# Patient Record
Sex: MALE | Race: BLACK OR AFRICAN AMERICAN | NOT HISPANIC OR LATINO | Employment: OTHER | URBAN - METROPOLITAN AREA
[De-identification: names, ages, dates, MRNs, and addresses within clinical notes are randomized per-mention and may not be internally consistent; named-entity substitution may affect disease eponyms.]

---

## 2022-05-31 ENCOUNTER — LAB VISIT (OUTPATIENT)
Dept: LAB | Facility: HOSPITAL | Age: 57
End: 2022-05-31
Attending: STUDENT IN AN ORGANIZED HEALTH CARE EDUCATION/TRAINING PROGRAM
Payer: MEDICARE

## 2022-05-31 ENCOUNTER — OFFICE VISIT (OUTPATIENT)
Dept: FAMILY MEDICINE | Facility: CLINIC | Age: 57
End: 2022-05-31
Payer: MEDICARE

## 2022-05-31 VITALS
TEMPERATURE: 98 F | BODY MASS INDEX: 40 KG/M2 | SYSTOLIC BLOOD PRESSURE: 134 MMHG | DIASTOLIC BLOOD PRESSURE: 72 MMHG | HEIGHT: 67 IN | WEIGHT: 254.88 LBS | HEART RATE: 84 BPM | OXYGEN SATURATION: 98 %

## 2022-05-31 DIAGNOSIS — Z11.59 ENCOUNTER FOR HEPATITIS C SCREENING TEST FOR LOW RISK PATIENT: ICD-10-CM

## 2022-05-31 DIAGNOSIS — E66.01 CLASS 2 SEVERE OBESITY DUE TO EXCESS CALORIES WITH SERIOUS COMORBIDITY AND BODY MASS INDEX (BMI) OF 39.0 TO 39.9 IN ADULT: ICD-10-CM

## 2022-05-31 DIAGNOSIS — E78.2 HYPERLIPIDEMIA, MIXED: Primary | ICD-10-CM

## 2022-05-31 DIAGNOSIS — I10 ESSENTIAL HYPERTENSION: ICD-10-CM

## 2022-05-31 DIAGNOSIS — E78.2 HYPERLIPIDEMIA, MIXED: ICD-10-CM

## 2022-05-31 DIAGNOSIS — Z11.4 ENCOUNTER FOR SCREENING FOR HUMAN IMMUNODEFICIENCY VIRUS (HIV): ICD-10-CM

## 2022-05-31 LAB
CHOLEST SERPL-MCNC: 169 MG/DL (ref 120–199)
CHOLEST/HDLC SERPL: 3.8 {RATIO} (ref 2–5)
ESTIMATED AVG GLUCOSE: 126 MG/DL (ref 68–131)
HBA1C MFR BLD: 6 % (ref 4–5.6)
HDLC SERPL-MCNC: 44 MG/DL (ref 40–75)
HDLC SERPL: 26 % (ref 20–50)
LDLC SERPL CALC-MCNC: 96 MG/DL (ref 63–159)
NONHDLC SERPL-MCNC: 125 MG/DL
TRIGL SERPL-MCNC: 145 MG/DL (ref 30–150)

## 2022-05-31 PROCEDURE — 99203 OFFICE O/P NEW LOW 30 MIN: CPT | Mod: PBBFAC,PO | Performed by: STUDENT IN AN ORGANIZED HEALTH CARE EDUCATION/TRAINING PROGRAM

## 2022-05-31 PROCEDURE — 80061 LIPID PANEL: CPT | Performed by: STUDENT IN AN ORGANIZED HEALTH CARE EDUCATION/TRAINING PROGRAM

## 2022-05-31 PROCEDURE — 87389 HIV-1 AG W/HIV-1&-2 AB AG IA: CPT | Performed by: STUDENT IN AN ORGANIZED HEALTH CARE EDUCATION/TRAINING PROGRAM

## 2022-05-31 PROCEDURE — 36415 COLL VENOUS BLD VENIPUNCTURE: CPT | Mod: PO | Performed by: STUDENT IN AN ORGANIZED HEALTH CARE EDUCATION/TRAINING PROGRAM

## 2022-05-31 PROCEDURE — 99203 PR OFFICE/OUTPT VISIT, NEW, LEVL III, 30-44 MIN: ICD-10-PCS | Mod: S$PBB,,, | Performed by: STUDENT IN AN ORGANIZED HEALTH CARE EDUCATION/TRAINING PROGRAM

## 2022-05-31 PROCEDURE — 99999 PR PBB SHADOW E&M-NEW PATIENT-LVL III: ICD-10-PCS | Mod: PBBFAC,,, | Performed by: STUDENT IN AN ORGANIZED HEALTH CARE EDUCATION/TRAINING PROGRAM

## 2022-05-31 PROCEDURE — 86803 HEPATITIS C AB TEST: CPT | Performed by: STUDENT IN AN ORGANIZED HEALTH CARE EDUCATION/TRAINING PROGRAM

## 2022-05-31 PROCEDURE — 99999 PR PBB SHADOW E&M-NEW PATIENT-LVL III: CPT | Mod: PBBFAC,,, | Performed by: STUDENT IN AN ORGANIZED HEALTH CARE EDUCATION/TRAINING PROGRAM

## 2022-05-31 PROCEDURE — 83036 HEMOGLOBIN GLYCOSYLATED A1C: CPT | Performed by: STUDENT IN AN ORGANIZED HEALTH CARE EDUCATION/TRAINING PROGRAM

## 2022-05-31 PROCEDURE — 99203 OFFICE O/P NEW LOW 30 MIN: CPT | Mod: S$PBB,,, | Performed by: STUDENT IN AN ORGANIZED HEALTH CARE EDUCATION/TRAINING PROGRAM

## 2022-05-31 RX ORDER — HYDROCHLOROTHIAZIDE 25 MG/1
25 TABLET ORAL DAILY
COMMUNITY
Start: 2022-05-27 | End: 2022-05-31 | Stop reason: SDUPTHER

## 2022-05-31 RX ORDER — LOSARTAN POTASSIUM 50 MG/1
50 TABLET ORAL DAILY
Qty: 90 TABLET | Refills: 1 | Status: SHIPPED | OUTPATIENT
Start: 2022-05-31 | End: 2022-11-30 | Stop reason: SDUPTHER

## 2022-05-31 RX ORDER — OLANZAPINE 10 MG/1
10 TABLET ORAL
COMMUNITY
End: 2022-05-31

## 2022-05-31 RX ORDER — OLANZAPINE 5 MG/1
5 TABLET ORAL
COMMUNITY
End: 2022-05-31 | Stop reason: DRUGHIGH

## 2022-05-31 RX ORDER — OMEPRAZOLE 20 MG/1
20 CAPSULE, DELAYED RELEASE ORAL
COMMUNITY
End: 2023-06-15

## 2022-05-31 RX ORDER — LEUPROLIDE ACETATE 45 MG
KIT INTRAMUSCULAR
Status: ON HOLD | COMMUNITY
Start: 2022-05-04 | End: 2024-04-02 | Stop reason: HOSPADM

## 2022-05-31 RX ORDER — DOCUSATE CALCIUM 240 MG
240 CAPSULE ORAL
COMMUNITY
End: 2022-05-31

## 2022-05-31 RX ORDER — ROSUVASTATIN CALCIUM 10 MG/1
10 TABLET, COATED ORAL
Status: CANCELLED | OUTPATIENT
Start: 2022-05-31

## 2022-05-31 RX ORDER — ROSUVASTATIN CALCIUM 10 MG/1
10 TABLET, COATED ORAL
COMMUNITY
End: 2022-05-31 | Stop reason: SDUPTHER

## 2022-05-31 RX ORDER — ROSUVASTATIN CALCIUM 10 MG/1
10 TABLET, COATED ORAL NIGHTLY
Qty: 90 TABLET | Refills: 3 | Status: SHIPPED | OUTPATIENT
Start: 2022-05-31 | End: 2022-11-30 | Stop reason: SDUPTHER

## 2022-05-31 RX ORDER — OLANZAPINE 15 MG/1
15 TABLET ORAL NIGHTLY
COMMUNITY
Start: 2022-05-27 | End: 2023-06-15

## 2022-05-31 RX ORDER — ALBUTEROL SULFATE 90 UG/1
AEROSOL, METERED RESPIRATORY (INHALATION)
COMMUNITY
End: 2023-06-22 | Stop reason: SDUPTHER

## 2022-05-31 RX ORDER — LOSARTAN POTASSIUM 50 MG/1
50 TABLET ORAL DAILY
COMMUNITY
Start: 2022-05-27 | End: 2022-05-31 | Stop reason: SDUPTHER

## 2022-05-31 RX ORDER — HYDROCHLOROTHIAZIDE 25 MG/1
25 TABLET ORAL
COMMUNITY
End: 2022-05-31 | Stop reason: SDUPTHER

## 2022-05-31 RX ORDER — NAPROXEN 500 MG/1
500 TABLET ORAL
COMMUNITY
End: 2022-05-31

## 2022-05-31 RX ORDER — CETIRIZINE HYDROCHLORIDE 10 MG/1
10 TABLET ORAL
COMMUNITY
End: 2022-05-31

## 2022-05-31 RX ORDER — HYDROCHLOROTHIAZIDE 25 MG/1
25 TABLET ORAL DAILY
Qty: 90 TABLET | Refills: 1 | Status: SHIPPED | OUTPATIENT
Start: 2022-05-31 | End: 2022-11-30 | Stop reason: SDUPTHER

## 2022-05-31 RX ORDER — LOSARTAN POTASSIUM 50 MG/1
50 TABLET ORAL
COMMUNITY
End: 2022-05-31 | Stop reason: SDUPTHER

## 2022-05-31 RX ORDER — LOSARTAN POTASSIUM 50 MG/1
50 TABLET ORAL
Status: CANCELLED | OUTPATIENT
Start: 2022-05-31

## 2022-05-31 RX ORDER — BENAZEPRIL/HYDROCHLOROTHIAZIDE 20 MG-25MG
1 TABLET ORAL DAILY
Status: CANCELLED | OUTPATIENT
Start: 2022-05-31

## 2022-05-31 RX ORDER — BENAZEPRIL/HYDROCHLOROTHIAZIDE 20 MG-25MG
1 TABLET ORAL DAILY
COMMUNITY
End: 2022-05-31 | Stop reason: SDUPTHER

## 2022-05-31 NOTE — PROGRESS NOTES
06/04/2022    Randolph Blue  [id number]    CC: establish care    HPI    This patient is new to me and presents to establish care. Chronic medical conditions listed below.    Patient was recently incarcerated for 7 years, but has been out for the last 3 weeks. Medical conditions for prostate cancer now s/p radiation. Johan is on patient medication q 6 months, but states that he is not on that. Pt also with hypertension that has been well controlled with losartan 50 mg daily. Recently, started on olanzapine for a mood disorder which is managed by psych. He does not have any current concerns or complaints and just wants to establish care.      POSITIVE BOLDED  General: fever, chills, fatigue, weight loss  Eyes: vision changes, blurry vision, eye pain  ENT: hearing loss, ringing, dental problems, sinus congestion, sore throat  Resp: SOB, cough, wheeze  CV: chest pain, palpitations, LE swelling  GI: diarrhea/constipation, black/bloody stools, indigestion  : frequency, urgency, dysuria, polydipsia, hematuria  Skin: rashes, wounds, lacerations  Psych: SI, HI, depressed  Endocrine: heat intolerance, cold intolerance    Past Medical History:   Diagnosis Date    Arthritis     Bronchitis     Cervical arthritis     SILVIA (generalized anxiety disorder) 1/3/2014    GERD (gastroesophageal reflux disease)     Hyperlipidemia, mixed 4/3/2014       Past Surgical History:   Procedure Laterality Date    KNEE SURGERY      bilateral    MANDIBLE FRACTURE SURGERY      SHOULDER SURGERY      [place] [facility].        Family History   Problem Relation Age of Onset    Cancer Mother     Hypertension Mother     Heart disease Father     Hypertension Maternal Uncle     Hypertension Paternal Uncle     Hypertension Maternal Grandmother     Heart disease Paternal Grandmother     Hypertension Paternal Grandmother        Social History     Socioeconomic History    Marital status: Single   Tobacco Use    Smoking status: Never  Smoker    Smokeless tobacco: Never Used   Substance and Sexual Activity    Alcohol use: Yes     Alcohol/week: 3.3 - 4.2 standard drinks     Types: 4 - 5 Standard drinks or equivalent per week     Comment: No alcohol x 2 weeks    Drug use: No         Current Outpatient Medications:     albuterol (PROVENTIL/VENTOLIN HFA) 90 mcg/actuation inhaler, Inhale into the lungs., Disp: , Rfl:     hydroCHLOROthiazide (HYDRODIURIL) 25 MG tablet, Take 1 tablet (25 mg total) by mouth once daily., Disp: 90 tablet, Rfl: 1    leuprolide, 6 month, (ELIGARD) 45 mg injection, Inject 45 mg into the skin., Disp: , Rfl:     losartan (COZAAR) 50 MG tablet, Take 1 tablet (50 mg total) by mouth once daily., Disp: 90 tablet, Rfl: 1    LUPRON DEPOT, 6 MONTH, 45 mg SyKt injection, Inject into the muscle., Disp: , Rfl:     multivitamin capsule, Take 1 capsule by mouth once daily., Disp: , Rfl:     OLANZapine (ZYPREXA) 15 MG Tab, Take 15 mg by mouth nightly., Disp: , Rfl:     omeprazole (PRILOSEC) 20 MG capsule, Take 20 mg by mouth., Disp: , Rfl:     rosuvastatin (CRESTOR) 10 MG tablet, Take 1 tablet (10 mg total) by mouth every evening., Disp: 90 tablet, Rfl: 3        Vitals:    05/31/22 1527   BP: 134/72   Pulse: 84   Temp: 98.4 °F (36.9 °C)       PHYSICAL EXAM    GEN: NAD, AAox3, well nourished  HEENT: NCAT, EOMI, PEERL, no scleral injection, TM normal, moist mucous membranes, oropharynx clear, no erythema, no exudates  NECK: full rom, no cervical lymphadenopathy, no thyroidmegally  CV: RRR, no m/r/g, trace LE edema  LUNGS: CTAB, non-labored breathing, no wheezes, no crackles  ABD: soft, non-distended, no rebound/guarding, no organomegaly  EXT: n c/c/e, warm, 5/5 UE and LE strength  NEURO: CNII-XII intact no focal deficit  PSYCH: nl affect, no hallucinations, nl speech  Skin: inatct, no rashes/lesions/erythema    1. Hyperlipidemia, mixed  - rosuvastatin (CRESTOR) 10 MG tablet; Take 1 tablet (10 mg total) by mouth every evening.   Dispense: 90 tablet; Refill: 3  - Lipid Panel; Future    2. Essential hypertension  Well controlled  Continue current regimen  - rosuvastatin (CRESTOR) 10 MG tablet; Take 1 tablet (10 mg total) by mouth every evening.  Dispense: 90 tablet; Refill: 3  - losartan (COZAAR) 50 MG tablet; Take 1 tablet (50 mg total) by mouth once daily.  Dispense: 90 tablet; Refill: 1  - hydroCHLOROthiazide (HYDRODIURIL) 25 MG tablet; Take 1 tablet (25 mg total) by mouth once daily.  Dispense: 90 tablet; Refill: 1    3. Encounter for hepatitis C screening test for low risk patient  - Hepatitis C Antibody; Future    4. Encounter for screening for human immunodeficiency virus (HIV)  - HIV 1/2 Ag/Ab (4th Gen); Future    5. Class 2 severe obesity due to excess calories with serious comorbidity and body mass index (BMI) of 39.0 to 39.9 in adult  - Hemoglobin A1C; Future  Discussed changing lifestyle including getting at least 30 min. of moderate intensity exercise 3-4 week. Limit processed/packaged foods. Increasing water intake and having a normal sleep schedule of at least 7 hours per night.      RTC if worsening or no improvement in 1-2 weeks    Taina Mullins MD  Family Medicine

## 2022-06-01 ENCOUNTER — TELEPHONE (OUTPATIENT)
Dept: FAMILY MEDICINE | Facility: CLINIC | Age: 57
End: 2022-06-01
Payer: MEDICARE

## 2022-06-01 LAB
HCV AB SERPL QL IA: NEGATIVE
HIV 1+2 AB+HIV1 P24 AG SERPL QL IA: NEGATIVE

## 2022-06-01 NOTE — PROGRESS NOTES
Please let patient know that his diabetes screening shows that he has pre-diabetes which means he needs to change his diet and eat less starches including bread, rice and pasta. Also sugary drinks. We will recheck in 6 months

## 2022-06-01 NOTE — TELEPHONE ENCOUNTER
----- Message from Taina Mullins MD sent at 6/1/2022  7:15 AM CDT -----  Please let patient know that his diabetes screening shows that he has pre-diabetes which means he needs to change his diet and eat less starches including bread, rice and pasta. Also sugary drinks. We will recheck in 6 months

## 2022-06-08 DIAGNOSIS — I10 ESSENTIAL HYPERTENSION: ICD-10-CM

## 2022-11-30 ENCOUNTER — OFFICE VISIT (OUTPATIENT)
Dept: FAMILY MEDICINE | Facility: CLINIC | Age: 57
End: 2022-11-30
Payer: MEDICARE

## 2022-11-30 ENCOUNTER — LAB VISIT (OUTPATIENT)
Dept: LAB | Facility: HOSPITAL | Age: 57
End: 2022-11-30
Attending: STUDENT IN AN ORGANIZED HEALTH CARE EDUCATION/TRAINING PROGRAM
Payer: MEDICARE

## 2022-11-30 VITALS
OXYGEN SATURATION: 97 % | HEIGHT: 67 IN | HEART RATE: 77 BPM | WEIGHT: 255.75 LBS | TEMPERATURE: 98 F | BODY MASS INDEX: 40.14 KG/M2

## 2022-11-30 DIAGNOSIS — E66.01 CLASS 3 SEVERE OBESITY DUE TO EXCESS CALORIES WITH SERIOUS COMORBIDITY AND BODY MASS INDEX (BMI) OF 40.0 TO 44.9 IN ADULT: ICD-10-CM

## 2022-11-30 DIAGNOSIS — R73.03 PREDIABETES: Primary | ICD-10-CM

## 2022-11-30 DIAGNOSIS — E78.2 HYPERLIPIDEMIA, MIXED: ICD-10-CM

## 2022-11-30 DIAGNOSIS — R73.03 PREDIABETES: ICD-10-CM

## 2022-11-30 DIAGNOSIS — I10 ESSENTIAL HYPERTENSION: ICD-10-CM

## 2022-11-30 LAB
ALBUMIN SERPL BCP-MCNC: 3.7 G/DL (ref 3.5–5.2)
ALP SERPL-CCNC: 90 U/L (ref 55–135)
ALT SERPL W/O P-5'-P-CCNC: 17 U/L (ref 10–44)
ANION GAP SERPL CALC-SCNC: 10 MMOL/L (ref 8–16)
AST SERPL-CCNC: 19 U/L (ref 10–40)
BILIRUB SERPL-MCNC: 0.7 MG/DL (ref 0.1–1)
BUN SERPL-MCNC: 18 MG/DL (ref 6–20)
CALCIUM SERPL-MCNC: 10 MG/DL (ref 8.7–10.5)
CHLORIDE SERPL-SCNC: 101 MMOL/L (ref 95–110)
CO2 SERPL-SCNC: 29 MMOL/L (ref 23–29)
CREAT SERPL-MCNC: 0.9 MG/DL (ref 0.5–1.4)
EST. GFR  (NO RACE VARIABLE): >60 ML/MIN/1.73 M^2
ESTIMATED AVG GLUCOSE: 123 MG/DL (ref 68–131)
GLUCOSE SERPL-MCNC: 102 MG/DL (ref 70–110)
HBA1C MFR BLD: 5.9 % (ref 4–5.6)
POTASSIUM SERPL-SCNC: 3.8 MMOL/L (ref 3.5–5.1)
PROT SERPL-MCNC: 8.4 G/DL (ref 6–8.4)
SODIUM SERPL-SCNC: 140 MMOL/L (ref 136–145)

## 2022-11-30 PROCEDURE — 36415 COLL VENOUS BLD VENIPUNCTURE: CPT | Mod: PO | Performed by: STUDENT IN AN ORGANIZED HEALTH CARE EDUCATION/TRAINING PROGRAM

## 2022-11-30 PROCEDURE — 83036 HEMOGLOBIN GLYCOSYLATED A1C: CPT | Performed by: STUDENT IN AN ORGANIZED HEALTH CARE EDUCATION/TRAINING PROGRAM

## 2022-11-30 PROCEDURE — 99999 PR PBB SHADOW E&M-EST. PATIENT-LVL III: CPT | Mod: PBBFAC,,, | Performed by: STUDENT IN AN ORGANIZED HEALTH CARE EDUCATION/TRAINING PROGRAM

## 2022-11-30 PROCEDURE — 99214 PR OFFICE/OUTPT VISIT, EST, LEVL IV, 30-39 MIN: ICD-10-PCS | Mod: S$PBB,,, | Performed by: STUDENT IN AN ORGANIZED HEALTH CARE EDUCATION/TRAINING PROGRAM

## 2022-11-30 PROCEDURE — 80053 COMPREHEN METABOLIC PANEL: CPT | Performed by: STUDENT IN AN ORGANIZED HEALTH CARE EDUCATION/TRAINING PROGRAM

## 2022-11-30 PROCEDURE — 99213 OFFICE O/P EST LOW 20 MIN: CPT | Mod: PBBFAC,PO | Performed by: STUDENT IN AN ORGANIZED HEALTH CARE EDUCATION/TRAINING PROGRAM

## 2022-11-30 PROCEDURE — 99214 OFFICE O/P EST MOD 30 MIN: CPT | Mod: S$PBB,,, | Performed by: STUDENT IN AN ORGANIZED HEALTH CARE EDUCATION/TRAINING PROGRAM

## 2022-11-30 PROCEDURE — 99999 PR PBB SHADOW E&M-EST. PATIENT-LVL III: ICD-10-PCS | Mod: PBBFAC,,, | Performed by: STUDENT IN AN ORGANIZED HEALTH CARE EDUCATION/TRAINING PROGRAM

## 2022-11-30 RX ORDER — ROSUVASTATIN CALCIUM 10 MG/1
10 TABLET, COATED ORAL NIGHTLY
Qty: 90 TABLET | Refills: 3 | Status: SHIPPED | OUTPATIENT
Start: 2022-11-30 | End: 2023-12-04 | Stop reason: SDUPTHER

## 2022-11-30 RX ORDER — LOSARTAN POTASSIUM 50 MG/1
50 TABLET ORAL DAILY
Qty: 90 TABLET | Refills: 3 | Status: SHIPPED | OUTPATIENT
Start: 2022-11-30 | End: 2023-12-04 | Stop reason: SDUPTHER

## 2022-11-30 RX ORDER — FLUOXETINE HYDROCHLORIDE 40 MG/1
40 CAPSULE ORAL DAILY
COMMUNITY
Start: 2022-10-13 | End: 2023-12-04 | Stop reason: SDUPTHER

## 2022-11-30 RX ORDER — HYDROCHLOROTHIAZIDE 25 MG/1
25 TABLET ORAL DAILY
Qty: 90 TABLET | Refills: 3 | Status: SHIPPED | OUTPATIENT
Start: 2022-11-30 | End: 2023-12-04 | Stop reason: SDUPTHER

## 2022-11-30 RX ORDER — AMITRIPTYLINE HYDROCHLORIDE 100 MG/1
TABLET ORAL
COMMUNITY
Start: 2022-10-13 | End: 2022-11-30

## 2022-11-30 NOTE — PROGRESS NOTES
12/01/2022    Randolph Blue  [id number]    CC: 6 month follow up    HPI    Patient presents for routine follow up.    HTN  Monitors BP and remains normotensive  Taking meds w/o issue    Mood disorder: zyprexa  and fluoxetine   Managed with psychiatry; next visit in Feb 2023    Prediabetes  Last hgba1c 6.0  Does not monitor diet. Planned on getting snowball after visit today    Hx of prostate cancer  On lupron inj every 6 months. Having lots of hot flashes     Negative 10 point ROS outside of HPI    Social History     Socioeconomic History    Marital status: Single   Tobacco Use    Smoking status: Never    Smokeless tobacco: Never   Substance and Sexual Activity    Alcohol use: Not Currently     Alcohol/week: 3.3 - 4.2 standard drinks     Types: 4 - 5 Standard drinks or equivalent per week     Comment: No alcohol x 2 weeks    Drug use: No    Sexual activity: Not Currently           Current Outpatient Medications:     albuterol (PROVENTIL/VENTOLIN HFA) 90 mcg/actuation inhaler, Inhale into the lungs., Disp: , Rfl:     FLUoxetine 40 MG capsule, Take 40 mg by mouth once daily., Disp: , Rfl:     leuprolide acetate, 6 month, (ELIGARD) 45 mg injection, Inject 45 mg into the skin every 6 (six) months., Disp: , Rfl:     leuprolide, 6 month, (ELIGARD) 45 mg injection, Inject 45 mg into the skin., Disp: , Rfl:     LUPRON DEPOT, 6 MONTH, 45 mg SyKt injection, Inject into the muscle., Disp: , Rfl:     multivitamin capsule, Take 1 capsule by mouth once daily., Disp: , Rfl:     OLANZapine (ZYPREXA) 15 MG Tab, Take 15 mg by mouth nightly., Disp: , Rfl:     hydroCHLOROthiazide (HYDRODIURIL) 25 MG tablet, Take 1 tablet (25 mg total) by mouth once daily., Disp: 90 tablet, Rfl: 3    losartan (COZAAR) 50 MG tablet, Take 1 tablet (50 mg total) by mouth once daily., Disp: 90 tablet, Rfl: 3    omeprazole (PRILOSEC) 20 MG capsule, Take 20 mg by mouth., Disp: , Rfl:     rosuvastatin (CRESTOR) 10 MG tablet, Take 1 tablet (10 mg total) by mouth  every evening., Disp: 90 tablet, Rfl: 3      Physical Exam  Vitals:    11/30/22 1411   Pulse: 77   Temp: 97.5 °F (36.4 °C)       GEN: NAD, AAox3, well nourished  HEENT: NCAT, EOMI, PEERL, no scleral injection, TM normal, moist mucous membranes, oropharynx clear, no erythema, no exudates  NECK: full rom, no cervical lymphadenopathy, no thyroidmegally  CV: RRR, no m/r/g, trace LE edema  LUNGS: CTAB, non-labored breathing, no wheezes, no crackles  ABD: soft, non-distended, no rebound/guarding, no organomegaly  EXT: n c/c/e, warm, 5/5 UE and LE strength  NEURO: CNII-XII intact no focal deficit  PSYCH: nl affect, no hallucinations, nl speech  Skin: intact, no rashes/lesions/erythema    1. Essential hypertension: well controlled  REFILLED   - hydroCHLOROthiazide (HYDRODIURIL) 25 MG tablet; Take 1 tablet (25 mg total) by mouth once daily.  Dispense: 90 tablet; Refill: 3  - losartan (COZAAR) 50 MG tablet; Take 1 tablet (50 mg total) by mouth once daily.  Dispense: 90 tablet; Refill: 3  - rosuvastatin (CRESTOR) 10 MG tablet; Take 1 tablet (10 mg total) by mouth every evening.  Dispense: 90 tablet; Refill: 3    2. Hyperlipidemia, mixed  - rosuvastatin (CRESTOR) 10 MG tablet; Take 1 tablet (10 mg total) by mouth every evening.  Dispense: 90 tablet; Refill: 3    3. Prediabetes  - Hemoglobin A1C; Future  -discussed lifestyle modifications    4. Class 3 severe obesity due to excess calories with serious comorbidity and body mass index (BMI) of 40.0 to 44.9 in adult  Discussed changing lifestyle including getting at least 30 min. of moderate intensity exercise 3-4 week. Limit processed/packaged foods. Increasing water intake and having a normal sleep schedule of at least 7 hours per night.    RTC in 6 months for routine care    Taina Mullins MD  Family Medicine

## 2022-12-01 ENCOUNTER — TELEPHONE (OUTPATIENT)
Dept: FAMILY MEDICINE | Facility: CLINIC | Age: 57
End: 2022-12-01
Payer: MEDICARE

## 2022-12-01 NOTE — TELEPHONE ENCOUNTER
----- Message from Taina Mullins MD sent at 12/1/2022 12:33 PM CST -----  Please let pt know that his labs were ok. His blood sugar is slightly above average, so he still has prediabetes but it didn't get worse.

## 2022-12-01 NOTE — PROGRESS NOTES
Please let pt know that his labs were ok. His blood sugar is slightly above average, so he still has prediabetes but it didn't get worse.

## 2023-01-04 ENCOUNTER — HOSPITAL ENCOUNTER (EMERGENCY)
Facility: HOSPITAL | Age: 58
Discharge: HOME | End: 2023-01-04
Attending: EMERGENCY MEDICINE
Payer: MEDICARE

## 2023-01-04 VITALS
TEMPERATURE: 99 F | OXYGEN SATURATION: 99 % | DIASTOLIC BLOOD PRESSURE: 70 MMHG | RESPIRATION RATE: 18 BRPM | SYSTOLIC BLOOD PRESSURE: 116 MMHG | HEIGHT: 67 IN | WEIGHT: 255 LBS | HEART RATE: 80 BPM | BODY MASS INDEX: 40.02 KG/M2

## 2023-01-04 DIAGNOSIS — U07.1 COVID-19 VIRUS DETECTED: ICD-10-CM

## 2023-01-04 DIAGNOSIS — U07.1 COVID-19: Primary | ICD-10-CM

## 2023-01-04 LAB — SARS-COV-2 RDRP RESP QL NAA+PROBE: POSITIVE

## 2023-01-04 PROCEDURE — 99283 EMERGENCY DEPT VISIT LOW MDM: CPT | Mod: 25

## 2023-01-04 PROCEDURE — U0002 COVID-19 LAB TEST NON-CDC: HCPCS | Performed by: EMERGENCY MEDICINE

## 2023-01-04 PROCEDURE — 99284 PR EMERGENCY DEPT VISIT,LEVEL IV: ICD-10-PCS | Mod: CR,,, | Performed by: EMERGENCY MEDICINE

## 2023-01-04 PROCEDURE — 99284 EMERGENCY DEPT VISIT MOD MDM: CPT | Mod: CR,,, | Performed by: EMERGENCY MEDICINE

## 2023-01-04 NOTE — ED PROVIDER NOTES
Encounter Date: 1/4/2023    SCRIBE #1 NOTE: I, Zoila James, am scribing for, and in the presence of,  Celi Sandoval MD. I have scribed the entire note.     History     Chief Complaint   Patient presents with    Nasal Congestion     Body aches and headache x2 months, states needs a refill of his asthma inhaler, ran out a few months ago     Randolph Blue is a 57 y.o. male with a past medical history of GERD, HLD, and arthritis who presents with a complaint of nasal congestion onset over a month ago. The patient endorses chills, cough, rhinorrhea, headache, and body aches. This is the first time he was seen for his symptoms. He reports that his symptoms started after his prostate surgery.The patient took Dayquil with no relief. He denies any association with a sick contact. Patient denies fever and leg swelling.       The history is provided by the patient and medical records. No  was used.   Review of patient's allergies indicates:   Allergen Reactions    Percocet [oxycodone-acetaminophen]      Other reaction(s): Rash     Past Medical History:   Diagnosis Date    Arthritis     Bronchitis     Cervical arthritis     SILVIA (generalized anxiety disorder) 1/3/2014    GERD (gastroesophageal reflux disease)     Hyperlipidemia, mixed 4/3/2014     Past Surgical History:   Procedure Laterality Date    KNEE SURGERY      bilateral    MANDIBLE FRACTURE SURGERY      PROSTATE SURGERY      SHOULDER SURGERY      [place] [facility].      Family History   Problem Relation Age of Onset    Cancer Mother     Hypertension Mother     Heart disease Father     Hypertension Maternal Uncle     Hypertension Paternal Uncle     Hypertension Maternal Grandmother     Heart disease Paternal Grandmother     Hypertension Paternal Grandmother      Social History     Tobacco Use    Smoking status: Never    Smokeless tobacco: Never   Substance Use Topics    Alcohol use: Not Currently     Alcohol/week: 3.3 - 4.2 standard drinks      Types: 4 - 5 Standard drinks or equivalent per week     Comment: No alcohol x 2 weeks    Drug use: No     Review of Systems   Constitutional:  Positive for chills. Negative for fever.   HENT:  Positive for rhinorrhea. Negative for congestion and sore throat.    Respiratory:  Positive for cough. Negative for shortness of breath.    Cardiovascular:  Negative for chest pain, palpitations and leg swelling.   Gastrointestinal:  Negative for abdominal distention, abdominal pain, diarrhea, nausea and vomiting.   Genitourinary:  Negative for dysuria and urgency.   Musculoskeletal:  Positive for myalgias. Negative for arthralgias and back pain.   Neurological:  Positive for headaches. Negative for syncope, weakness and light-headedness.   Psychiatric/Behavioral:  Negative for confusion.      Physical Exam     Initial Vitals [01/04/23 1037]   BP Pulse Resp Temp SpO2   139/65 83 (!) 22 98.9 °F (37.2 °C) 96 %      MAP       --         Physical Exam    Nursing note and vitals reviewed.  Constitutional: He appears well-developed and well-nourished. No distress.   HENT:   Mouth/Throat: Oropharynx is clear and moist.   Rhinorrhea. Moist mucous membranes. No erythema or exudates.   Eyes: EOM are normal. Pupils are equal, round, and reactive to light. No scleral icterus.   Neck: Neck supple.   Normal range of motion.  Cardiovascular:  Normal rate and regular rhythm.           No murmur heard.  Pulmonary/Chest: Breath sounds normal. No respiratory distress. He has no wheezes. He has no rales.   Abdominal: Abdomen is soft. He exhibits no distension. There is no abdominal tenderness. There is no rebound and no guarding.   Musculoskeletal:         General: Normal range of motion.      Cervical back: Normal range of motion and neck supple.     Lymphadenopathy:     He has no cervical adenopathy.   Neurological: He is alert and oriented to person, place, and time.   Skin: No rash noted.   Psychiatric: He has a normal mood and affect.        ED Course   Procedures  Labs Reviewed   SARS-COV-2 RNA AMPLIFICATION, QUAL - Abnormal; Notable for the following components:       Result Value    SARS-CoV-2 RNA, Amplification, Qual Positive (*)     All other components within normal limits          Imaging Results              X-Ray Chest PA And Lateral (Final result)  Result time 01/04/23 14:30:59      Final result by Deondre aLntigua MD (01/04/23 14:30:59)                   Impression:      See above      Electronically signed by: Deondre Lantigua MD  Date:    01/04/2023  Time:    14:30               Narrative:    EXAMINATION:  XR CHEST PA AND LATERAL    CLINICAL HISTORY:  chest pain;    TECHNIQUE:  PA and lateral views of the chest were performed.    COMPARISON:  08/12/2015    FINDINGS:  Cardiac size is normal.  Lungs are clear.  No infiltrate or cardiac failure.                                       Medications - No data to display  Medical Decision Making:   History:   Old Medical Records: I decided to obtain old medical records.  Initial Assessment:   Urgent evaluation of 57-year-old male presenting today with nasal congestion, cough.  Vital signs stable patient is well-appearing, no acute respiratory distress.  Differential Diagnosis:   COVID, pneumonia, flu, viral syndrome, URI  Clinical Tests:   Lab Tests: Ordered and Reviewed  Radiological Study: Ordered and Reviewed  ED Management:  - rapid COVID test  - chest x-ray        Scribe Attestation:   Scribe #1: I performed the above scribed service and the documentation accurately describes the services I performed. I attest to the accuracy of the note.      ED Course as of 01/05/23 0845   Wed Jan 04, 2023   1247 SARS-CoV-2 RNA, Amplification, Qual(!): Positive [GM]   1437 Chest x-ray reviewed and interpreted by me with no acute process.  No evidence of pneumonia, pleural effusion noted.    Patient's vitals remained stable with no evidence of hypoxia.    Symptoms are likely from COVID disease, he is  out of the window for treatment with Paxlovid or other antiviral therapy.  His vital signs are stable, I do not think he requires admission at Recommend continue support with Tylenol/ibuprofen as needed for his symptoms.  Return to emergency department if symptoms get worse. [GM]      ED Course User Index  [GM] eCli Sandoval MD                 Clinical Impression:   Final diagnoses:  [U07.1] COVID-19 (Primary)        ED Disposition Condition    Discharge Stable          ED Prescriptions    None       Follow-up Information       Follow up With Specialties Details Why Contact Info    Taina Mullins MD Family Medicine Schedule an appointment as soon as possible for a visit   [address] [facility]  [place] [address]  [phone]               Celi Sandoval MD  01/05/23 9904

## 2023-01-04 NOTE — ED NOTES
Patient identifiers verified and correct for  Mr Camp  C/C:  Congestion SEE NN  APPEARANCE: awake and alert in NAD. PAIN  7/10  SKIN: warm, dry and intact. No breakdown or bruising.  MUSCULOSKELETAL: Patient moving all extremities spontaneously, no obvious swelling or deformities noted. Ambulates independently.  RESPIRATORY: Denies shortness of breath.Respirations unlabored. Positive cough  CARDIAC: Denies CP, 2+ distal pulses; no peripheral edema  ABDOMEN: S/ND/NT, Denies nausea  : voids spontaneously, denies difficulty  Neurologic: AAO x 4; follows commands equal strength in all extremities; denies numbness/tingling. Denies dizziness Denies new weknaess, back pain

## 2023-01-04 NOTE — DISCHARGE INSTRUCTIONS
Continue Tylenol/ibuprofen as needed for body aches.  Over-the-counter cough medication is sufficient.  You do not qualify for any of the antiviral medications because your out of the treatment window.

## 2023-05-30 ENCOUNTER — OFFICE VISIT (OUTPATIENT)
Dept: FAMILY MEDICINE | Facility: CLINIC | Age: 58
End: 2023-05-30
Payer: MEDICARE

## 2023-05-30 VITALS
TEMPERATURE: 98 F | BODY MASS INDEX: 41.09 KG/M2 | HEART RATE: 76 BPM | WEIGHT: 261.81 LBS | HEIGHT: 67 IN | DIASTOLIC BLOOD PRESSURE: 60 MMHG | OXYGEN SATURATION: 96 % | SYSTOLIC BLOOD PRESSURE: 110 MMHG

## 2023-05-30 DIAGNOSIS — I10 ESSENTIAL HYPERTENSION: ICD-10-CM

## 2023-05-30 DIAGNOSIS — J20.8 ACUTE BACTERIAL BRONCHITIS: ICD-10-CM

## 2023-05-30 DIAGNOSIS — Z00.00 ENCOUNTER FOR MEDICAL EXAMINATION TO ESTABLISH CARE: Primary | ICD-10-CM

## 2023-05-30 DIAGNOSIS — E66.01 CLASS 3 SEVERE OBESITY DUE TO EXCESS CALORIES WITH SERIOUS COMORBIDITY AND BODY MASS INDEX (BMI) OF 40.0 TO 44.9 IN ADULT: ICD-10-CM

## 2023-05-30 DIAGNOSIS — R73.03 PREDIABETES: ICD-10-CM

## 2023-05-30 DIAGNOSIS — E78.2 HYPERLIPIDEMIA, MIXED: ICD-10-CM

## 2023-05-30 DIAGNOSIS — B96.89 ACUTE BACTERIAL BRONCHITIS: ICD-10-CM

## 2023-05-30 PROBLEM — E66.813 CLASS 3 SEVERE OBESITY DUE TO EXCESS CALORIES WITH SERIOUS COMORBIDITY AND BODY MASS INDEX (BMI) OF 40.0 TO 44.9 IN ADULT: Status: ACTIVE | Noted: 2023-05-30

## 2023-05-30 PROCEDURE — 1159F MED LIST DOCD IN RCRD: CPT | Mod: CPTII,S$GLB,, | Performed by: FAMILY MEDICINE

## 2023-05-30 PROCEDURE — 99999 PR PBB SHADOW E&M-EST. PATIENT-LVL IV: ICD-10-PCS | Mod: PBBFAC,,, | Performed by: FAMILY MEDICINE

## 2023-05-30 PROCEDURE — 99214 OFFICE O/P EST MOD 30 MIN: CPT | Mod: S$GLB,,, | Performed by: FAMILY MEDICINE

## 2023-05-30 PROCEDURE — 1160F PR REVIEW ALL MEDS BY PRESCRIBER/CLIN PHARMACIST DOCUMENTED: ICD-10-PCS | Mod: CPTII,S$GLB,, | Performed by: FAMILY MEDICINE

## 2023-05-30 PROCEDURE — 1160F RVW MEDS BY RX/DR IN RCRD: CPT | Mod: CPTII,S$GLB,, | Performed by: FAMILY MEDICINE

## 2023-05-30 PROCEDURE — 3008F PR BODY MASS INDEX (BMI) DOCUMENTED: ICD-10-PCS | Mod: CPTII,S$GLB,, | Performed by: FAMILY MEDICINE

## 2023-05-30 PROCEDURE — 4010F ACE/ARB THERAPY RXD/TAKEN: CPT | Mod: CPTII,S$GLB,, | Performed by: FAMILY MEDICINE

## 2023-05-30 PROCEDURE — 3008F BODY MASS INDEX DOCD: CPT | Mod: CPTII,S$GLB,, | Performed by: FAMILY MEDICINE

## 2023-05-30 PROCEDURE — 3074F SYST BP LT 130 MM HG: CPT | Mod: CPTII,S$GLB,, | Performed by: FAMILY MEDICINE

## 2023-05-30 PROCEDURE — 1159F PR MEDICATION LIST DOCUMENTED IN MEDICAL RECORD: ICD-10-PCS | Mod: CPTII,S$GLB,, | Performed by: FAMILY MEDICINE

## 2023-05-30 PROCEDURE — 4010F PR ACE/ARB THEARPY RXD/TAKEN: ICD-10-PCS | Mod: CPTII,S$GLB,, | Performed by: FAMILY MEDICINE

## 2023-05-30 PROCEDURE — 99214 PR OFFICE/OUTPT VISIT, EST, LEVL IV, 30-39 MIN: ICD-10-PCS | Mod: S$GLB,,, | Performed by: FAMILY MEDICINE

## 2023-05-30 PROCEDURE — 3074F PR MOST RECENT SYSTOLIC BLOOD PRESSURE < 130 MM HG: ICD-10-PCS | Mod: CPTII,S$GLB,, | Performed by: FAMILY MEDICINE

## 2023-05-30 PROCEDURE — 3078F PR MOST RECENT DIASTOLIC BLOOD PRESSURE < 80 MM HG: ICD-10-PCS | Mod: CPTII,S$GLB,, | Performed by: FAMILY MEDICINE

## 2023-05-30 PROCEDURE — 99999 PR PBB SHADOW E&M-EST. PATIENT-LVL IV: CPT | Mod: PBBFAC,,, | Performed by: FAMILY MEDICINE

## 2023-05-30 PROCEDURE — 3078F DIAST BP <80 MM HG: CPT | Mod: CPTII,S$GLB,, | Performed by: FAMILY MEDICINE

## 2023-05-30 RX ORDER — SILDENAFIL 100 MG/1
100 TABLET, FILM COATED ORAL
COMMUNITY
Start: 2023-05-08 | End: 2023-06-15

## 2023-05-30 RX ORDER — AMITRIPTYLINE HYDROCHLORIDE 100 MG/1
TABLET ORAL
COMMUNITY
Start: 2022-10-13 | End: 2023-12-04 | Stop reason: SDUPTHER

## 2023-05-30 RX ORDER — BENZONATATE 200 MG/1
200 CAPSULE ORAL 3 TIMES DAILY PRN
Qty: 30 CAPSULE | Refills: 1 | OUTPATIENT
Start: 2023-05-30 | End: 2023-06-15

## 2023-05-30 RX ORDER — LATANOPROST 50 UG/ML
SOLUTION/ DROPS OPHTHALMIC
COMMUNITY
Start: 2023-05-17

## 2023-05-30 RX ORDER — AMOXICILLIN AND CLAVULANATE POTASSIUM 875; 125 MG/1; MG/1
1 TABLET, FILM COATED ORAL EVERY 12 HOURS
Qty: 14 TABLET | Refills: 0 | Status: SHIPPED | OUTPATIENT
Start: 2023-05-30 | End: 2023-06-06

## 2023-05-30 RX ORDER — CHLORHEXIDINE GLUCONATE ORAL RINSE 1.2 MG/ML
SOLUTION DENTAL
COMMUNITY
Start: 2023-05-01

## 2023-05-30 RX ORDER — HYDROCODONE BITARTRATE AND ACETAMINOPHEN 7.5; 325 MG/1; MG/1
TABLET ORAL
COMMUNITY
Start: 2023-05-01 | End: 2023-06-15

## 2023-05-30 RX ORDER — METHOCARBAMOL 500 MG/1
TABLET, FILM COATED ORAL
COMMUNITY
Start: 2023-05-01 | End: 2023-06-15

## 2023-05-30 RX ORDER — PROMETHAZINE HYDROCHLORIDE AND DEXTROMETHORPHAN HYDROBROMIDE 6.25; 15 MG/5ML; MG/5ML
SYRUP ORAL
Qty: 240 ML | Refills: 0 | OUTPATIENT
Start: 2023-05-30 | End: 2023-06-15

## 2023-05-30 RX ORDER — MEDICAL SUPPLY, MISCELLANEOUS
MISCELLANEOUS MISCELLANEOUS
Status: ON HOLD | COMMUNITY
Start: 2023-01-09 | End: 2024-04-02 | Stop reason: HOSPADM

## 2023-05-30 RX ORDER — AMOXICILLIN 500 MG/1
CAPSULE ORAL
COMMUNITY
Start: 2023-05-01 | End: 2023-05-30

## 2023-05-30 RX ORDER — MELOXICAM 15 MG/1
TABLET ORAL
COMMUNITY
Start: 2023-05-09 | End: 2023-06-15

## 2023-05-30 NOTE — PROGRESS NOTES
Assessment & Plan:    Encounter for medical examination to establish care    Acute bacterial bronchitis  -     amoxicillin-clavulanate 875-125mg (AUGMENTIN) 875-125 mg per tablet; Take 1 tablet by mouth every 12 (twelve) hours. for 7 days  Dispense: 14 tablet; Refill: 0  -     promethazine-dextromethorphan (PROMETHAZINE-DM) 6.25-15 mg/5 mL Syrp; Take 10-15ml by mouth every 8 hours as needed for coughing  Dispense: 240 mL; Refill: 0  -     benzonatate (TESSALON) 200 MG capsule; Take 1 capsule (200 mg total) by mouth 3 (three) times daily as needed for Cough.  Dispense: 30 capsule; Refill: 1    Start antibiotic. Antitussives prn.    Class 3 severe obesity due to excess calories with serious comorbidity and body mass index (BMI) of 40.0 to 44.9 in adult  -     Hemoglobin A1C; Future; Expected date: 05/30/2023  -     Lipid Panel; Future; Expected date: 05/30/2023    Essential hypertension  -     CBC Auto Differential; Future; Expected date: 05/30/2023  -     Comprehensive Metabolic Panel; Future; Expected date: 05/30/2023  -     Hemoglobin A1C; Future; Expected date: 05/30/2023  -     Lipid Panel; Future; Expected date: 05/30/2023    Controlled. Continue current therapy.     Hyperlipidemia, mixed  -     Lipid Panel; Future; Expected date: 05/30/2023    Prediabetes  -     Hemoglobin A1C; Future; Expected date: 05/30/2023    Due for repeat labs.       Declines shingles vaccine.     Follow-up: Follow up in about 6 months (around 11/30/2023).  ______________________________________________________________________    Chief Complaint  Chief Complaint   Patient presents with    Establish Care       HPI  Randolph Blue is a 57 y.o. male with medical diagnoses as listed in the medical history and problem list that presents to the office to establish care. He c/o cough and rhinorrhea that began 2 months ago, despite taking Mucinex and Nyquil. He has COVID in January but he recovered from this before the new symptoms began.      Health Maintenance         Date Due Completion Date    COVID-19 Vaccine (1) Never done ---    Shingles Vaccine (1 of 2) Never done ---    TETANUS VACCINE 06/01/2032 (Originally 6/23/1983) ---    Influenza Vaccine (Season Ended) 09/01/2023 ---    Hemoglobin A1c (Prediabetes) 11/30/2023 11/30/2022    Colorectal Cancer Screening 04/10/2025 4/10/2015    Lipid Panel 05/31/2027 5/31/2022              PAST MEDICAL HISTORY:  Past Medical History:   Diagnosis Date    Arthritis     Bronchitis     Cervical arthritis     SILVIA (generalized anxiety disorder) 01/03/2014    GERD (gastroesophageal reflux disease)     Glaucoma     Hyperlipidemia, mixed 04/03/2014       PAST SURGICAL HISTORY:  Past Surgical History:   Procedure Laterality Date    KNEE SURGERY      bilateral    MANDIBLE FRACTURE SURGERY      PROSTATE SURGERY      SHOULDER SURGERY      [place] at [facility].        SOCIAL HISTORY:  Social History     Socioeconomic History    Marital status: Single   Tobacco Use    Smoking status: Never    Smokeless tobacco: Never   Substance and Sexual Activity    Alcohol use: Not Currently     Alcohol/week: 3.3 - 4.2 standard drinks     Types: 4 - 5 Standard drinks or equivalent per week     Comment: No alcohol x 2 weeks    Drug use: No    Sexual activity: Not Currently       FAMILY HISTORY:  Family History   Problem Relation Age of Onset    Cancer Mother     Hypertension Mother     Heart disease Father     Hypertension Maternal Uncle     Hypertension Paternal Uncle     Hypertension Maternal Grandmother     Heart disease Paternal Grandmother     Hypertension Paternal Grandmother        ALLERGIES AND MEDICATIONS: updated and reviewed.  Review of patient's allergies indicates:   Allergen Reactions    Percocet [oxycodone-acetaminophen]      Other reaction(s): Rash     Current Outpatient Medications   Medication Sig Dispense Refill    amitriptyline (ELAVIL) 100 MG tablet Take by mouth.      FLUoxetine 40 MG capsule Take 40 mg by  mouth once daily.      hydroCHLOROthiazide (HYDRODIURIL) 25 MG tablet Take 1 tablet (25 mg total) by mouth once daily. 90 tablet 3    losartan (COZAAR) 50 MG tablet Take 1 tablet (50 mg total) by mouth once daily. 90 tablet 3    mv-min/folic/K1/lycopen/lutein (CENTRUM MEN 50 PLUS MINIS ORAL) Take by mouth.      rosuvastatin (CRESTOR) 10 MG tablet Take 1 tablet (10 mg total) by mouth every evening. 90 tablet 3    albuterol (PROVENTIL/VENTOLIN HFA) 90 mcg/actuation inhaler Inhale into the lungs.      amoxicillin-clavulanate 875-125mg (AUGMENTIN) 875-125 mg per tablet Take 1 tablet by mouth every 12 (twelve) hours. for 7 days 14 tablet 0    benzonatate (TESSALON) 200 MG capsule Take 1 capsule (200 mg total) by mouth 3 (three) times daily as needed for Cough. 30 capsule 1    chlorhexidine (PERIDEX) 0.12 % solution       HYDROcodone-acetaminophen (NORCO) 7.5-325 mg per tablet       latanoprost 0.005 % ophthalmic solution       leuprolide acetate, 6 month, (ELIGARD) 45 mg injection Inject 45 mg into the skin every 6 (six) months.      leuprolide, 6 month, (ELIGARD) 45 mg injection Inject 45 mg into the skin.      LUPRON DEPOT, 6 MONTH, 45 mg SyKt injection Inject into the muscle.      meloxicam (MOBIC) 15 MG tablet Take by mouth.      methocarbamoL (ROBAXIN) 500 MG Tab       miscellaneous medical supply (C-TUB) Misc vacuum erection device      multivitamin capsule Take 1 capsule by mouth once daily.      OLANZapine (ZYPREXA) 15 MG Tab Take 15 mg by mouth nightly.      omeprazole (PRILOSEC) 20 MG capsule Take 20 mg by mouth.      promethazine-dextromethorphan (PROMETHAZINE-DM) 6.25-15 mg/5 mL Syrp Take 10-15ml by mouth every 8 hours as needed for coughing 240 mL 0    sildenafiL (VIAGRA) 100 MG tablet Take 100 mg by mouth.      vacuum erection device system ([facility]) Kit Place the tube over the entire penis. Use the pump to pull the air out of the tube as directed (Patient not taking: Reported on 5/30/2023) 1  "kit 1     No current facility-administered medications for this visit.         ROS  Review of Systems   Constitutional:  Negative for activity change, appetite change, chills and fever.   HENT:  Positive for rhinorrhea. Negative for congestion, ear pain, postnasal drip, sinus pressure, sinus pain, sneezing, sore throat and voice change.    Eyes:  Negative for discharge, redness and itching.   Respiratory:  Positive for cough. Negative for shortness of breath and wheezing.    Musculoskeletal:  Negative for myalgias.   Skin:  Negative for color change and rash.   Neurological:  Negative for dizziness and headaches.   Hematological:  Negative for adenopathy.         Physical Exam  Vitals:    05/30/23 1417   BP: 110/60   BP Location: Right arm   Patient Position: Sitting   BP Method: Large (Manual)   Pulse: 76   Temp: 97.9 °F (36.6 °C)   TempSrc: Oral   SpO2: 96%   Weight: 118.8 kg (261 lb 12.7 oz)   Height: 5' 7" (1.702 m)    Body mass index is 41 kg/m².  Weight: 118.8 kg (261 lb 12.7 oz)   Height: 5' 7" (170.2 cm)   Physical Exam  Constitutional:       General: He is not in acute distress.     Appearance: He is obese.   HENT:      Head: Normocephalic and atraumatic.   Neck:      Thyroid: No thyromegaly.   Cardiovascular:      Rate and Rhythm: Normal rate and regular rhythm.      Pulses: Normal pulses.      Heart sounds: Normal heart sounds.   Pulmonary:      Effort: Pulmonary effort is normal. No respiratory distress.      Breath sounds: Normal breath sounds.   Musculoskeletal:      Cervical back: Neck supple.   Lymphadenopathy:      Cervical: No cervical adenopathy.   Skin:     General: Skin is warm and dry.      Findings: No rash.   Neurological:      General: No focal deficit present.      Mental Status: He is alert and oriented to person, place, and time.   Psychiatric:         Mood and Affect: Mood normal.         Behavior: Behavior normal.         Thought Content: Thought content normal.           "

## 2023-05-31 ENCOUNTER — LAB VISIT (OUTPATIENT)
Dept: LAB | Facility: HOSPITAL | Age: 58
End: 2023-05-31
Attending: FAMILY MEDICINE
Payer: MEDICARE

## 2023-05-31 DIAGNOSIS — E78.2 HYPERLIPIDEMIA, MIXED: ICD-10-CM

## 2023-05-31 DIAGNOSIS — E66.01 CLASS 3 SEVERE OBESITY DUE TO EXCESS CALORIES WITH SERIOUS COMORBIDITY AND BODY MASS INDEX (BMI) OF 40.0 TO 44.9 IN ADULT: ICD-10-CM

## 2023-05-31 DIAGNOSIS — R73.03 PREDIABETES: ICD-10-CM

## 2023-05-31 DIAGNOSIS — I10 ESSENTIAL HYPERTENSION: ICD-10-CM

## 2023-05-31 LAB
ALBUMIN SERPL BCP-MCNC: 3.5 G/DL (ref 3.5–5.2)
ALP SERPL-CCNC: 97 U/L (ref 55–135)
ALT SERPL W/O P-5'-P-CCNC: 17 U/L (ref 10–44)
ANION GAP SERPL CALC-SCNC: 11 MMOL/L (ref 8–16)
AST SERPL-CCNC: 19 U/L (ref 10–40)
BASOPHILS # BLD AUTO: 0.03 K/UL (ref 0–0.2)
BASOPHILS NFR BLD: 0.6 % (ref 0–1.9)
BILIRUB SERPL-MCNC: 0.7 MG/DL (ref 0.1–1)
BUN SERPL-MCNC: 14 MG/DL (ref 6–20)
CALCIUM SERPL-MCNC: 9.8 MG/DL (ref 8.7–10.5)
CHLORIDE SERPL-SCNC: 101 MMOL/L (ref 95–110)
CHOLEST SERPL-MCNC: 156 MG/DL (ref 120–199)
CHOLEST/HDLC SERPL: 4.1 {RATIO} (ref 2–5)
CO2 SERPL-SCNC: 27 MMOL/L (ref 23–29)
CREAT SERPL-MCNC: 0.8 MG/DL (ref 0.5–1.4)
DIFFERENTIAL METHOD: ABNORMAL
EOSINOPHIL # BLD AUTO: 0.1 K/UL (ref 0–0.5)
EOSINOPHIL NFR BLD: 1.9 % (ref 0–8)
ERYTHROCYTE [DISTWIDTH] IN BLOOD BY AUTOMATED COUNT: 13.5 % (ref 11.5–14.5)
EST. GFR  (NO RACE VARIABLE): >60 ML/MIN/1.73 M^2
ESTIMATED AVG GLUCOSE: 120 MG/DL (ref 68–131)
GLUCOSE SERPL-MCNC: 102 MG/DL (ref 70–110)
HBA1C MFR BLD: 5.8 % (ref 4–5.6)
HCT VFR BLD AUTO: 40.6 % (ref 40–54)
HDLC SERPL-MCNC: 38 MG/DL (ref 40–75)
HDLC SERPL: 24.4 % (ref 20–50)
HGB BLD-MCNC: 13 G/DL (ref 14–18)
IMM GRANULOCYTES # BLD AUTO: 0.02 K/UL (ref 0–0.04)
IMM GRANULOCYTES NFR BLD AUTO: 0.4 % (ref 0–0.5)
LDLC SERPL CALC-MCNC: 90.2 MG/DL (ref 63–159)
LYMPHOCYTES # BLD AUTO: 1.1 K/UL (ref 1–4.8)
LYMPHOCYTES NFR BLD: 21.7 % (ref 18–48)
MCH RBC QN AUTO: 26.8 PG (ref 27–31)
MCHC RBC AUTO-ENTMCNC: 32 G/DL (ref 32–36)
MCV RBC AUTO: 84 FL (ref 82–98)
MONOCYTES # BLD AUTO: 0.5 K/UL (ref 0.3–1)
MONOCYTES NFR BLD: 9.6 % (ref 4–15)
NEUTROPHILS # BLD AUTO: 3.4 K/UL (ref 1.8–7.7)
NEUTROPHILS NFR BLD: 65.8 % (ref 38–73)
NONHDLC SERPL-MCNC: 118 MG/DL
NRBC BLD-RTO: 0 /100 WBC
PLATELET # BLD AUTO: 309 K/UL (ref 150–450)
PMV BLD AUTO: 10.9 FL (ref 9.2–12.9)
POTASSIUM SERPL-SCNC: 3.1 MMOL/L (ref 3.5–5.1)
PROT SERPL-MCNC: 7.9 G/DL (ref 6–8.4)
RBC # BLD AUTO: 4.85 M/UL (ref 4.6–6.2)
SODIUM SERPL-SCNC: 139 MMOL/L (ref 136–145)
TRIGL SERPL-MCNC: 139 MG/DL (ref 30–150)
WBC # BLD AUTO: 5.2 K/UL (ref 3.9–12.7)

## 2023-05-31 PROCEDURE — 85025 COMPLETE CBC W/AUTO DIFF WBC: CPT | Performed by: FAMILY MEDICINE

## 2023-05-31 PROCEDURE — 80053 COMPREHEN METABOLIC PANEL: CPT | Performed by: FAMILY MEDICINE

## 2023-05-31 PROCEDURE — 36415 COLL VENOUS BLD VENIPUNCTURE: CPT | Mod: PO | Performed by: FAMILY MEDICINE

## 2023-05-31 PROCEDURE — 80061 LIPID PANEL: CPT | Performed by: FAMILY MEDICINE

## 2023-05-31 PROCEDURE — 83036 HEMOGLOBIN GLYCOSYLATED A1C: CPT | Performed by: FAMILY MEDICINE

## 2023-06-01 ENCOUNTER — PATIENT MESSAGE (OUTPATIENT)
Dept: FAMILY MEDICINE | Facility: CLINIC | Age: 58
End: 2023-06-01
Payer: MEDICARE

## 2023-06-01 ENCOUNTER — TELEPHONE (OUTPATIENT)
Dept: FAMILY MEDICINE | Facility: CLINIC | Age: 58
End: 2023-06-01
Payer: MEDICARE

## 2023-06-01 DIAGNOSIS — D64.9 ANEMIA, UNSPECIFIED TYPE: ICD-10-CM

## 2023-06-01 DIAGNOSIS — T50.2X5A DIURETIC-INDUCED HYPOKALEMIA: Primary | ICD-10-CM

## 2023-06-01 DIAGNOSIS — E87.6 DIURETIC-INDUCED HYPOKALEMIA: Primary | ICD-10-CM

## 2023-06-01 DIAGNOSIS — D53.9 ANEMIA, DEFICIENCY: ICD-10-CM

## 2023-06-01 RX ORDER — POTASSIUM CHLORIDE 20 MEQ/1
20 TABLET, EXTENDED RELEASE ORAL DAILY
Qty: 30 TABLET | Refills: 11 | OUTPATIENT
Start: 2023-06-01 | End: 2023-06-15

## 2023-06-01 NOTE — TELEPHONE ENCOUNTER
----- Message from Keo Haji sent at 6/1/2023  3:31 PM CDT -----  Regarding: Randolph  Type: Patient Callback     Who called: Randolph     What is the request in detail: Patient wants to get the results of his labs     Can the clinic reply by [facility]? Yes     Would the patient rather a call back or a response via [facility]? Callback     Best call back number: [phone]      Additional Information:

## 2023-06-02 NOTE — TELEPHONE ENCOUNTER
Attempted to return call to pt, no answer, left VM instructing pt to return call to clinic or check his MyChart messages as a message was sent to him from PCP.

## 2023-06-15 ENCOUNTER — HOSPITAL ENCOUNTER (EMERGENCY)
Facility: HOSPITAL | Age: 58
Discharge: HOME | End: 2023-06-15
Attending: EMERGENCY MEDICINE
Payer: MEDICARE

## 2023-06-15 VITALS
OXYGEN SATURATION: 98 % | RESPIRATION RATE: 20 BRPM | DIASTOLIC BLOOD PRESSURE: 75 MMHG | HEIGHT: 67 IN | HEART RATE: 81 BPM | SYSTOLIC BLOOD PRESSURE: 128 MMHG | BODY MASS INDEX: 40.02 KG/M2 | TEMPERATURE: 99 F | WEIGHT: 255 LBS

## 2023-06-15 DIAGNOSIS — R07.9 CHEST PAIN: ICD-10-CM

## 2023-06-15 DIAGNOSIS — R07.89 CHEST WALL PAIN: Primary | ICD-10-CM

## 2023-06-15 LAB
ALBUMIN SERPL-MCNC: 3.2 G/DL (ref 3.3–5.5)
ALP SERPL-CCNC: 96 U/L (ref 42–141)
BILIRUB SERPL-MCNC: 1 MG/DL (ref 0.2–1.6)
BILIRUBIN, POC UA: NEGATIVE
BLOOD, POC UA: NEGATIVE
BUN SERPL-MCNC: 11 MG/DL (ref 7–22)
CALCIUM SERPL-MCNC: 10.1 MG/DL (ref 8–10.3)
CHLORIDE SERPL-SCNC: 99 MMOL/L (ref 98–108)
CLARITY, POC UA: CLEAR
COLOR, POC UA: YELLOW
CREAT SERPL-MCNC: 0.8 MG/DL (ref 0.6–1.2)
GLUCOSE SERPL-MCNC: 104 MG/DL (ref 73–118)
GLUCOSE, POC UA: NEGATIVE
KETONES, POC UA: NEGATIVE
LEUKOCYTE EST, POC UA: NEGATIVE
NITRITE, POC UA: NEGATIVE
PH UR STRIP: 6 [PH]
POC ALT (SGPT): 21 U/L (ref 10–47)
POC AST (SGOT): 28 U/L (ref 11–38)
POC B-TYPE NATRIURETIC PEPTIDE: <5 PG/ML (ref 0–100)
POC CARDIAC TROPONIN I: 0 NG/ML (ref 0–0.08)
POC D-DI: <100 NG/ML (ref 0–450)
POC PTINR: 1.5 (ref 0.9–1.2)
POC PTWBT: 17.4 SEC (ref 9.7–14.3)
POC TCO2: 27 MMOL/L (ref 18–33)
POTASSIUM BLD-SCNC: 3.7 MMOL/L (ref 3.6–5.1)
PROTEIN, POC UA: NEGATIVE
PROTEIN, POC: 7.7 G/DL (ref 6.4–8.1)
SAMPLE: ABNORMAL
SAMPLE: NORMAL
SODIUM BLD-SCNC: 138 MMOL/L (ref 128–145)
SPECIFIC GRAVITY, POC UA: 1.02
UROBILINOGEN, POC UA: 0.2 E.U./DL

## 2023-06-15 PROCEDURE — 85379 FIBRIN DEGRADATION QUANT: CPT | Mod: ER

## 2023-06-15 PROCEDURE — 81003 URINALYSIS AUTO W/O SCOPE: CPT | Mod: ER

## 2023-06-15 PROCEDURE — 83880 ASSAY OF NATRIURETIC PEPTIDE: CPT | Mod: ER

## 2023-06-15 PROCEDURE — 85025 COMPLETE CBC W/AUTO DIFF WBC: CPT | Mod: ER

## 2023-06-15 PROCEDURE — 99284 EMERGENCY DEPT VISIT MOD MDM: CPT | Mod: 25,ER

## 2023-06-15 PROCEDURE — 80053 COMPREHEN METABOLIC PANEL: CPT | Mod: ER

## 2023-06-15 PROCEDURE — 84484 ASSAY OF TROPONIN QUANT: CPT | Mod: ER

## 2023-06-15 PROCEDURE — 85610 PROTHROMBIN TIME: CPT | Mod: ER

## 2023-06-15 RX ORDER — DEXTROMETHORPHAN HYDROBROMIDE, GUAIFENESIN 5; 100 MG/5ML; MG/5ML
650 LIQUID ORAL EVERY 8 HOURS
Qty: 20 TABLET | Refills: 0 | Status: SHIPPED | OUTPATIENT
Start: 2023-06-15 | End: 2023-06-22

## 2023-06-15 RX ORDER — IBUPROFEN 600 MG/1
600 TABLET ORAL EVERY 6 HOURS PRN
Qty: 20 TABLET | Refills: 0 | Status: SHIPPED | OUTPATIENT
Start: 2023-06-15 | End: 2023-06-20

## 2023-06-15 RX ORDER — METHOCARBAMOL 500 MG/1
1000 TABLET, FILM COATED ORAL 3 TIMES DAILY
Qty: 30 TABLET | Refills: 0 | Status: SHIPPED | OUTPATIENT
Start: 2023-06-15 | End: 2023-06-20

## 2023-06-15 NOTE — ED PROVIDER NOTES
Encounter Date: 6/15/2023       History     Chief Complaint   Patient presents with    Chest Pain     Pt states chest right side chest pain x2 days non radiating denies N&V denies injury      57 y.o. male with a PMH of GERD, Bronchitis, Hyperlipidemia who presents to the Emergency Department with complaints of right sided CP that started 3 days ago.  He denies any associated symptoms.  He denies rash, fever, SOB, numbness, weakness, tingling, abdominal pain, back pain, dysuria, hematuria, nausea, vomiting, diarrhea, or any other complaints.   He rates the pain as 8/10 and has taken 162 mg ASA PTA with no relief.  He states he has had this pain in the past and it was a muscle.  No Alleviating/aggravating factors.                The history is provided by the patient.   Review of patient's allergies indicates:   Allergen Reactions    Percocet [oxycodone-acetaminophen]      Other reaction(s): Rash     Past Medical History:   Diagnosis Date    Arthritis     Bronchitis     Cervical arthritis     SILVIA (generalized anxiety disorder) 01/03/2014    GERD (gastroesophageal reflux disease)     Glaucoma     Hyperlipidemia, mixed 04/03/2014     Past Surgical History:   Procedure Laterality Date    KNEE SURGERY      bilateral    MANDIBLE FRACTURE SURGERY      PROSTATE SURGERY      SHOULDER SURGERY      [place] at [facility].      Family History   Problem Relation Age of Onset    Cancer Mother     Hypertension Mother     Heart disease Father     Hypertension Maternal Uncle     Hypertension Paternal Uncle     Hypertension Maternal Grandmother     Heart disease Paternal Grandmother     Hypertension Paternal Grandmother      Social History     Tobacco Use    Smoking status: Never    Smokeless tobacco: Never   Substance Use Topics    Alcohol use: Not Currently     Alcohol/week: 3.3 - 4.2 standard drinks     Types: 4 - 5 Standard drinks or equivalent per week     Comment: No alcohol x 2 weeks    Drug use: No     Review of Systems    Constitutional:  Negative for chills and fever.   HENT:  Negative for congestion, ear pain, rhinorrhea and sore throat.    Eyes:  Negative for pain, discharge and redness.   Respiratory:  Negative for cough and shortness of breath.    Cardiovascular:  Positive for chest pain.   Gastrointestinal:  Negative for abdominal pain, diarrhea, nausea and vomiting.   Genitourinary:  Negative for dysuria.   Musculoskeletal:  Negative for back pain, neck pain and neck stiffness.   Skin:  Negative for rash.   Neurological:  Negative for dizziness, weakness, light-headedness, numbness and headaches.   Psychiatric/Behavioral:  Negative for confusion.      Physical Exam     Initial Vitals [06/15/23 0952]   BP Pulse Resp Temp SpO2   128/75 81 20 98.1 °F (36.7 °C) 98 %      MAP       --         Physical Exam    Nursing note and vitals reviewed.  Constitutional: Vital signs are normal. He appears well-developed. He is cooperative.  Non-toxic appearance. He does not appear ill.   HENT:   Head: Normocephalic and atraumatic.   Right Ear: External ear normal.   Left Ear: External ear normal.   Eyes: Conjunctivae are normal.   Neck:   Normal range of motion.  Cardiovascular:  Normal rate and regular rhythm.           Pulmonary/Chest: Effort normal and breath sounds normal.   Abdominal: Abdomen is soft. There is no abdominal tenderness.   Musculoskeletal:      Cervical back: Normal range of motion.     Neurological: He is alert and oriented to person, place, and time. GCS eye subscore is 4. GCS verbal subscore is 5. GCS motor subscore is 6.   Skin: Skin is warm, dry and intact. No rash noted.   Psychiatric: He has a normal mood and affect. His speech is normal and behavior is normal. Thought content normal.       ED Course   Procedures  Labs Reviewed   ISTAT PROCEDURE - Abnormal; Notable for the following components:       Result Value    POC PTWBT 17.4 (*)     POC PTINR 1.5 (*)     All other components within normal limits   TROPONIN  ISTAT   POCT CBC   POCT URINALYSIS W/O SCOPE   POCT URINALYSIS W/O SCOPE   POCT CMP   POCT PROTIME-INR   POCT TROPONIN   POCT B-TYPE NATRIURETIC PEPTIDE (BNP)   POCT D DIMER   POCT CMP   POCT D DIMER   POCT B-TYPE NATRIURETIC PEPTIDE (BNP)           EKG Readings: (Independently Interpreted)   Initial Reading: No STEMI. Previous EKG: Compared with most recent EKG Previous EKG Date: 8/12/2015. Rhythm: Normal Sinus Rhythm. Heart Rate: 77. Axis: Normal. Clinical Impression: Normal Sinus Rhythm   QTc 436, normal EKG     Imaging Results              X-Ray Chest PA And Lateral (Final result)  Result time 06/15/23 10:34:46      Final result by Zaire Cisneros III, MD (06/15/23 10:34:46)                   Impression:      No acute process seen.      Electronically signed by: Zaire Cisneros MD  Date:    06/15/2023  Time:    10:34               Narrative:    EXAMINATION:  XR CHEST PA AND LATERAL    CLINICAL HISTORY:  Chest Pain;    FINDINGS:  Heart size is normal.  Lungs are clear and the bones are noncontributory.                                       Medications - No data to display     Additional MDM:   PERC Rule:   Age is greater than or equal to 50 = 1.0  Heart Rate is greater than or equal to 100 = 0.0  SaO2 on room air < 95% = 0.0  Unilateral leg swelling = 0.0  Hemoptysis = 0.0  Recent surgery or trauma = 0.0  Prior PE or DVT =  0.0  Hormone use = 0.00  PERC Score = 1  Heart Score:    History:          Slightly suspicious.  ECG:             Normal  Age:               45-65 years  Risk factors: 1-2 risk factors  Troponin:       Less than or equal to normal limit  Final Score: 2    APC / Resident Notes:   This is an evaluation of a 57 y.o. male that presents to the Emergency Department for right-sided CP. Physical Exam shows a non-toxic, afebrile, and well appearing male. Breath sounds clear and equal bilaterally, no increased work of breathing or respiratory distress. Heart sounds regular rate and rhythm. No murmurs.  Abdomen soft and non tender. No palpable masses or bruits. Equal upper and lower extremity pulses, no ripping chest pain to the back. No dysphagia or vomiting and CXR negative for mediastinal air.  No evidence of hypoxia.     Vital Signs: 128/75, 98.1, 81,20, 98%   If available, previous records reviewed.   I ordered labs and personally reviewed them.  Labs significant for BNP < 5.0, D-Dimer <100, UA negative, INR 1.5, Troponin 0.00, BUN 11, Creatinine 0.8, WBC 5.6, H&H 12&37, plt 284  I ordered X-rays and reviewed the radiologist interpretation.  Xray significant for No acute process seen    EKG interpreted Dr. Marvin, with No STEMI; Heart Score 2    My overall impression is right-sided CP.  I considered but doubt pulmonary embolus, acute myocardial infarction, NSTEMI, Boerhaeve syndrome, cardiac tamponade, thoracic artery dissection, acute coronary syndrome, pneumothorax, pneumonia, CHF, cardiac arrhythmia, or any other emergent cardiac, esophageal, pulmonary or aortic pathology.    The patient will be discharged home with Robaxin. Additional home care recommendations include Tylenol/Ibuprofen, Hydration. The diagnosis, treatment plan, instructions for follow-up, strict return precautions, and reevaluation with his Cardiology-referral placed as well as ED return precautions have been discussed with the patient and he has verbalized an understanding of the information.  All questions or concerns from the patient have been addressed.         ED Course as of 06/15/23 1148   Thu Valente 15, 2023   1057 EKG independently interpreted by Elin Marvin DO reads: No STEMI. Rate of 77. Normal Sinus Rhythm. Normal Axis. Normal EKG. QTc normal at 436. When compared to prior EKG dated 8/12/15 rate decreased by 35 bpm.   External documents reviewed for previous EKG comparison. [KATIE]      ED Course User Index  [KTAIE] Amanda Garg                 Clinical Impression:   Final diagnoses:  [R07.9] Chest pain  [R07.89] Chest wall pain -  right-sided (Primary)        ED Disposition Condition    Discharge Stable          ED Prescriptions       Medication Sig Dispense Start Date End Date Auth. Provider    acetaminophen (TYLENOL) 650 MG TbSR Take 1 tablet (650 mg total) by mouth every 8 (eight) hours. for 7 days 20 tablet 6/15/2023 6/22/2023 GARRETT Anderson    ibuprofen (ADVIL,MOTRIN) 600 MG tablet Take 1 tablet (600 mg total) by mouth every 6 (six) hours as needed for Pain. 20 tablet 6/15/2023 6/20/2023 GARRETT Anderson    methocarbamoL (ROBAXIN) 500 MG Tab Take 2 tablets (1,000 mg total) by mouth 3 (three) times daily. for 5 days 30 tablet 6/15/2023 6/20/2023 GARRETT Anderson          Follow-up Information       Follow up With Specialties Details Why Contact Info Additional Information    [facility] Schedule an appointment as soon as possible for a visit in 2 days  [address] [facility] [address]  [facility] [address] [phone] Please park in garage or Medical Office Bldg. surface lot and use Medical [facility] Bldg elevator. Check in at [facility] [address].    [facility] ED Emergency Medicine Go to  If symptoms worsen [address] [facility] [address] [phone]              GARRETT Anderson  06/15/23 4762

## 2023-06-15 NOTE — DISCHARGE INSTRUCTIONS
§ Please return to the Emergency Department for any new or worsening symptoms including: fever, chest pain, shortness of breath, loss of consciousness, dizziness, weakness, or any other concerns.     § Schedule an appointment for follow up with your Primary Care Doctor and Cardiology as soon as possible for a recheck of your symptoms. If you do not have one, contact the one listed on your discharge paperwork or call the [facility] Appointment Desk at [phone] to schedule an appointment.     § If you require follow up care from a specialist and are unable to schedule an appointment with them directly, please contact your Primary Care Provider on the next business day to set up a referral.      § Please take all medication as prescribed.

## 2023-06-15 NOTE — Clinical Note
"Randolph Blue (Landis) was seen and treated in our emergency department on 6/15/2023.  He may return to work on 06/19/2023.       If you have any questions or concerns, please don't hesitate to call.      GARRETT Anderson"

## 2023-06-16 ENCOUNTER — OFFICE VISIT (OUTPATIENT)
Dept: CARDIOLOGY | Facility: CLINIC | Age: 58
End: 2023-06-16
Payer: MEDICARE

## 2023-06-16 VITALS
DIASTOLIC BLOOD PRESSURE: 72 MMHG | RESPIRATION RATE: 15 BRPM | OXYGEN SATURATION: 82 % | HEIGHT: 67 IN | SYSTOLIC BLOOD PRESSURE: 118 MMHG | WEIGHT: 256.81 LBS | BODY MASS INDEX: 40.31 KG/M2 | HEART RATE: 93 BPM

## 2023-06-16 DIAGNOSIS — E78.2 HYPERLIPIDEMIA, MIXED: ICD-10-CM

## 2023-06-16 DIAGNOSIS — I10 HYPERTENSION, UNSPECIFIED TYPE: Primary | ICD-10-CM

## 2023-06-16 DIAGNOSIS — E66.01 CLASS 3 SEVERE OBESITY DUE TO EXCESS CALORIES WITH SERIOUS COMORBIDITY AND BODY MASS INDEX (BMI) OF 40.0 TO 44.9 IN ADULT: ICD-10-CM

## 2023-06-16 DIAGNOSIS — R07.9 CHEST PAIN, UNSPECIFIED TYPE: ICD-10-CM

## 2023-06-16 DIAGNOSIS — F41.1 GAD (GENERALIZED ANXIETY DISORDER): ICD-10-CM

## 2023-06-16 PROCEDURE — 3008F BODY MASS INDEX DOCD: CPT | Mod: CPTII,S$GLB,, | Performed by: INTERNAL MEDICINE

## 2023-06-16 PROCEDURE — 99204 PR OFFICE/OUTPT VISIT, NEW, LEVL IV, 45-59 MIN: ICD-10-PCS | Mod: S$GLB,,, | Performed by: INTERNAL MEDICINE

## 2023-06-16 PROCEDURE — 3044F PR MOST RECENT HEMOGLOBIN A1C LEVEL <7.0%: ICD-10-PCS | Mod: CPTII,S$GLB,, | Performed by: INTERNAL MEDICINE

## 2023-06-16 PROCEDURE — 93000 ELECTROCARDIOGRAM COMPLETE: CPT | Mod: S$GLB,,, | Performed by: INTERNAL MEDICINE

## 2023-06-16 PROCEDURE — 3074F PR MOST RECENT SYSTOLIC BLOOD PRESSURE < 130 MM HG: ICD-10-PCS | Mod: CPTII,S$GLB,, | Performed by: INTERNAL MEDICINE

## 2023-06-16 PROCEDURE — 99999 PR PBB SHADOW E&M-EST. PATIENT-LVL V: ICD-10-PCS | Mod: PBBFAC,,, | Performed by: INTERNAL MEDICINE

## 2023-06-16 PROCEDURE — 99204 OFFICE O/P NEW MOD 45 MIN: CPT | Mod: S$GLB,,, | Performed by: INTERNAL MEDICINE

## 2023-06-16 PROCEDURE — 3044F HG A1C LEVEL LT 7.0%: CPT | Mod: CPTII,S$GLB,, | Performed by: INTERNAL MEDICINE

## 2023-06-16 PROCEDURE — 93000 EKG 12-LEAD: ICD-10-PCS | Mod: S$GLB,,, | Performed by: INTERNAL MEDICINE

## 2023-06-16 PROCEDURE — 3074F SYST BP LT 130 MM HG: CPT | Mod: CPTII,S$GLB,, | Performed by: INTERNAL MEDICINE

## 2023-06-16 PROCEDURE — 1160F RVW MEDS BY RX/DR IN RCRD: CPT | Mod: CPTII,S$GLB,, | Performed by: INTERNAL MEDICINE

## 2023-06-16 PROCEDURE — 4010F PR ACE/ARB THEARPY RXD/TAKEN: ICD-10-PCS | Mod: CPTII,S$GLB,, | Performed by: INTERNAL MEDICINE

## 2023-06-16 PROCEDURE — 3078F PR MOST RECENT DIASTOLIC BLOOD PRESSURE < 80 MM HG: ICD-10-PCS | Mod: CPTII,S$GLB,, | Performed by: INTERNAL MEDICINE

## 2023-06-16 PROCEDURE — 1159F PR MEDICATION LIST DOCUMENTED IN MEDICAL RECORD: ICD-10-PCS | Mod: CPTII,S$GLB,, | Performed by: INTERNAL MEDICINE

## 2023-06-16 PROCEDURE — 1160F PR REVIEW ALL MEDS BY PRESCRIBER/CLIN PHARMACIST DOCUMENTED: ICD-10-PCS | Mod: CPTII,S$GLB,, | Performed by: INTERNAL MEDICINE

## 2023-06-16 PROCEDURE — 1159F MED LIST DOCD IN RCRD: CPT | Mod: CPTII,S$GLB,, | Performed by: INTERNAL MEDICINE

## 2023-06-16 PROCEDURE — 3078F DIAST BP <80 MM HG: CPT | Mod: CPTII,S$GLB,, | Performed by: INTERNAL MEDICINE

## 2023-06-16 PROCEDURE — 99999 PR PBB SHADOW E&M-EST. PATIENT-LVL V: CPT | Mod: PBBFAC,,, | Performed by: INTERNAL MEDICINE

## 2023-06-16 PROCEDURE — 4010F ACE/ARB THERAPY RXD/TAKEN: CPT | Mod: CPTII,S$GLB,, | Performed by: INTERNAL MEDICINE

## 2023-06-16 PROCEDURE — 3008F PR BODY MASS INDEX (BMI) DOCUMENTED: ICD-10-PCS | Mod: CPTII,S$GLB,, | Performed by: INTERNAL MEDICINE

## 2023-06-16 NOTE — PROGRESS NOTES
CARDIOVASCULAR CONSULTATION    REASON FOR CONSULT:   Randolph Blue is a 57 y.o. male who presents for evaluation    HISTORY OF PRESENT ILLNESS:     Patient is a pleasant 57-year-old man.  Has had a few episodes of chest pains.  Went to ER yesterday.  Has been referred here for further evaluation.  Chest pain was right-sided.  No particular aggravating or relieving factors.  States that he is had similar chest pain in the past also.  Denies any orthopnea, PND, swelling of feet      PAST MEDICAL HISTORY:     Past Medical History:   Diagnosis Date    Arthritis     Bronchitis     Cervical arthritis     SILVIA (generalized anxiety disorder) 01/03/2014    GERD (gastroesophageal reflux disease)     Glaucoma     Hyperlipidemia, mixed 04/03/2014       PAST SURGICAL HISTORY:     Past Surgical History:   Procedure Laterality Date    KNEE SURGERY      bilateral    MANDIBLE FRACTURE SURGERY      PROSTATE SURGERY      SHOULDER SURGERY      [place] at [facility].        ALLERGIES AND MEDICATION:     Review of patient's allergies indicates:   Allergen Reactions    Percocet [oxycodone-acetaminophen]      Other reaction(s): Rash        Medication List            Accurate as of June 16, 2023 11:02 AM. If you have any questions, ask your nurse or doctor.                CONTINUE taking these medications      acetaminophen 650 MG Tbsr  Commonly known as: TYLENOL  Take 1 tablet (650 mg total) by mouth every 8 (eight) hours. for 7 days     albuterol 90 mcg/actuation inhaler  Commonly known as: PROVENTIL/VENTOLIN HFA     amitriptyline 100 MG tablet  Commonly known as: ELAVIL     C-TUB Misc  Generic drug: miscellaneous medical supply     CENTRUM MEN 50 PLUS MINIS ORAL     chlorhexidine 0.12 % solution  Commonly known as: PERIDEX     FLUoxetine 40 MG capsule     hydroCHLOROthiazide 25 MG tablet  Commonly known as: HYDRODIURIL  Take 1 tablet (25 mg total) by mouth once daily.     ibuprofen 600 MG tablet  Commonly known as:  ADVIL,MOTRIN  Take 1 tablet (600 mg total) by mouth every 6 (six) hours as needed for Pain.     latanoprost 0.005 % ophthalmic solution     * LUPRON DEPOT (6 MONTH) 45 mg Sykt injection  Generic drug: leuprolide acetate (6 month)     * leuprolide acetate (6 month) 45 mg injection  Commonly known as: ELIGARD     * leuprolide acetate (6 month) 45 mg injection  Commonly known as: ELIGARD     losartan 50 MG tablet  Commonly known as: COZAAR  Take 1 tablet (50 mg total) by mouth once daily.     methocarbamoL 500 MG Tab  Commonly known as: ROBAXIN  Take 2 tablets (1,000 mg total) by mouth 3 (three) times daily. for 5 days     rosuvastatin 10 MG tablet  Commonly known as: CRESTOR  Take 1 tablet (10 mg total) by mouth every evening.           * This list has 3 medication(s) that are the same as other medications prescribed for you. Read the directions carefully, and ask your doctor or other care provider to review them with you.                  SOCIAL HISTORY:     Social History     Socioeconomic History    Marital status: Single   Tobacco Use    Smoking status: Never    Smokeless tobacco: Never   Substance and Sexual Activity    Alcohol use: Not Currently     Alcohol/week: 3.3 - 4.2 standard drinks     Types: 4 - 5 Standard drinks or equivalent per week     Comment: No alcohol x 2 weeks    Drug use: No    Sexual activity: Not Currently       FAMILY HISTORY:     Family History   Problem Relation Age of Onset    Cancer Mother     Hypertension Mother     Heart disease Father     Hypertension Maternal Uncle     Hypertension Paternal Uncle     Hypertension Maternal Grandmother     Heart disease Paternal Grandmother     Hypertension Paternal Grandmother        REVIEW OF SYSTEMS:   Review of Systems   Constitutional: Negative.   HENT: Negative.     Eyes: Negative.    Cardiovascular:  Positive for chest pain.   Respiratory: Negative.     Endocrine: Negative.    Hematologic/Lymphatic: Negative.    Skin: Negative.   "  Musculoskeletal: Negative.    Gastrointestinal: Negative.    Genitourinary: Negative.    Neurological: Negative.    Psychiatric/Behavioral: Negative.     Allergic/Immunologic: Negative.      A 10 point review of systems was performed and all the pertinent positives have been mentioned. Rest of review of systems was negative.        PHYSICAL EXAM:     Vitals:    06/16/23 1040   BP: 118/72   Pulse: 93   Resp: 15    Body mass index is 40.23 kg/m².  Weight: 116.5 kg (256 lb 13.4 oz)   Height: 5' 7" (170.2 cm)     Physical Exam  Vitals reviewed.   Constitutional:       Appearance: He is well-developed.   HENT:      Head: Normocephalic.   Eyes:      Conjunctiva/sclera: Conjunctivae normal.      Pupils: Pupils are equal, round, and reactive to light.   Cardiovascular:      Rate and Rhythm: Normal rate and regular rhythm.      Heart sounds: Normal heart sounds.   Pulmonary:      Effort: Pulmonary effort is normal.      Breath sounds: Normal breath sounds.   Abdominal:      General: Bowel sounds are normal.      Palpations: Abdomen is soft.   Musculoskeletal:      Cervical back: Normal range of motion and neck supple.   Skin:     General: Skin is warm.   Neurological:      Mental Status: He is alert and oriented to person, place, and time.         DATA:     Laboratory:  CBC:  Recent Labs   Lab 05/31/23  0735   WBC 5.20   Hemoglobin 13.0 L   Hematocrit 40.6   Platelets 309       CHEMISTRIES:  Recent Labs   Lab 11/30/22  1613 05/31/23  0735   Glucose 102 102   Sodium 140 139   Potassium 3.8 3.1 L   BUN 18 14   Creatinine 0.9 0.8   Calcium 10.0 9.8       CARDIAC BIOMARKERS:        COAGS:  Recent Labs   Lab 06/15/23  1034   INR 1.5 H       LIPIDS/LFTS:  Recent Labs   Lab 05/31/22  1604 11/30/22  1613 05/31/23  0735   Cholesterol 169  --  156   Triglycerides 145  --  139   HDL 44  --  38 L   LDL Cholesterol 96.0  --  90.2   Non-HDL Cholesterol 125  --  118   AST  --  19 19   ALT  --  17 17       Hemoglobin A1C   Date Value Ref " Range Status   05/31/2023 5.8 (H) 4.0 - 5.6 % Final     Comment:     ADA Screening Guidelines:  5.7-6.4%  Consistent with prediabetes  >or=6.5%  Consistent with diabetes    High levels of fetal hemoglobin interfere with the HbA1C  assay. Heterozygous hemoglobin variants (HbS, HgC, etc)do  not significantly interfere with this assay.   However, presence of multiple variants may affect accuracy.     11/30/2022 5.9 (H) 4.0 - 5.6 % Final     Comment:     ADA Screening Guidelines:  5.7-6.4%  Consistent with prediabetes  >or=6.5%  Consistent with diabetes    High levels of fetal hemoglobin interfere with the HbA1C  assay. Heterozygous hemoglobin variants (HbS, HgC, etc)do  not significantly interfere with this assay.   However, presence of multiple variants may affect accuracy.     05/31/2022 6.0 (H) 4.0 - 5.6 % Final     Comment:     ADA Screening Guidelines:  5.7-6.4%  Consistent with prediabetes  >or=6.5%  Consistent with diabetes    High levels of fetal hemoglobin interfere with the HbA1C  assay. Heterozygous hemoglobin variants (HbS, HgC, etc)do  not significantly interfere with this assay.   However, presence of multiple variants may affect accuracy.         TSH        The 10-year ASCVD risk score (Ashvin DK, et al., 2019) is: 11.2%    Values used to calculate the score:      Age: 57 years      Sex: Male      Is Non- : Yes      Diabetic: No      Tobacco smoker: No      Systolic Blood Pressure: 118 mmHg      Is BP treated: Yes      HDL Cholesterol: 38 mg/dL      Total Cholesterol: 156 mg/dL       BNP    No results found for: BNP          ASSESSMENT AND PLAN     Patient Active Problem List   Diagnosis    BPH (benign prostatic hyperplasia)    Anxiety    SILVIA (generalized anxiety disorder)    Hyperlipidemia, mixed    Acute renal failure syndrome    Dehydration    Class 3 severe obesity due to excess calories with serious comorbidity and body mass index (BMI) of 40.0 to 44.9 in adult       Atypical  chest pain in a patient with multiple risk factors for coronary artery disease.  Further evaluation with the help of stress echocardiogram      Follow-up after testing          Thank you very much for involving me in the care of your patient.  Please do not hesitate to contact me if there are any questions.      Marilu Brady MD, FACC, [facility]  Interventional Cardiologist, [facility].           This note was dictated with the help of speech recognition software.  There might be un-intended errors and/or substitutions.

## 2023-06-22 ENCOUNTER — OFFICE VISIT (OUTPATIENT)
Dept: FAMILY MEDICINE | Facility: CLINIC | Age: 58
End: 2023-06-22
Payer: MEDICARE

## 2023-06-22 ENCOUNTER — LAB VISIT (OUTPATIENT)
Dept: LAB | Facility: HOSPITAL | Age: 58
End: 2023-06-22
Attending: FAMILY MEDICINE
Payer: MEDICARE

## 2023-06-22 VITALS
TEMPERATURE: 98 F | WEIGHT: 262.81 LBS | BODY MASS INDEX: 41.25 KG/M2 | OXYGEN SATURATION: 96 % | HEIGHT: 67 IN | DIASTOLIC BLOOD PRESSURE: 68 MMHG | SYSTOLIC BLOOD PRESSURE: 104 MMHG | HEART RATE: 94 BPM

## 2023-06-22 DIAGNOSIS — I10 ESSENTIAL HYPERTENSION: ICD-10-CM

## 2023-06-22 DIAGNOSIS — R07.89 ATYPICAL CHEST PAIN: ICD-10-CM

## 2023-06-22 DIAGNOSIS — D64.9 ANEMIA, UNSPECIFIED TYPE: ICD-10-CM

## 2023-06-22 DIAGNOSIS — R73.03 PREDIABETES: ICD-10-CM

## 2023-06-22 DIAGNOSIS — D53.9 ANEMIA, DEFICIENCY: ICD-10-CM

## 2023-06-22 DIAGNOSIS — J45.901 ASTHMA WITH ACUTE EXACERBATION, UNSPECIFIED ASTHMA SEVERITY, UNSPECIFIED WHETHER PERSISTENT: Primary | ICD-10-CM

## 2023-06-22 DIAGNOSIS — E78.2 HYPERLIPIDEMIA, MIXED: ICD-10-CM

## 2023-06-22 LAB
FERRITIN SERPL-MCNC: 372 NG/ML (ref 20–300)
FOLATE SERPL-MCNC: 14.4 NG/ML (ref 4–24)
HAPTOGLOB SERPL-MCNC: 339 MG/DL (ref 30–250)
IRON SERPL-MCNC: 52 UG/DL (ref 45–160)
RETICS/RBC NFR AUTO: 1.4 % (ref 0.4–2)
SATURATED IRON: 15 % (ref 20–50)
TOTAL IRON BINDING CAPACITY: 348 UG/DL (ref 250–450)
TRANSFERRIN SERPL-MCNC: 235 MG/DL (ref 200–375)
TSH SERPL DL<=0.005 MIU/L-ACNC: 2.58 UIU/ML (ref 0.4–4)
VIT B12 SERPL-MCNC: 539 PG/ML (ref 210–950)

## 2023-06-22 PROCEDURE — 99999 PR PBB SHADOW E&M-EST. PATIENT-LVL III: ICD-10-PCS | Mod: PBBFAC,,, | Performed by: FAMILY MEDICINE

## 2023-06-22 PROCEDURE — 36415 COLL VENOUS BLD VENIPUNCTURE: CPT | Mod: PO | Performed by: FAMILY MEDICINE

## 2023-06-22 PROCEDURE — 82607 VITAMIN B-12: CPT | Performed by: FAMILY MEDICINE

## 2023-06-22 PROCEDURE — 3008F BODY MASS INDEX DOCD: CPT | Mod: CPTII,S$GLB,, | Performed by: FAMILY MEDICINE

## 2023-06-22 PROCEDURE — 3074F SYST BP LT 130 MM HG: CPT | Mod: CPTII,S$GLB,, | Performed by: FAMILY MEDICINE

## 2023-06-22 PROCEDURE — 3078F DIAST BP <80 MM HG: CPT | Mod: CPTII,S$GLB,, | Performed by: FAMILY MEDICINE

## 2023-06-22 PROCEDURE — 3044F HG A1C LEVEL LT 7.0%: CPT | Mod: CPTII,S$GLB,, | Performed by: FAMILY MEDICINE

## 2023-06-22 PROCEDURE — 4010F PR ACE/ARB THEARPY RXD/TAKEN: ICD-10-PCS | Mod: CPTII,S$GLB,, | Performed by: FAMILY MEDICINE

## 2023-06-22 PROCEDURE — 85045 AUTOMATED RETICULOCYTE COUNT: CPT | Performed by: FAMILY MEDICINE

## 2023-06-22 PROCEDURE — 82746 ASSAY OF FOLIC ACID SERUM: CPT | Performed by: FAMILY MEDICINE

## 2023-06-22 PROCEDURE — 4010F ACE/ARB THERAPY RXD/TAKEN: CPT | Mod: CPTII,S$GLB,, | Performed by: FAMILY MEDICINE

## 2023-06-22 PROCEDURE — 3044F PR MOST RECENT HEMOGLOBIN A1C LEVEL <7.0%: ICD-10-PCS | Mod: CPTII,S$GLB,, | Performed by: FAMILY MEDICINE

## 2023-06-22 PROCEDURE — 3008F PR BODY MASS INDEX (BMI) DOCUMENTED: ICD-10-PCS | Mod: CPTII,S$GLB,, | Performed by: FAMILY MEDICINE

## 2023-06-22 PROCEDURE — 99999 PR PBB SHADOW E&M-EST. PATIENT-LVL III: CPT | Mod: PBBFAC,,, | Performed by: FAMILY MEDICINE

## 2023-06-22 PROCEDURE — 1159F PR MEDICATION LIST DOCUMENTED IN MEDICAL RECORD: ICD-10-PCS | Mod: CPTII,S$GLB,, | Performed by: FAMILY MEDICINE

## 2023-06-22 PROCEDURE — 84443 ASSAY THYROID STIM HORMONE: CPT | Performed by: FAMILY MEDICINE

## 2023-06-22 PROCEDURE — 1159F MED LIST DOCD IN RCRD: CPT | Mod: CPTII,S$GLB,, | Performed by: FAMILY MEDICINE

## 2023-06-22 PROCEDURE — 1160F RVW MEDS BY RX/DR IN RCRD: CPT | Mod: CPTII,S$GLB,, | Performed by: FAMILY MEDICINE

## 2023-06-22 PROCEDURE — 99214 OFFICE O/P EST MOD 30 MIN: CPT | Mod: S$GLB,,, | Performed by: FAMILY MEDICINE

## 2023-06-22 PROCEDURE — 3078F PR MOST RECENT DIASTOLIC BLOOD PRESSURE < 80 MM HG: ICD-10-PCS | Mod: CPTII,S$GLB,, | Performed by: FAMILY MEDICINE

## 2023-06-22 PROCEDURE — 1160F PR REVIEW ALL MEDS BY PRESCRIBER/CLIN PHARMACIST DOCUMENTED: ICD-10-PCS | Mod: CPTII,S$GLB,, | Performed by: FAMILY MEDICINE

## 2023-06-22 PROCEDURE — 99214 PR OFFICE/OUTPT VISIT, EST, LEVL IV, 30-39 MIN: ICD-10-PCS | Mod: S$GLB,,, | Performed by: FAMILY MEDICINE

## 2023-06-22 PROCEDURE — 82728 ASSAY OF FERRITIN: CPT | Performed by: FAMILY MEDICINE

## 2023-06-22 PROCEDURE — 84466 ASSAY OF TRANSFERRIN: CPT | Performed by: FAMILY MEDICINE

## 2023-06-22 PROCEDURE — 83010 ASSAY OF HAPTOGLOBIN QUANT: CPT | Performed by: FAMILY MEDICINE

## 2023-06-22 PROCEDURE — 3074F PR MOST RECENT SYSTOLIC BLOOD PRESSURE < 130 MM HG: ICD-10-PCS | Mod: CPTII,S$GLB,, | Performed by: FAMILY MEDICINE

## 2023-06-22 RX ORDER — PROMETHAZINE HYDROCHLORIDE AND DEXTROMETHORPHAN HYDROBROMIDE 6.25; 15 MG/5ML; MG/5ML
5 SYRUP ORAL
Qty: 180 ML | Refills: 0 | Status: SHIPPED | OUTPATIENT
Start: 2023-06-22 | End: 2023-06-29

## 2023-06-22 RX ORDER — PREDNISONE 20 MG/1
20 TABLET ORAL 2 TIMES DAILY
Qty: 10 TABLET | Refills: 0 | Status: SHIPPED | OUTPATIENT
Start: 2023-06-22 | End: 2023-06-27

## 2023-06-22 RX ORDER — AMOXICILLIN 875 MG/1
TABLET, FILM COATED ORAL
COMMUNITY
Start: 2023-02-03 | End: 2023-12-04

## 2023-06-22 RX ORDER — ALBUTEROL SULFATE 90 UG/1
1-2 AEROSOL, METERED RESPIRATORY (INHALATION) EVERY 4 HOURS PRN
Qty: 18 G | Refills: 11 | Status: SHIPPED | OUTPATIENT
Start: 2023-06-22

## 2023-06-22 RX ORDER — IBUPROFEN 800 MG/1
TABLET ORAL
COMMUNITY
Start: 2023-02-03 | End: 2023-09-25

## 2023-06-22 RX ORDER — MONTELUKAST SODIUM 10 MG/1
10 TABLET ORAL NIGHTLY
Qty: 30 TABLET | Refills: 11 | Status: ON HOLD | OUTPATIENT
Start: 2023-06-22 | End: 2024-04-02 | Stop reason: HOSPADM

## 2023-06-22 NOTE — PROGRESS NOTES
Assessment & Plan:    Asthma with acute exacerbation, unspecified asthma severity, unspecified whether persistent  -     promethazine-dextromethorphan (PROMETHAZINE-DM) 6.25-15 mg/5 mL Syrp; Take 5 mLs by mouth every 4 to 6 hours as needed (cough).  Dispense: 180 mL; Refill: 0  -     albuterol (PROVENTIL/VENTOLIN HFA) 90 mcg/actuation inhaler; Inhale 1-2 puffs into the lungs every 4 (four) hours as needed for Wheezing.  Dispense: 18 g; Refill: 11  -     predniSONE (DELTASONE) 20 MG tablet; Take 1 tablet (20 mg total) by mouth 2 (two) times daily. for 5 days  Dispense: 10 tablet; Refill: 0  -     montelukast (SINGULAIR) 10 mg tablet; Take 1 tablet (10 mg total) by mouth every evening.  Dispense: 30 tablet; Refill: 11    Treat with steroid.   Albuterol, cough syrup refilled.  Start Singulair qhs.   May need PFT if no improvement.     Atypical chest pain  Resolved. Patient has workup scheduled through Cardiology dept.    Essential hypertension  Controlled. Continue current therapy.     Prediabetes  Discussed labs in further detail. Advised to limit food/beverages with processed sugar.    Hyperlipidemia, mixed  Controlled overall. HDL is low but the rest of his panel is WNL. Continue statin.    Anemia, unspecified type  Follow up workup is in process.       Follow-up: No follow-ups on file.  ______________________________________________________________________    Chief Complaint  Chief Complaint   Patient presents with    Cough       HPI  Randolph Blue is a 57 y.o. male with medical diagnoses as listed in the medical history and problem list that presents to the office to follow up on a recent ER visit for right-sided CP on 6/15. Please see ER encounter for further details. His workup was unremarkable. He was discharged home with Tylenol, ibuprofen, and Robaxin and instructed to follow up with Cardiology. Patient saw the Cardiologist on 6/16 and an echo and stress were ordered.     Today, patient states he continues to  have a cough since his last visit, despite taking the antibiotic. Cough is worse at night. Endorses wheezing. Requesting refill of cough syrup and albuterol inhaler. Reports history of asthma diagnosed during childhood. Denies any further episodes of CP.     Health Maintenance         Date Due Completion Date    COVID-19 Vaccine (1) Never done ---    Shingles Vaccine (1 of 2) Never done ---    TETANUS VACCINE 06/01/2032 (Originally 6/23/1983) ---    Influenza Vaccine (Season Ended) 09/01/2023 ---    Hemoglobin A1c (Prediabetes) 05/31/2024 5/31/2023    Colorectal Cancer Screening 04/10/2025 4/10/2015    Lipid Panel 05/31/2028 5/31/2023              PAST MEDICAL HISTORY:  Past Medical History:   Diagnosis Date    Arthritis     Bronchitis     Cervical arthritis     SILVIA (generalized anxiety disorder) 01/03/2014    GERD (gastroesophageal reflux disease)     Glaucoma     Hyperlipidemia, mixed 04/03/2014       PAST SURGICAL HISTORY:  Past Surgical History:   Procedure Laterality Date    KNEE SURGERY      bilateral    MANDIBLE FRACTURE SURGERY      PROSTATE SURGERY      SHOULDER SURGERY      [place] at [facility].        SOCIAL HISTORY:  Social History     Socioeconomic History    Marital status: Single   Tobacco Use    Smoking status: Never    Smokeless tobacco: Never   Substance and Sexual Activity    Alcohol use: Not Currently     Alcohol/week: 3.3 - 4.2 standard drinks     Types: 4 - 5 Standard drinks or equivalent per week     Comment: No alcohol x 2 weeks    Drug use: No    Sexual activity: Not Currently       FAMILY HISTORY:  Family History   Problem Relation Age of Onset    Cancer Mother     Hypertension Mother     Heart disease Father     Hypertension Maternal Uncle     Hypertension Paternal Uncle     Hypertension Maternal Grandmother     Heart disease Paternal Grandmother     Hypertension Paternal Grandmother        ALLERGIES AND MEDICATIONS: updated and reviewed.  Review of patient's allergies indicates:    Allergen Reactions    Percocet [oxycodone-acetaminophen]      Other reaction(s): Rash     Current Outpatient Medications   Medication Sig Dispense Refill    acetaminophen (TYLENOL) 650 MG TbSR Take 1 tablet (650 mg total) by mouth every 8 (eight) hours. for 7 days 20 tablet 0    amitriptyline (ELAVIL) 100 MG tablet Take by mouth.      amoxicillin (AMOXIL) 875 MG tablet TAKE 1 TABLET BY MOUTH EVERY 12 HOURS UNTIL GONE      chlorhexidine (PERIDEX) 0.12 % solution       FLUoxetine 40 MG capsule Take 40 mg by mouth once daily.      hydroCHLOROthiazide (HYDRODIURIL) 25 MG tablet Take 1 tablet (25 mg total) by mouth once daily. 90 tablet 3    ibuprofen (ADVIL,MOTRIN) 800 MG tablet TAKE 1 TABLET BY MOUTH EVERY 6 HOURS AS NEEDED FOR PAIN FOR 3 DAYS AFTER SURGERY . DO NOT TAKE ON AN EMPTY STOMACH      latanoprost 0.005 % ophthalmic solution       leuprolide acetate, 6 month, (ELIGARD) 45 mg injection Inject 45 mg into the skin every 6 (six) months.      leuprolide, 6 month, (ELIGARD) 45 mg injection Inject 45 mg into the skin.      losartan (COZAAR) 50 MG tablet Take 1 tablet (50 mg total) by mouth once daily. 90 tablet 3    LUPRON DEPOT, 6 MONTH, 45 mg SyKt injection Inject into the muscle.      miscellaneous medical supply (C-TUB) Misc vacuum erection device      mv-min/folic/K1/lycopen/lutein (CENTRUM MEN 50 PLUS MINIS ORAL) Take by mouth.      rosuvastatin (CRESTOR) 10 MG tablet Take 1 tablet (10 mg total) by mouth every evening. 90 tablet 3    albuterol (PROVENTIL/VENTOLIN HFA) 90 mcg/actuation inhaler Inhale 1-2 puffs into the lungs every 4 (four) hours as needed for Wheezing. 18 g 11    montelukast (SINGULAIR) 10 mg tablet Take 1 tablet (10 mg total) by mouth every evening. 30 tablet 11    predniSONE (DELTASONE) 20 MG tablet Take 1 tablet (20 mg total) by mouth 2 (two) times daily. for 5 days 10 tablet 0    promethazine-dextromethorphan (PROMETHAZINE-DM) 6.25-15 mg/5 mL Syrp Take 5 mLs by mouth every 4 to 6 hours as  "needed (cough). 180 mL 0     No current facility-administered medications for this visit.         ROS  Review of Systems   Constitutional:  Negative for activity change, fever and unexpected weight change.   HENT:  Negative for congestion, postnasal drip, sinus pressure and sore throat.    Eyes:  Negative for photophobia and visual disturbance.   Respiratory:  Positive for cough and wheezing. Negative for chest tightness and shortness of breath.    Cardiovascular:  Negative for chest pain and leg swelling.   Gastrointestinal:  Negative for abdominal pain, constipation, diarrhea, nausea and vomiting.   Endocrine: Negative for polydipsia, polyphagia and polyuria.   Genitourinary:  Negative for dysuria and urgency.   Musculoskeletal:  Negative for arthralgias and gait problem.   Skin:  Negative for color change.   Neurological:  Negative for dizziness, weakness and headaches.   Psychiatric/Behavioral:  Negative for dysphoric mood and sleep disturbance. The patient is not nervous/anxious.          Physical Exam  Vitals:    06/22/23 1629   BP: 104/68   BP Location: Right arm   Patient Position: Sitting   BP Method: Large (Manual)   Pulse: 94   Temp: 97.7 °F (36.5 °C)   TempSrc: Oral   SpO2: 96%   Weight: 119.2 kg (262 lb 12.6 oz)   Height: 5' 7" (1.702 m)    Body mass index is 41.16 kg/m².  Weight: 119.2 kg (262 lb 12.6 oz)   Height: 5' 7" (170.2 cm)   Physical Exam  Constitutional:       General: He is not in acute distress.     Appearance: Normal appearance.   HENT:      Head: Normocephalic and atraumatic.   Cardiovascular:      Rate and Rhythm: Normal rate and regular rhythm.      Pulses: Normal pulses.      Heart sounds: Normal heart sounds.   Pulmonary:      Effort: Pulmonary effort is normal. No respiratory distress.      Breath sounds: Normal breath sounds.   Musculoskeletal:      Right lower leg: No edema.      Left lower leg: No edema.   Skin:     General: Skin is warm and dry.      Findings: No rash. "   Neurological:      General: No focal deficit present.      Mental Status: He is alert and oriented to person, place, and time. Mental status is at baseline.   Psychiatric:         Mood and Affect: Mood normal.         Behavior: Behavior normal.         Thought Content: Thought content normal.            no

## 2023-07-18 ENCOUNTER — HOSPITAL ENCOUNTER (OUTPATIENT)
Dept: CARDIOLOGY | Facility: HOSPITAL | Age: 58
Discharge: HOME | End: 2023-07-18
Attending: INTERNAL MEDICINE
Payer: MEDICARE

## 2023-07-18 DIAGNOSIS — R07.9 CHEST PAIN, UNSPECIFIED TYPE: ICD-10-CM

## 2023-07-18 DIAGNOSIS — I10 HYPERTENSION, UNSPECIFIED TYPE: ICD-10-CM

## 2023-07-18 LAB
ASCENDING AORTA: 2.81 CM
AV INDEX (PROSTH): 0.83
AV MEAN GRADIENT: 3 MMHG
AV PEAK GRADIENT: 4 MMHG
AV VALVE AREA: 3.71 CM2
AV VELOCITY RATIO: 0.91
CV ECHO LV RWT: 0.38 CM
CV STRESS BASE HR: 66 BPM
DIASTOLIC BLOOD PRESSURE: 62 MMHG
DOP CALC AO PEAK VEL: 0.98 M/S
DOP CALC AO VTI: 22.3 CM
DOP CALC LVOT AREA: 4.4 CM2
DOP CALC LVOT DIAMETER: 2.38 CM
DOP CALC LVOT PEAK VEL: 0.89 M/S
DOP CALC LVOT STROKE VOLUME: 82.71 CM3
DOP CALCLVOT PEAK VEL VTI: 18.6 CM
E WAVE DECELERATION TIME: 153.67 MSEC
E/A RATIO: 1.05
E/E' RATIO: 6.2 M/S
ECHO LV POSTERIOR WALL: 0.87 CM (ref 0.6–1.1)
EJECTION FRACTION: 55 %
FRACTIONAL SHORTENING: 34 % (ref 28–44)
INTERVENTRICULAR SEPTUM: 0.86 CM (ref 0.6–1.1)
IVC DIAMETER: 1.6 CM
IVRT: 91.34 MSEC
LA MAJOR: 4.84 CM
LA MINOR: 5.19 CM
LA WIDTH: 3.9 CM
LEFT ATRIUM SIZE: 3.49 CM
LEFT ATRIUM VOLUME: 57.95 CM3
LEFT INTERNAL DIMENSION IN SYSTOLE: 3.02 CM (ref 2.1–4)
LEFT VENTRICLE DIASTOLIC VOLUME: 97.05 ML
LEFT VENTRICLE SYSTOLIC VOLUME: 35.48 ML
LEFT VENTRICULAR INTERNAL DIMENSION IN DIASTOLE: 4.59 CM (ref 3.5–6)
LEFT VENTRICULAR MASS: 130.17 G
LV LATERAL E/E' RATIO: 4.43 M/S
LV SEPTAL E/E' RATIO: 10.33 M/S
LVOT MG: 1.85 MMHG
LVOT MV: 0.65 CM/S
MV PEAK A VEL: 0.59 M/S
MV PEAK E VEL: 0.62 M/S
MV STENOSIS PRESSURE HALF TIME: 44.57 MS
MV VALVE AREA P 1/2 METHOD: 4.94 CM2
OHS CV CPX 1 MINUTE RECOVERY HEART RATE: 116 BPM
OHS CV CPX 85 PERCENT MAX PREDICTED HEART RATE MALE: 136
OHS CV CPX ESTIMATED METS: 6
OHS CV CPX MAX PREDICTED HEART RATE: 160
OHS CV CPX PATIENT IS FEMALE: 0
OHS CV CPX PATIENT IS MALE: 1
OHS CV CPX PEAK DIASTOLIC BLOOD PRESSURE: 36 MMHG
OHS CV CPX PEAK HEAR RATE: 157 BPM
OHS CV CPX PEAK RATE PRESSURE PRODUCT: NORMAL
OHS CV CPX PEAK SYSTOLIC BLOOD PRESSURE: 181 MMHG
OHS CV CPX PERCENT MAX PREDICTED HEART RATE ACHIEVED: 98
OHS CV CPX RATE PRESSURE PRODUCT PRESENTING: 7194
PISA TR MAX VEL: 2.59 M/S
PULM VEIN S/D RATIO: 1.15
PV PEAK D VEL: 0.34 M/S
PV PEAK S VEL: 0.39 M/S
PV PEAK VELOCITY: 0.72 CM/S
RA MAJOR: 4.51 CM
RA PRESSURE: 8 MMHG
RIGHT VENTRICULAR END-DIASTOLIC DIMENSION: 3.56 CM
RV TISSUE DOPPLER FREE WALL SYSTOLIC VELOCITY 1 (APICAL 4 CHAMBER VIEW): 11.61 CM/S
SINUS: 3.3 CM
STJ: 2.81 CM
STRESS ANGINA INDEX: 0
STRESS DUKE TREADMILL SCORE: -1
STRESS ECHO POST EXERCISE DUR MIN: 4 MINUTES
STRESS ECHO POST EXERCISE DUR SEC: 11 SECONDS
STRESS ST DEPRESSION: 1 MM
SYSTOLIC BLOOD PRESSURE: 109 MMHG
TDI LATERAL: 0.14 M/S
TDI SEPTAL: 0.06 M/S
TDI: 0.1 M/S
TR MAX PG: 27 MMHG
TRICUSPID ANNULAR PLANE SYSTOLIC EXCURSION: 2.09 CM
TV PEAK GRADIENT: 0.84 MMHG
TV REST PULMONARY ARTERY PRESSURE: 35 MMHG

## 2023-07-18 PROCEDURE — 93325 DOPPLER ECHO COLOR FLOW MAPG: CPT | Mod: 26,,, | Performed by: INTERNAL MEDICINE

## 2023-07-18 PROCEDURE — 93351 STRESS ECHO (CUPID ONLY): ICD-10-PCS | Mod: 26,,, | Performed by: INTERNAL MEDICINE

## 2023-07-18 PROCEDURE — 93320 STRESS ECHO (CUPID ONLY): ICD-10-PCS | Mod: 26,,, | Performed by: INTERNAL MEDICINE

## 2023-07-18 PROCEDURE — 93351 STRESS TTE COMPLETE: CPT | Mod: 26,,, | Performed by: INTERNAL MEDICINE

## 2023-07-18 PROCEDURE — 93320 DOPPLER ECHO COMPLETE: CPT | Mod: 26,,, | Performed by: INTERNAL MEDICINE

## 2023-07-18 PROCEDURE — 93325 DOPPLER ECHO COLOR FLOW MAPG: CPT

## 2023-07-18 PROCEDURE — 93325 STRESS ECHO (CUPID ONLY): ICD-10-PCS | Mod: 26,,, | Performed by: INTERNAL MEDICINE

## 2023-07-25 ENCOUNTER — TELEPHONE (OUTPATIENT)
Dept: FAMILY MEDICINE | Facility: CLINIC | Age: 58
End: 2023-07-25
Payer: MEDICARE

## 2023-07-25 NOTE — TELEPHONE ENCOUNTER
----- Message from Alida Hilton sent at 7/24/2023  3:50 PM CDT -----  Type: Patient Call Back    Who called:pt     What is the request in detail:pt requesting to get medical clearance for a surgery. Call pt     Can the clinic reply by [facility]?    Would the patient rather a call back or a response via [facility]? call    Best call back number:[phone] (home)       Additional Information:

## 2023-08-12 ENCOUNTER — HOSPITAL ENCOUNTER (EMERGENCY)
Facility: HOSPITAL | Age: 58
Discharge: HOME | End: 2023-08-12
Attending: EMERGENCY MEDICINE
Payer: MEDICARE

## 2023-08-12 VITALS
TEMPERATURE: 98 F | HEIGHT: 67 IN | DIASTOLIC BLOOD PRESSURE: 78 MMHG | WEIGHT: 255 LBS | RESPIRATION RATE: 18 BRPM | BODY MASS INDEX: 40.02 KG/M2 | HEART RATE: 86 BPM | OXYGEN SATURATION: 94 % | SYSTOLIC BLOOD PRESSURE: 133 MMHG

## 2023-08-12 DIAGNOSIS — Z20.822 CLOSE EXPOSURE TO COVID-19 VIRUS: ICD-10-CM

## 2023-08-12 DIAGNOSIS — R09.89 CHEST CONGESTION: ICD-10-CM

## 2023-08-12 DIAGNOSIS — R05.1 ACUTE COUGH: Primary | ICD-10-CM

## 2023-08-12 LAB
CTP QC/QA: YES
INFLUENZA A ANTIGEN, POC: NEGATIVE
INFLUENZA B ANTIGEN, POC: NEGATIVE
SARS-COV-2 RDRP RESP QL NAA+PROBE: NEGATIVE

## 2023-08-12 PROCEDURE — 87635 SARS-COV-2 COVID-19 AMP PRB: CPT | Mod: ER | Performed by: EMERGENCY MEDICINE

## 2023-08-12 PROCEDURE — 87804 INFLUENZA ASSAY W/OPTIC: CPT | Mod: 59,ER

## 2023-08-12 PROCEDURE — 99282 EMERGENCY DEPT VISIT SF MDM: CPT | Mod: ER

## 2023-08-12 NOTE — DISCHARGE INSTRUCTIONS
You may rotate tylenol and motrin for fevers 100.4 or greater.  Please return to the Emergency Department for any new or worsening symptoms including: fever, chest pain, shortness of breath, loss of consciousness, dizziness, weakness, or any other concerns.     Please follow up with your Primary Care Provider within in the week. If you do not have one, you may contact the one listed on your discharge paperwork or you may also call the [facility] Appointment Desk at [phone] to schedule an appointment with one.     Please take all medication as prescribed.

## 2023-08-13 NOTE — ED PROVIDER NOTES
Encounter Date: 8/12/2023       History     Chief Complaint   Patient presents with    URI     C/o cough, congestion, headache, body aches x5 days. Reports being around sick people. Denies chest pain, SOB.     Patient is a 58-year-old male with a past medical history of GERD, hypertension, hyperlipidemia, arthritis, anxiety who presents to the ED for evaluation of headache, productive cough with white sputum, chest congestion x 2 days. Reports wife here being seen here for same symptoms. Reports other family members tested positive for COVID. Denies any fevers, rhinorrhea, sore throat. Denies any chest pain, shortness of breath, abdominal pain, n/v/d.     The history is provided by the patient.     Review of patient's allergies indicates:   Allergen Reactions    Percocet [oxycodone-acetaminophen]      Other reaction(s): Rash     Past Medical History:   Diagnosis Date    Arthritis     Bronchitis     Cervical arthritis     SILVIA (generalized anxiety disorder) 01/03/2014    GERD (gastroesophageal reflux disease)     Glaucoma     Hyperlipidemia, mixed 04/03/2014    Hypertension      Past Surgical History:   Procedure Laterality Date    KNEE SURGERY      bilateral    MANDIBLE FRACTURE SURGERY      PROSTATE SURGERY      SHOULDER SURGERY      [place] at [facility].      Family History   Problem Relation Age of Onset    Cancer Mother     Hypertension Mother     Heart disease Father     Hypertension Maternal Uncle     Hypertension Paternal Uncle     Hypertension Maternal Grandmother     Heart disease Paternal Grandmother     Hypertension Paternal Grandmother      Social History     Tobacco Use    Smoking status: Never    Smokeless tobacco: Never   Substance Use Topics    Alcohol use: Not Currently     Alcohol/week: 3.3 - 4.2 standard drinks of alcohol     Types: 4 - 5 Standard drinks or equivalent per week     Comment: No alcohol x 2 weeks    Drug use: No     Review of Systems   Constitutional:  Negative for fever.    HENT:  Positive for congestion. Negative for rhinorrhea and sore throat.    Respiratory:  Positive for cough. Negative for shortness of breath.    Cardiovascular:  Negative for chest pain.   Gastrointestinal:  Negative for abdominal pain, diarrhea, nausea and vomiting.   Neurological:  Positive for headaches.   All other systems reviewed and are negative.      Physical Exam     Initial Vitals [08/12/23 1730]   BP Pulse Resp Temp SpO2   133/78 86 18 98.4 °F (36.9 °C) (!) 94 %      MAP       --         Physical Exam    Nursing note and vitals reviewed.  Constitutional: He appears well-developed and well-nourished.   HENT:   Head: Normocephalic and atraumatic.   Right Ear: External ear normal.   Left Ear: External ear normal.   Cardiovascular:  Normal rate, regular rhythm, normal heart sounds and intact distal pulses.     Exam reveals no gallop and no friction rub.       No murmur heard.  Pulmonary/Chest: Breath sounds normal. No respiratory distress. He has no wheezes. He has no rhonchi. He has no rales.   Abdominal: Abdomen is soft. Bowel sounds are normal. He exhibits no distension. There is no abdominal tenderness. There is no rebound and no guarding.   Musculoskeletal:         General: Normal range of motion.     Neurological: He is alert and oriented to person, place, and time. GCS score is 15. GCS eye subscore is 4. GCS verbal subscore is 5. GCS motor subscore is 6.   Psychiatric: He has a normal mood and affect.         ED Course   Procedures  Labs Reviewed   SARS-COV-2 RDRP GENE    Narrative:     This test utilizes isothermal nucleic acid amplification technology to detect the SARS-CoV-2 RdRp nucleic acid segment. The analytical sensitivity (limit of detection) is 500 copies/swab.     A POSITIVE result is indicative of the presence of SARS-CoV-2 RNA; clinical correlation with patient history and other diagnostic information is necessary to determine patient infection status.    A NEGATIVE result means that  SARS-CoV-2 nucleic acids are not present above the limit of detection. A NEGATIVE result should be treated as presumptive. It does not rule out the possibility of COVID-19 and should not be the sole basis for treatment decisions. If COVID-19 is strongly suspected based on clinical and exposure history, re-testing using an alternate molecular assay should be considered.     This test is only for use under the Food and Drug Administration s Emergency Use Authorization (EUA).     Commercial kits are provided by [employer]. Performance characteristics of the EUA have been independently verified by [facility] Department of Pathology and Laboratory Medicine.   _________________________________________________________________   The authorized Fact Sheet for Healthcare Providers and the authorized Fact Sheet for Patients of the ID NOW COVID-19 are available on the FDA website:    https://www.fda.gov/media/792918/download      https://www.fda.gov/media/503895/download      POCT INFLUENZA A/B MOLECULAR   POCT RAPID INFLUENZA A/B          Imaging Results    None          Medications - No data to display  Medical Decision Making:   ED Management:  This is an evaluation of a 58-year-old male with a past medical history of GERD, hypertension, hyperlipidemia, arthritis, anxiety who presents to the ED for evaluation of headache, productive cough with white sputum, chest congestion x 2 days.  Physical exam unremarkable. Regular rate rhythm without murmurs rubs or gallops. Lungs are clear to auscultation bilaterally. Abdomen soft, nontender, nondistended, with normal bowel sounds. Differential diagnosis includes but is not limited to COVID, flu, pneumonia, other viral illness. Workup initiated with COVID, flu swabs. COVID and flu swabs negative. I am highly suspicious that the patient's COVID swab was not a good sample. Patient's wife here in ED with a positive COVID test, other family members with positive COVID  test. I am not concerned about pneumonia at this time given normal lung exam and the fact that patient has been afebrile, CXR not needed at this time. Instructed patient to abide by COVID isolation guidelines. Will treat symptoms. Offered to send in Tylenol, Motrin, cough syrup, but patient reports having medications at home. Discussed return precautions with patient. Instructed follow-up with primary care provider                          Clinical Impression:   Final diagnoses:  [R05.1] Acute cough (Primary)  [R09.89] Chest congestion  [Z20.822] Close exposure to COVID-19 virus        ED Disposition Condition    Discharge Stable          ED Prescriptions    None       Follow-up Information       Follow up With Specialties Details Why Contact Info    Cee Solo, DO Family Medicine Schedule an appointment as soon as possible for a visit in 1 week for follow up [address] [facility] [place] [address]  [phone]      [facility] ED Emergency Medicine Go to  As needed, If symptoms worsen, or new symptoms develop [address] [facility] [address] [phone]             Cory Manzo PA-C  08/12/23 2028       Cory Manzo PA-C  08/12/23 2029

## 2023-08-21 ENCOUNTER — OFFICE VISIT (OUTPATIENT)
Dept: FAMILY MEDICINE | Facility: CLINIC | Age: 58
End: 2023-08-21
Payer: MEDICARE

## 2023-08-21 DIAGNOSIS — R07.9 CHEST PAIN, UNSPECIFIED TYPE: ICD-10-CM

## 2023-08-21 DIAGNOSIS — R94.5 ABNORMAL RESULTS OF LIVER FUNCTION STUDIES: ICD-10-CM

## 2023-08-21 DIAGNOSIS — R79.1 ABNORMAL COAGULATION PROFILE: ICD-10-CM

## 2023-08-21 DIAGNOSIS — E78.2 HYPERLIPIDEMIA, MIXED: ICD-10-CM

## 2023-08-21 DIAGNOSIS — Z01.818 PREOPERATIVE EXAMINATION: Primary | ICD-10-CM

## 2023-08-21 DIAGNOSIS — F41.1 GAD (GENERALIZED ANXIETY DISORDER): ICD-10-CM

## 2023-08-21 DIAGNOSIS — N40.0 BENIGN PROSTATIC HYPERPLASIA, UNSPECIFIED WHETHER LOWER URINARY TRACT SYMPTOMS PRESENT: ICD-10-CM

## 2023-08-21 DIAGNOSIS — E66.01 CLASS 3 SEVERE OBESITY DUE TO EXCESS CALORIES WITH SERIOUS COMORBIDITY AND BODY MASS INDEX (BMI) OF 40.0 TO 44.9 IN ADULT: ICD-10-CM

## 2023-08-21 PROCEDURE — 99215 OFFICE O/P EST HI 40 MIN: CPT | Mod: S$GLB,,, | Performed by: FAMILY MEDICINE

## 2023-08-21 PROCEDURE — 1159F MED LIST DOCD IN RCRD: CPT | Mod: CPTII,S$GLB,, | Performed by: FAMILY MEDICINE

## 2023-08-21 PROCEDURE — 3075F SYST BP GE 130 - 139MM HG: CPT | Mod: CPTII,S$GLB,, | Performed by: FAMILY MEDICINE

## 2023-08-21 PROCEDURE — 3044F PR MOST RECENT HEMOGLOBIN A1C LEVEL <7.0%: ICD-10-PCS | Mod: CPTII,S$GLB,, | Performed by: FAMILY MEDICINE

## 2023-08-21 PROCEDURE — 3008F BODY MASS INDEX DOCD: CPT | Mod: CPTII,S$GLB,, | Performed by: FAMILY MEDICINE

## 2023-08-21 PROCEDURE — 3008F PR BODY MASS INDEX (BMI) DOCUMENTED: ICD-10-PCS | Mod: CPTII,S$GLB,, | Performed by: FAMILY MEDICINE

## 2023-08-21 PROCEDURE — 3078F PR MOST RECENT DIASTOLIC BLOOD PRESSURE < 80 MM HG: ICD-10-PCS | Mod: CPTII,S$GLB,, | Performed by: FAMILY MEDICINE

## 2023-08-21 PROCEDURE — 3044F HG A1C LEVEL LT 7.0%: CPT | Mod: CPTII,S$GLB,, | Performed by: FAMILY MEDICINE

## 2023-08-21 PROCEDURE — 4010F PR ACE/ARB THEARPY RXD/TAKEN: ICD-10-PCS | Mod: CPTII,S$GLB,, | Performed by: FAMILY MEDICINE

## 2023-08-21 PROCEDURE — 99215 PR OFFICE/OUTPT VISIT, EST, LEVL V, 40-54 MIN: ICD-10-PCS | Mod: S$GLB,,, | Performed by: FAMILY MEDICINE

## 2023-08-21 PROCEDURE — 99999 PR PBB SHADOW E&M-EST. PATIENT-LVL IV: CPT | Mod: PBBFAC,,, | Performed by: FAMILY MEDICINE

## 2023-08-21 PROCEDURE — 99999 PR PBB SHADOW E&M-EST. PATIENT-LVL IV: ICD-10-PCS | Mod: PBBFAC,,, | Performed by: FAMILY MEDICINE

## 2023-08-21 PROCEDURE — 3078F DIAST BP <80 MM HG: CPT | Mod: CPTII,S$GLB,, | Performed by: FAMILY MEDICINE

## 2023-08-21 PROCEDURE — 1160F RVW MEDS BY RX/DR IN RCRD: CPT | Mod: CPTII,S$GLB,, | Performed by: FAMILY MEDICINE

## 2023-08-21 PROCEDURE — 1159F PR MEDICATION LIST DOCUMENTED IN MEDICAL RECORD: ICD-10-PCS | Mod: CPTII,S$GLB,, | Performed by: FAMILY MEDICINE

## 2023-08-21 PROCEDURE — 3075F PR MOST RECENT SYSTOLIC BLOOD PRESS GE 130-139MM HG: ICD-10-PCS | Mod: CPTII,S$GLB,, | Performed by: FAMILY MEDICINE

## 2023-08-21 PROCEDURE — 1160F PR REVIEW ALL MEDS BY PRESCRIBER/CLIN PHARMACIST DOCUMENTED: ICD-10-PCS | Mod: CPTII,S$GLB,, | Performed by: FAMILY MEDICINE

## 2023-08-21 PROCEDURE — 4010F ACE/ARB THERAPY RXD/TAKEN: CPT | Mod: CPTII,S$GLB,, | Performed by: FAMILY MEDICINE

## 2023-08-21 RX ORDER — PROMETHAZINE HYDROCHLORIDE AND DEXTROMETHORPHAN HYDROBROMIDE 6.25; 15 MG/5ML; MG/5ML
SYRUP ORAL
COMMUNITY
Start: 2023-06-22 | End: 2023-12-04

## 2023-08-21 RX ORDER — PREDNISONE 20 MG/1
20 TABLET ORAL 2 TIMES DAILY
COMMUNITY
Start: 2023-06-22 | End: 2023-12-04 | Stop reason: ALTCHOICE

## 2023-08-21 RX ORDER — POTASSIUM CHLORIDE 20 MEQ/1
20 TABLET, EXTENDED RELEASE ORAL
COMMUNITY
Start: 2023-06-01 | End: 2023-09-20 | Stop reason: SDUPTHER

## 2023-09-05 ENCOUNTER — LAB VISIT (OUTPATIENT)
Dept: LAB | Facility: HOSPITAL | Age: 58
End: 2023-09-05
Attending: FAMILY MEDICINE
Payer: MEDICARE

## 2023-09-05 ENCOUNTER — OFFICE VISIT (OUTPATIENT)
Dept: CARDIOLOGY | Facility: CLINIC | Age: 58
End: 2023-09-05
Payer: MEDICARE

## 2023-09-05 VITALS
RESPIRATION RATE: 15 BRPM | HEART RATE: 74 BPM | SYSTOLIC BLOOD PRESSURE: 126 MMHG | WEIGHT: 265.44 LBS | BODY MASS INDEX: 41.66 KG/M2 | OXYGEN SATURATION: 95 % | DIASTOLIC BLOOD PRESSURE: 74 MMHG | HEIGHT: 67 IN

## 2023-09-05 DIAGNOSIS — F41.9 ANXIETY: ICD-10-CM

## 2023-09-05 DIAGNOSIS — E66.01 CLASS 3 SEVERE OBESITY DUE TO EXCESS CALORIES WITH SERIOUS COMORBIDITY AND BODY MASS INDEX (BMI) OF 40.0 TO 44.9 IN ADULT: ICD-10-CM

## 2023-09-05 DIAGNOSIS — F41.1 GAD (GENERALIZED ANXIETY DISORDER): ICD-10-CM

## 2023-09-05 DIAGNOSIS — Z01.818 PREOPERATIVE EXAMINATION: ICD-10-CM

## 2023-09-05 DIAGNOSIS — R79.1 ABNORMAL COAGULATION PROFILE: ICD-10-CM

## 2023-09-05 DIAGNOSIS — E86.0 DEHYDRATION: ICD-10-CM

## 2023-09-05 DIAGNOSIS — R94.5 ABNORMAL RESULTS OF LIVER FUNCTION STUDIES: ICD-10-CM

## 2023-09-05 DIAGNOSIS — E78.2 HYPERLIPIDEMIA, MIXED: ICD-10-CM

## 2023-09-05 DIAGNOSIS — N40.0 BENIGN PROSTATIC HYPERPLASIA, UNSPECIFIED WHETHER LOWER URINARY TRACT SYMPTOMS PRESENT: ICD-10-CM

## 2023-09-05 DIAGNOSIS — I10 HYPERTENSION, UNSPECIFIED TYPE: Primary | ICD-10-CM

## 2023-09-05 LAB
ALBUMIN SERPL BCP-MCNC: 3.4 G/DL (ref 3.5–5.2)
ALP SERPL-CCNC: 90 U/L (ref 55–135)
ALT SERPL W/O P-5'-P-CCNC: 16 U/L (ref 10–44)
ANION GAP SERPL CALC-SCNC: 8 MMOL/L (ref 8–16)
APTT PPP: 26.8 SEC (ref 21–32)
AST SERPL-CCNC: 18 U/L (ref 10–40)
BASOPHILS # BLD AUTO: 0.02 K/UL (ref 0–0.2)
BASOPHILS NFR BLD: 0.3 % (ref 0–1.9)
BILIRUB SERPL-MCNC: 0.8 MG/DL (ref 0.1–1)
BUN SERPL-MCNC: 15 MG/DL (ref 6–20)
CALCIUM SERPL-MCNC: 9.4 MG/DL (ref 8.7–10.5)
CHLORIDE SERPL-SCNC: 103 MMOL/L (ref 95–110)
CO2 SERPL-SCNC: 29 MMOL/L (ref 23–29)
CREAT SERPL-MCNC: 0.9 MG/DL (ref 0.5–1.4)
DIFFERENTIAL METHOD: ABNORMAL
EOSINOPHIL # BLD AUTO: 0.1 K/UL (ref 0–0.5)
EOSINOPHIL NFR BLD: 1.8 % (ref 0–8)
ERYTHROCYTE [DISTWIDTH] IN BLOOD BY AUTOMATED COUNT: 13.5 % (ref 11.5–14.5)
EST. GFR  (NO RACE VARIABLE): >60 ML/MIN/1.73 M^2
GLUCOSE SERPL-MCNC: 92 MG/DL (ref 70–110)
HCT VFR BLD AUTO: 37.8 % (ref 40–54)
HGB BLD-MCNC: 12.5 G/DL (ref 14–18)
IMM GRANULOCYTES # BLD AUTO: 0.02 K/UL (ref 0–0.04)
IMM GRANULOCYTES NFR BLD AUTO: 0.3 % (ref 0–0.5)
INR PPP: 1.1 (ref 0.8–1.2)
LYMPHOCYTES # BLD AUTO: 1.1 K/UL (ref 1–4.8)
LYMPHOCYTES NFR BLD: 16.7 % (ref 18–48)
MCH RBC QN AUTO: 27.1 PG (ref 27–31)
MCHC RBC AUTO-ENTMCNC: 33.1 G/DL (ref 32–36)
MCV RBC AUTO: 82 FL (ref 82–98)
MONOCYTES # BLD AUTO: 0.6 K/UL (ref 0.3–1)
MONOCYTES NFR BLD: 9.6 % (ref 4–15)
NEUTROPHILS # BLD AUTO: 4.7 K/UL (ref 1.8–7.7)
NEUTROPHILS NFR BLD: 71.3 % (ref 38–73)
NRBC BLD-RTO: 0 /100 WBC
PLATELET # BLD AUTO: 276 K/UL (ref 150–450)
PMV BLD AUTO: 10 FL (ref 9.2–12.9)
POTASSIUM SERPL-SCNC: 2.9 MMOL/L (ref 3.5–5.1)
PROT SERPL-MCNC: 7.5 G/DL (ref 6–8.4)
PROTHROMBIN TIME: 11.3 SEC (ref 9–12.5)
RBC # BLD AUTO: 4.62 M/UL (ref 4.6–6.2)
SODIUM SERPL-SCNC: 140 MMOL/L (ref 136–145)
WBC # BLD AUTO: 6.54 K/UL (ref 3.9–12.7)

## 2023-09-05 PROCEDURE — 99214 PR OFFICE/OUTPT VISIT, EST, LEVL IV, 30-39 MIN: ICD-10-PCS | Mod: S$GLB,,, | Performed by: INTERNAL MEDICINE

## 2023-09-05 PROCEDURE — 99999 PR PBB SHADOW E&M-EST. PATIENT-LVL V: CPT | Mod: PBBFAC,,, | Performed by: INTERNAL MEDICINE

## 2023-09-05 PROCEDURE — 1160F PR REVIEW ALL MEDS BY PRESCRIBER/CLIN PHARMACIST DOCUMENTED: ICD-10-PCS | Mod: CPTII,S$GLB,, | Performed by: INTERNAL MEDICINE

## 2023-09-05 PROCEDURE — 3074F SYST BP LT 130 MM HG: CPT | Mod: CPTII,S$GLB,, | Performed by: INTERNAL MEDICINE

## 2023-09-05 PROCEDURE — 3078F PR MOST RECENT DIASTOLIC BLOOD PRESSURE < 80 MM HG: ICD-10-PCS | Mod: CPTII,S$GLB,, | Performed by: INTERNAL MEDICINE

## 2023-09-05 PROCEDURE — 3008F BODY MASS INDEX DOCD: CPT | Mod: CPTII,S$GLB,, | Performed by: INTERNAL MEDICINE

## 2023-09-05 PROCEDURE — 99999 PR PBB SHADOW E&M-EST. PATIENT-LVL V: ICD-10-PCS | Mod: PBBFAC,,, | Performed by: INTERNAL MEDICINE

## 2023-09-05 PROCEDURE — 3074F PR MOST RECENT SYSTOLIC BLOOD PRESSURE < 130 MM HG: ICD-10-PCS | Mod: CPTII,S$GLB,, | Performed by: INTERNAL MEDICINE

## 2023-09-05 PROCEDURE — 4010F PR ACE/ARB THEARPY RXD/TAKEN: ICD-10-PCS | Mod: CPTII,S$GLB,, | Performed by: INTERNAL MEDICINE

## 2023-09-05 PROCEDURE — 99214 OFFICE O/P EST MOD 30 MIN: CPT | Mod: S$GLB,,, | Performed by: INTERNAL MEDICINE

## 2023-09-05 PROCEDURE — 4010F ACE/ARB THERAPY RXD/TAKEN: CPT | Mod: CPTII,S$GLB,, | Performed by: INTERNAL MEDICINE

## 2023-09-05 PROCEDURE — 3078F DIAST BP <80 MM HG: CPT | Mod: CPTII,S$GLB,, | Performed by: INTERNAL MEDICINE

## 2023-09-05 PROCEDURE — 85610 PROTHROMBIN TIME: CPT | Performed by: FAMILY MEDICINE

## 2023-09-05 PROCEDURE — 1159F MED LIST DOCD IN RCRD: CPT | Mod: CPTII,S$GLB,, | Performed by: INTERNAL MEDICINE

## 2023-09-05 PROCEDURE — 80053 COMPREHEN METABOLIC PANEL: CPT | Performed by: FAMILY MEDICINE

## 2023-09-05 PROCEDURE — 85025 COMPLETE CBC W/AUTO DIFF WBC: CPT | Performed by: FAMILY MEDICINE

## 2023-09-05 PROCEDURE — 3044F HG A1C LEVEL LT 7.0%: CPT | Mod: CPTII,S$GLB,, | Performed by: INTERNAL MEDICINE

## 2023-09-05 PROCEDURE — 3044F PR MOST RECENT HEMOGLOBIN A1C LEVEL <7.0%: ICD-10-PCS | Mod: CPTII,S$GLB,, | Performed by: INTERNAL MEDICINE

## 2023-09-05 PROCEDURE — 85730 THROMBOPLASTIN TIME PARTIAL: CPT | Performed by: FAMILY MEDICINE

## 2023-09-05 PROCEDURE — 1159F PR MEDICATION LIST DOCUMENTED IN MEDICAL RECORD: ICD-10-PCS | Mod: CPTII,S$GLB,, | Performed by: INTERNAL MEDICINE

## 2023-09-05 PROCEDURE — 93000 EKG 12-LEAD: ICD-10-PCS | Mod: S$GLB,,, | Performed by: INTERNAL MEDICINE

## 2023-09-05 PROCEDURE — 93000 ELECTROCARDIOGRAM COMPLETE: CPT | Mod: S$GLB,,, | Performed by: INTERNAL MEDICINE

## 2023-09-05 PROCEDURE — 3008F PR BODY MASS INDEX (BMI) DOCUMENTED: ICD-10-PCS | Mod: CPTII,S$GLB,, | Performed by: INTERNAL MEDICINE

## 2023-09-05 PROCEDURE — 36415 COLL VENOUS BLD VENIPUNCTURE: CPT | Performed by: FAMILY MEDICINE

## 2023-09-05 PROCEDURE — 1160F RVW MEDS BY RX/DR IN RCRD: CPT | Mod: CPTII,S$GLB,, | Performed by: INTERNAL MEDICINE

## 2023-09-05 NOTE — PROGRESS NOTES
CARDIOVASCULAR CONSULTATION    REASON FOR CONSULT:   Randolph Blue is a 58 y.o. male who presents for evaluation    HISTORY OF PRESENT ILLNESS:     Patient is a pleasant 57-year-old man.  Has had a few episodes of chest pains.  Went to ER yesterday.  Has been referred here for further evaluation.  Chest pain was right-sided.  No particular aggravating or relieving factors.  States that he is had similar chest pain in the past also.  Denies any orthopnea, PND, swelling of feet.    Notes from September 23:  Patient here for follow-up.  Denies any chest pains at rest on exertion, orthopnea, PND.  Stress echo was negative for ischemia in the echo portion but positive of ischemia in the EKG portion.  Patient is chest pain-free.  On exercise did not have any chest pains.  States that his shoulder pain is lifestyle limiting and he really needs to go for arthroscopic surgery at [facility] and cannot wait for that       PAST MEDICAL HISTORY:     Past Medical History:   Diagnosis Date    Arthritis     Bronchitis     Cervical arthritis     SILVIA (generalized anxiety disorder) 01/03/2014    GERD (gastroesophageal reflux disease)     Glaucoma     Hyperlipidemia, mixed 04/03/2014    Hypertension        PAST SURGICAL HISTORY:     Past Surgical History:   Procedure Laterality Date    KNEE SURGERY      bilateral    MANDIBLE FRACTURE SURGERY      PROSTATE SURGERY      SHOULDER SURGERY      [place] at [facility].        ALLERGIES AND MEDICATION:     Review of patient's allergies indicates:   Allergen Reactions    Percocet [oxycodone-acetaminophen]      Other reaction(s): Rash        Medication List            Accurate as of September 5, 2023  2:33 PM. If you have any questions, ask your nurse or doctor.                CONTINUE taking these medications      albuterol 90 mcg/actuation inhaler  Commonly known as: PROVENTIL/VENTOLIN HFA  Inhale 1-2 puffs into the lungs every 4 (four) hours as needed for Wheezing.     amitriptyline 100 MG  tablet  Commonly known as: ELAVIL     amoxicillin 875 MG tablet  Commonly known as: AMOXIL     C-TUB Misc  Generic drug: miscellaneous medical supply     CENTRUM MEN 50 PLUS MINIS ORAL     chlorhexidine 0.12 % solution  Commonly known as: PERIDEX     FLUoxetine 40 MG capsule     hydroCHLOROthiazide 25 MG tablet  Commonly known as: HYDRODIURIL  Take 1 tablet (25 mg total) by mouth once daily.     ibuprofen 800 MG tablet  Commonly known as: ADVIL,MOTRIN     latanoprost 0.005 % ophthalmic solution     * LUPRON DEPOT (6 MONTH) 45 mg Sykt injection  Generic drug: leuprolide acetate (6 month)     * leuprolide acetate (6 month) 45 mg injection  Commonly known as: ELIGARD     * leuprolide acetate (6 month) 45 mg injection  Commonly known as: ELIGARD     losartan 50 MG tablet  Commonly known as: COZAAR  Take 1 tablet (50 mg total) by mouth once daily.     montelukast 10 mg tablet  Commonly known as: SINGULAIR  Take 1 tablet (10 mg total) by mouth every evening.     potassium chloride SA 20 MEQ tablet  Commonly known as: K-DURKLOR-CON     predniSONE 20 MG tablet  Commonly known as: DELTASONE     promethazine-dextromethorphan 6.25-15 mg/5 mL Syrp  Commonly known as: PROMETHAZINE-DM     rosuvastatin 10 MG tablet  Commonly known as: CRESTOR  Take 1 tablet (10 mg total) by mouth every evening.           * This list has 3 medication(s) that are the same as other medications prescribed for you. Read the directions carefully, and ask your doctor or other care provider to review them with you.                  SOCIAL HISTORY:     Social History     Socioeconomic History    Marital status: Single   Tobacco Use    Smoking status: Never    Smokeless tobacco: Never   Substance and Sexual Activity    Alcohol use: Not Currently     Alcohol/week: 3.3 - 4.2 standard drinks of alcohol     Types: 4 - 5 Standard drinks or equivalent per week     Comment: No alcohol x 2 weeks    Drug use: No    Sexual activity: Not Currently       FAMILY  "HISTORY:     Family History   Problem Relation Age of Onset    Cancer Mother     Hypertension Mother     Heart disease Father     Hypertension Maternal Uncle     Hypertension Paternal Uncle     Hypertension Maternal Grandmother     Heart disease Paternal Grandmother     Hypertension Paternal Grandmother        REVIEW OF SYSTEMS:   Review of Systems   Constitutional: Negative.   HENT: Negative.     Eyes: Negative.    Respiratory: Negative.     Endocrine: Negative.    Hematologic/Lymphatic: Negative.    Skin: Negative.    Musculoskeletal: Negative.    Gastrointestinal: Negative.    Genitourinary: Negative.    Neurological: Negative.    Psychiatric/Behavioral: Negative.     Allergic/Immunologic: Negative.        A 10 point review of systems was performed and all the pertinent positives have been mentioned. Rest of review of systems was negative.        PHYSICAL EXAM:     Vitals:    09/05/23 1410   BP: 126/74   Pulse: 74   Resp: 15    Body mass index is 41.57 kg/m².  Weight: 120.4 kg (265 lb 6.9 oz)   Height: 5' 7" (170.2 cm)     Physical Exam  Vitals reviewed.   Constitutional:       Appearance: He is well-developed.   HENT:      Head: Normocephalic.   Eyes:      Conjunctiva/sclera: Conjunctivae normal.      Pupils: Pupils are equal, round, and reactive to light.   Cardiovascular:      Rate and Rhythm: Normal rate and regular rhythm.      Heart sounds: Normal heart sounds.   Pulmonary:      Effort: Pulmonary effort is normal.      Breath sounds: Normal breath sounds.   Abdominal:      General: Bowel sounds are normal.      Palpations: Abdomen is soft.   Musculoskeletal:      Cervical back: Normal range of motion and neck supple.   Skin:     General: Skin is warm.   Neurological:      Mental Status: He is alert and oriented to person, place, and time.           DATA:     Laboratory:  CBC:  Recent Labs   Lab 02/01/23  1635 05/31/23  0735 09/05/23  1345   WBC 7.9 5.20 6.54   Hemoglobin 12.9 L 13.0 L 12.5 L   Hematocrit " 38.8 L 40.6 37.8 L   Platelets  --  309 276         CHEMISTRIES:  Recent Labs   Lab 11/04/22  1305 11/30/22  1613 02/01/23  1635 05/03/23  1610 05/31/23  0735 09/05/23  1345   Glucose  --  102  --   --  102 92   Sodium 138 140 140 140 139 140   Potassium 3.7 3.8 3.8 3.7 3.1 L 2.9 L   BUN 22.0 18 19.0 18.0 14 15   Creatinine 0.98 0.9 0.96 0.98 0.8 0.9   eGFR  89 L  --  91 89 L  --   --    Calcium 9.6 10.0 9.9 10.2 9.8 9.4         CARDIAC BIOMARKERS:        COAGS:  Recent Labs   Lab 06/15/23  1034 09/05/23  1345   INR 1.5 H 1.1         LIPIDS/LFTS:  Recent Labs   Lab 05/31/22  1604 10/26/22  1501 05/03/23  1610 05/31/23  0735 09/05/23  1345   Cholesterol 169  --   --  156  --    Triglycerides 145  --   --  139  --    HDL 44  --   --  38 L  --    LDL Cholesterol 96.0  --   --  90.2  --    Non-HDL Cholesterol 125  --   --  118  --    AST  --    < > 18 19 18   ALT  --    < > 15 17 16    < > = values in this interval not displayed.         Hemoglobin A1C   Date Value Ref Range Status   05/31/2023 5.8 (H) 4.0 - 5.6 % Final     Comment:     ADA Screening Guidelines:  5.7-6.4%  Consistent with prediabetes  >or=6.5%  Consistent with diabetes    High levels of fetal hemoglobin interfere with the HbA1C  assay. Heterozygous hemoglobin variants (HbS, HgC, etc)do  not significantly interfere with this assay.   However, presence of multiple variants may affect accuracy.     11/30/2022 5.9 (H) 4.0 - 5.6 % Final     Comment:     ADA Screening Guidelines:  5.7-6.4%  Consistent with prediabetes  >or=6.5%  Consistent with diabetes    High levels of fetal hemoglobin interfere with the HbA1C  assay. Heterozygous hemoglobin variants (HbS, HgC, etc)do  not significantly interfere with this assay.   However, presence of multiple variants may affect accuracy.     05/31/2022 6.0 (H) 4.0 - 5.6 % Final     Comment:     ADA Screening Guidelines:  5.7-6.4%  Consistent with prediabetes  >or=6.5%  Consistent with diabetes    High  "levels of fetal hemoglobin interfere with the HbA1C  assay. Heterozygous hemoglobin variants (HbS, HgC, etc)do  not significantly interfere with this assay.   However, presence of multiple variants may affect accuracy.         TSH  Recent Labs   Lab 06/22/23  0740   TSH 2.576         The 10-year ASCVD risk score (Ashvin LOPEZ, et al., 2019) is: 13.1%    Values used to calculate the score:      Age: 58 years      Sex: Male      Is Non- : Yes      Diabetic: No      Tobacco smoker: No      Systolic Blood Pressure: 126 mmHg      Is BP treated: Yes      HDL Cholesterol: 38 mg/dL      Total Cholesterol: 156 mg/dL       BNP    No results found for: "BNP"          ASSESSMENT AND PLAN     Patient Active Problem List   Diagnosis    BPH (benign prostatic hyperplasia)    Anxiety    SILVIA (generalized anxiety disorder)    Hyperlipidemia, mixed    Acute renal failure syndrome    Dehydration    Class 3 severe obesity due to excess calories with serious comorbidity and body mass index (BMI) of 40.0 to 44.9 in adult     Further evaluation with the help of stress echocardiogram was done.  The EKG portion was positive for ischemia, however the stress echo portion was negative for ischemia.  Patient did not have any chest pains during exercise.  States that his arm pain is lifestyle limiting and his surgery scheduled for September.  Patient at moderate risk for coronary ischemia during atherosclerotic surgery of his shoulder    Follow-up after 4 m          Thank you very much for involving me in the care of your patient.  Please do not hesitate to contact me if there are any questions.      Marilu Brady MD, FACC, [facility]  Interventional Cardiologist, [facility].           This note was dictated with the help of speech recognition software.  There might be un-intended errors and/or substitutions.                "

## 2023-09-08 VITALS
HEIGHT: 67 IN | BODY MASS INDEX: 41.18 KG/M2 | TEMPERATURE: 98 F | DIASTOLIC BLOOD PRESSURE: 72 MMHG | OXYGEN SATURATION: 98 % | HEART RATE: 82 BPM | WEIGHT: 262.38 LBS | SYSTOLIC BLOOD PRESSURE: 132 MMHG

## 2023-09-08 NOTE — PROGRESS NOTES
"  Physical Exam  /72   Pulse 82   Temp 98.1 °F (36.7 °C)   Ht 5' 7" (1.702 m)   Wt 119 kg (262 lb 5.6 oz)   SpO2 98%   BMI 41.09 kg/m²      Office Visit    Patient Name: Randolph Blue    : 1965  MRN: [id number]      Assessment/Plan:  Randolph Blue is a 58 y.o. male who presents today for :    Preoperative examination  -     CBC Auto Differential; Future; Expected date: 2023  -     Comprehensive Metabolic Panel; Future; Expected date: 2023  -     APTT; Future; Expected date: 2023  -     PROTIME-INR; Future; Expected date: 2023  -     X-Ray Chest PA And Lateral; Future; Expected date: 2023  -     EKG 12-lead  Chest Pain  Class 3 severe obesity due to excess calories with serious comorbidity and body mass index (BMI) of 40.0 to 44.9 in adult  Hyperlipidemia, mixed  BPH  SILVIA (generalized anxiety disorder)    Patient is optimized for surgical procedure from primary care standpoint and is at moderate   cardiac risk. - Patient has functional capacity of >4 METs. Continue current medication regimen. Advised healthy diet/exercise/weight loss in anticipation of surgery.   He does have a follow up appointment with his Cardiologist in 2 weeks for cardiac clearance, will defer to cardiology for any additional ncardiac testing that may be necessary from their standpoint, pt advised to keep that appointment.            FAX [phone] dr. Deondre Acosta          F/u PRN        This note was created by combination of typed  and MModal dictation.  Transcription errors may be present.  If there are any questions, please contact me.        ----------------------------------------------------------------------------------------------------------------------      HPI:  Patient Care Team:  Cee Solo DO as PCP - General (Family Medicine)  Deondre Salazar MD as Consulting Physician (Orthopedic Surgery)    Randolph is a 58 y.o. male with      Patient Active Problem List "   Diagnosis    BPH (benign prostatic hyperplasia)    Anxiety    SILVIA (generalized anxiety disorder)    Hyperlipidemia, mixed    Acute renal failure syndrome    Dehydration    Class 3 severe obesity due to excess calories with serious comorbidity and body mass index (BMI) of 40.0 to 44.9 in adult     This patient is new to me      [place] presents today for:  Pre-op Exam    Procedure to be performed: R shoulder arthroscopy in about 4 weeks.     He has had prior shoulder surgery without any perioperative complications. Patient is not a smoker. He does endorse having some episodic right-sided chest pain in the past but denies any SOB/GOODMAN/palpitations/PND/feet swelling/syncope. He is able to climb a flight of stairs without CP/SOB. He had an abnormal EKG that was done two months ago with Cardiology. His recent Stress echo was negative for ischemia but was positive of ischemia in the EKG portion. His blood pressure is acceptable today. He has upcoming follow up appointment with cardiology for cardiac clearance. He is otherwise doing well overall and has no acute complaints today.        Additional ROS    No F/C/wt changes/fatigue  No dysphagia/sore throat/rhinorrhea  No SOB/palpitations/swelling  No cough/wheezing/SOB  No nausea/vomiting/abd pain  No muscle aches, +joint pain   No rashes  No MSK weakness/HA/tingling/numbness  No anxiety/depression  No polyuria/polydipsia/fatigue  No bleeding/bruising        Patient Active Problem List   Diagnosis    BPH (benign prostatic hyperplasia)    Anxiety    SILVIA (generalized anxiety disorder)    Hyperlipidemia, mixed    Acute renal failure syndrome    Dehydration    Class 3 severe obesity due to excess calories with serious comorbidity and body mass index (BMI) of 40.0 to 44.9 in adult       Current Medications  Medications reviewed and updated.       Current Outpatient Medications:     albuterol (PROVENTIL/VENTOLIN HFA) 90 mcg/actuation inhaler, Inhale 1-2 puffs into the lungs every  4 (four) hours as needed for Wheezing., Disp: 18 g, Rfl: 11    amitriptyline (ELAVIL) 100 MG tablet, Take by mouth., Disp: , Rfl:     amoxicillin (AMOXIL) 875 MG tablet, TAKE 1 TABLET BY MOUTH EVERY 12 HOURS UNTIL GONE, Disp: , Rfl:     chlorhexidine (PERIDEX) 0.12 % solution, , Disp: , Rfl:     FLUoxetine 40 MG capsule, Take 40 mg by mouth once daily., Disp: , Rfl:     hydroCHLOROthiazide (HYDRODIURIL) 25 MG tablet, Take 1 tablet (25 mg total) by mouth once daily., Disp: 90 tablet, Rfl: 3    ibuprofen (ADVIL,MOTRIN) 800 MG tablet, TAKE 1 TABLET BY MOUTH EVERY 6 HOURS AS NEEDED FOR PAIN FOR 3 DAYS AFTER SURGERY . DO NOT TAKE ON AN EMPTY STOMACH, Disp: , Rfl:     latanoprost 0.005 % ophthalmic solution, , Disp: , Rfl:     leuprolide acetate, 6 month, (ELIGARD) 45 mg injection, Inject 45 mg into the skin every 6 (six) months., Disp: , Rfl:     leuprolide, 6 month, (ELIGARD) 45 mg injection, Inject 45 mg into the skin., Disp: , Rfl:     losartan (COZAAR) 50 MG tablet, Take 1 tablet (50 mg total) by mouth once daily., Disp: 90 tablet, Rfl: 3    LUPRON DEPOT, 6 MONTH, 45 mg SyKt injection, Inject into the muscle., Disp: , Rfl:     miscellaneous medical supply (C-TUB) Misc, vacuum erection device, Disp: , Rfl:     montelukast (SINGULAIR) 10 mg tablet, Take 1 tablet (10 mg total) by mouth every evening., Disp: 30 tablet, Rfl: 11    mv-min/folic/K1/lycopen/lutein (CENTRUM MEN 50 PLUS MINIS ORAL), Take by mouth., Disp: , Rfl:     potassium chloride SA (K-DUR,KLOR-CON) 20 MEQ tablet, Take 20 mEq by mouth., Disp: , Rfl:     predniSONE (DELTASONE) 20 MG tablet, Take 20 mg by mouth 2 (two) times daily., Disp: , Rfl:     promethazine-dextromethorphan (PROMETHAZINE-DM) 6.25-15 mg/5 mL Syrp, TAKE 5 ML BY MOUTH EVERY 4 TO 6 HOURS AS NEEDED FOR COUGH, Disp: , Rfl:     rosuvastatin (CRESTOR) 10 MG tablet, Take 1 tablet (10 mg total) by mouth every evening., Disp: 90 tablet, Rfl: 3    Past Surgical History:   Procedure Laterality Date  "   KNEE SURGERY      bilateral    MANDIBLE FRACTURE SURGERY      PROSTATE SURGERY      SHOULDER SURGERY      [place] at [facility].        Family History   Problem Relation Age of Onset    Cancer Mother     Hypertension Mother     Heart disease Father     Hypertension Maternal Uncle     Hypertension Paternal Uncle     Hypertension Maternal Grandmother     Heart disease Paternal Grandmother     Hypertension Paternal Grandmother        Social History     Socioeconomic History    Marital status: Single   Tobacco Use    Smoking status: Never    Smokeless tobacco: Never   Substance and Sexual Activity    Alcohol use: Not Currently     Alcohol/week: 3.3 - 4.2 standard drinks of alcohol     Types: 4 - 5 Standard drinks or equivalent per week     Comment: No alcohol x 2 weeks    Drug use: No    Sexual activity: Not Currently           Allergies   Review of patient's allergies indicates:   Allergen Reactions    Percocet [oxycodone-acetaminophen]      Other reaction(s): Rash             Review of Systems  See HPI      [unfilled]  /72   Pulse 82   Temp 98.1 °F (36.7 °C)   Ht 5' 7" (1.702 m)   Wt 119 kg (262 lb 5.6 oz)   SpO2 98%   BMI 41.09 kg/m²     GEN: NAD, well developed, pleasant, well nourished  HEENT: NCAT, PERRLA, EOMI, sclera clear, anicteric, O/P clear, MMM with no lesions  NECK: normal, supple with midline trachea, no LAD, no thyromegaly  LUNGS: CTAB, no w/r/r, no increased work of breathing   HEART: RRR, normal S1 and S2, no m/r/g, no edema  ABD: s/nt/nd, NABS  SKIN: normal turgor, no rashes  PSYCH: AOx3, appropriate mood and affect  MSK: warm/well perfused, normal ROM in all extremities, no c/c/e.      Labs  Lab Results   Component Value Date    HGBA1C 5.8 (H) 05/31/2023     Lab Results   Component Value Date     09/05/2023    K 2.9 (L) 09/05/2023     09/05/2023    CO2 29 09/05/2023    BUN 15 09/05/2023    CREATININE 0.9 09/05/2023    CALCIUM 9.4 09/05/2023    ANIONGAP 8 09/05/2023 "    ESTGFRAFRICA 89 (L) 05/03/2023    EGFRNONAA >60.0 08/31/2015     Lab Results   Component Value Date    CHOL 156 05/31/2023    CHOL 169 05/31/2022    CHOL 171 08/13/2015     Lab Results   Component Value Date    HDL 38 (L) 05/31/2023    HDL 44 05/31/2022    HDL 49 08/13/2015     Lab Results   Component Value Date    LDLCALC 90.2 05/31/2023    LDLCALC 96.0 05/31/2022    LDLCALC 96.4 08/13/2015     Lab Results   Component Value Date    TRIG 139 05/31/2023    TRIG 145 05/31/2022    TRIG 128 08/13/2015     Lab Results   Component Value Date    CHOLHDL 24.4 05/31/2023    CHOLHDL 26.0 05/31/2022    CHOLHDL 28.7 08/13/2015     Last set of blood work has been reviewed as noted above.

## 2023-09-20 RX ORDER — POTASSIUM CHLORIDE 20 MEQ/1
20 TABLET, EXTENDED RELEASE ORAL DAILY
Qty: 90 TABLET | Refills: 3 | Status: ON HOLD | OUTPATIENT
Start: 2023-09-20 | End: 2024-04-02 | Stop reason: HOSPADM

## 2023-09-20 NOTE — TELEPHONE ENCOUNTER
No care due was identified.  [facility] Embedded Care Due Messages. Reference number: [id number].   9/20/2023 9:25:09 AM CDT

## 2023-09-20 NOTE — TELEPHONE ENCOUNTER
----- Message from Alida Hilton sent at 9/20/2023  8:55 AM CDT -----  Type: RX Refill Request    Who Called: pt     Have you contacted your pharmacy:    Refill or New Rx:potassium chloride SA (K-DUR,KLOR-CON) 20 MEQ tablet    RX Name and Strength:    How is the patient currently taking it? (ex. 1XDay):    Is this a 30 day or 90 day RX:    [facility] [address] [place], [place] [address] - [address] [place] [address]  [address] [place] [address]  Phone: [phone] Fax: [phone]        Local or Mail Order:    Ordering Provider:    Would the patient rather a call back or a response via My [facility]? call    Best Call Back Number:[phone] (home)       Additional Information:

## 2023-09-21 ENCOUNTER — OFFICE VISIT (OUTPATIENT)
Dept: FAMILY MEDICINE | Facility: CLINIC | Age: 58
End: 2023-09-21
Payer: MEDICARE

## 2023-09-21 ENCOUNTER — HOSPITAL ENCOUNTER (OUTPATIENT)
Dept: RADIOLOGY | Facility: HOSPITAL | Age: 58
Discharge: HOME | End: 2023-09-21
Attending: FAMILY MEDICINE
Payer: MEDICARE

## 2023-09-21 VITALS
OXYGEN SATURATION: 96 % | WEIGHT: 275 LBS | TEMPERATURE: 99 F | DIASTOLIC BLOOD PRESSURE: 80 MMHG | HEART RATE: 91 BPM | BODY MASS INDEX: 43.16 KG/M2 | SYSTOLIC BLOOD PRESSURE: 126 MMHG | HEIGHT: 67 IN

## 2023-09-21 DIAGNOSIS — Z98.890 S/P ROTATOR CUFF SURGERY: ICD-10-CM

## 2023-09-21 DIAGNOSIS — R05.9 COUGH, UNSPECIFIED TYPE: Primary | ICD-10-CM

## 2023-09-21 DIAGNOSIS — E66.01 CLASS 3 SEVERE OBESITY DUE TO EXCESS CALORIES WITH SERIOUS COMORBIDITY AND BODY MASS INDEX (BMI) OF 40.0 TO 44.9 IN ADULT: ICD-10-CM

## 2023-09-21 DIAGNOSIS — Z01.818 PREOPERATIVE EXAMINATION: ICD-10-CM

## 2023-09-21 PROCEDURE — 3008F BODY MASS INDEX DOCD: CPT | Mod: CPTII,S$GLB,,

## 2023-09-21 PROCEDURE — 1159F MED LIST DOCD IN RCRD: CPT | Mod: CPTII,S$GLB,,

## 2023-09-21 PROCEDURE — 3044F PR MOST RECENT HEMOGLOBIN A1C LEVEL <7.0%: ICD-10-PCS | Mod: CPTII,S$GLB,,

## 2023-09-21 PROCEDURE — 99999 PR PBB SHADOW E&M-EST. PATIENT-LVL IV: CPT | Mod: PBBFAC,,,

## 2023-09-21 PROCEDURE — 3008F PR BODY MASS INDEX (BMI) DOCUMENTED: ICD-10-PCS | Mod: CPTII,S$GLB,,

## 2023-09-21 PROCEDURE — 3079F PR MOST RECENT DIASTOLIC BLOOD PRESSURE 80-89 MM HG: ICD-10-PCS | Mod: CPTII,S$GLB,,

## 2023-09-21 PROCEDURE — 99213 OFFICE O/P EST LOW 20 MIN: CPT | Mod: S$GLB,,,

## 2023-09-21 PROCEDURE — 3079F DIAST BP 80-89 MM HG: CPT | Mod: CPTII,S$GLB,,

## 2023-09-21 PROCEDURE — 99999 PR PBB SHADOW E&M-EST. PATIENT-LVL IV: ICD-10-PCS | Mod: PBBFAC,,,

## 2023-09-21 PROCEDURE — 4010F ACE/ARB THERAPY RXD/TAKEN: CPT | Mod: CPTII,S$GLB,,

## 2023-09-21 PROCEDURE — 1159F PR MEDICATION LIST DOCUMENTED IN MEDICAL RECORD: ICD-10-PCS | Mod: CPTII,S$GLB,,

## 2023-09-21 PROCEDURE — 3074F PR MOST RECENT SYSTOLIC BLOOD PRESSURE < 130 MM HG: ICD-10-PCS | Mod: CPTII,S$GLB,,

## 2023-09-21 PROCEDURE — 3044F HG A1C LEVEL LT 7.0%: CPT | Mod: CPTII,S$GLB,,

## 2023-09-21 PROCEDURE — 4010F PR ACE/ARB THEARPY RXD/TAKEN: ICD-10-PCS | Mod: CPTII,S$GLB,,

## 2023-09-21 PROCEDURE — 3074F SYST BP LT 130 MM HG: CPT | Mod: CPTII,S$GLB,,

## 2023-09-21 PROCEDURE — 99213 PR OFFICE/OUTPT VISIT, EST, LEVL III, 20-29 MIN: ICD-10-PCS | Mod: S$GLB,,,

## 2023-09-21 RX ORDER — OXYCODONE HYDROCHLORIDE 5 MG/1
5 CAPSULE ORAL EVERY 4 HOURS PRN
COMMUNITY

## 2023-09-21 RX ORDER — DOXYCYCLINE 100 MG/1
100 CAPSULE ORAL 2 TIMES DAILY
COMMUNITY
End: 2023-12-04

## 2023-09-21 RX ORDER — MELOXICAM 15 MG/1
15 TABLET ORAL DAILY
Status: ON HOLD | COMMUNITY
End: 2024-04-02 | Stop reason: HOSPADM

## 2023-09-21 NOTE — PROGRESS NOTES
HPI     Chief Complaint:  Cough    Randolph Blue is a 58 y.o. male with multiple medical diagnoses as listed in the medical history and problem list that presents for cough.  Pt is not known to me with his last appointment 8/21/2023.      Cough  This is a chronic problem. The current episode started more than 1 month ago (started two months ago). The cough is Productive of brown sputum. Associated symptoms include wheezing. Pertinent negatives include no chills, fever or shortness of breath. He has tried ipratropium inhaler for the symptoms. His past medical history is significant for bronchitis.     Assessment & Plan     Problem List Items Addressed This Visit          Endocrine    Class 3 severe obesity due to excess calories with serious comorbidity and body mass index (BMI) of 40.0 to 44.9 in adult  Continue healthy diet and exercise for weight loss.     Other Visit Diagnoses       Cough, unspecified type    -  Primary  Coughing for the last 2 months. Was seen 6/22 and prescribed Albuterol, Promethazine DM and Prednisone for asthma exacerbation. Will schedule chest x-ray that was previously ordered but not completed. Currently on Doxycycline post operatively, continue Doxy. Continue using Albuterol inhaler.    S/P rotator cuff surgery      Had left rotator cuff surgery 2 days ago with Dr. Lamas.              --------------------------------------------      Health Maintenance:  Health Maintenance         Date Due Completion Date    COVID-19 Vaccine (1) Never done ---    Pneumococcal Vaccines (Age 0-64) (1 - PCV) Never done ---    Shingles Vaccine (1 of 2) Never done ---    Influenza Vaccine (1) Never done ---    TETANUS VACCINE 06/01/2032 (Originally 6/23/1981) ---    Hemoglobin A1c (Prediabetes) 05/31/2024 5/31/2023    Colorectal Cancer Screening 04/10/2025 4/10/2015    Lipid Panel 05/31/2028 5/31/2023            Health maintenance reviewed Defer flu shot until feeling better.    Follow Up:  No follow-ups  "on file.    Exam     Review of Systems:  (as noted above)  Review of Systems   Constitutional:  Negative for chills and fever.   Respiratory:  Positive for cough and wheezing. Negative for shortness of breath.        Physical Exam:   Physical Exam  Constitutional:       General: He is not in acute distress.     Appearance: He is not ill-appearing or diaphoretic.   HENT:      Head: Normocephalic and atraumatic.   Eyes:      General: No scleral icterus.  Cardiovascular:      Rate and Rhythm: Normal rate and regular rhythm.      Pulses: Normal pulses.      Heart sounds: No murmur heard.     No friction rub. No gallop.   Pulmonary:      Effort: No respiratory distress.      Breath sounds: Normal breath sounds.      Comments: Audible wheezing at rest  Chest:      Chest wall: No tenderness.   Musculoskeletal:      Right shoulder: Normal.      Left shoulder: Tenderness present. Decreased range of motion. Decreased strength.      Cervical back: No rigidity.      Comments: Post op left rotator cuff surgery   Neurological:      Mental Status: He is alert and oriented to person, place, and time.       Vitals:    09/21/23 0858   BP: 126/80   Pulse: 91   Temp: 98.6 °F (37 °C)   TempSrc: Oral   SpO2: 96%   Weight: 124.7 kg (275 lb 0.4 oz)   Height: 5' 7" (1.702 m)      Body mass index is 43.07 kg/m².        History     Past Medical History:  Past Medical History:   Diagnosis Date    Arthritis     Bronchitis     Cervical arthritis     SILVIA (generalized anxiety disorder) 01/03/2014    GERD (gastroesophageal reflux disease)     Glaucoma     Hyperlipidemia, mixed 04/03/2014    Hypertension        Past Surgical History:  Past Surgical History:   Procedure Laterality Date    KNEE SURGERY      bilateral    MANDIBLE FRACTURE SURGERY      PROSTATE SURGERY      SHOULDER SURGERY      [place] at [facility].        Social History:  Social History     Socioeconomic History    Marital status: Single   Tobacco Use    Smoking status: Never "    Smokeless tobacco: Never   Substance and Sexual Activity    Alcohol use: Not Currently     Alcohol/week: 3.3 - 4.2 standard drinks of alcohol     Types: 4 - 5 Standard drinks or equivalent per week     Comment: No alcohol x 2 weeks    Drug use: No    Sexual activity: Not Currently       Family History:  Family History   Problem Relation Age of Onset    Cancer Mother     Hypertension Mother     Heart disease Father     Hypertension Maternal Uncle     Hypertension Paternal Uncle     Hypertension Maternal Grandmother     Heart disease Paternal Grandmother     Hypertension Paternal Grandmother        Allergies and Medications: (updated and reviewed)  Review of patient's allergies indicates:   Allergen Reactions    Percocet [oxycodone-acetaminophen]      Other reaction(s): Rash     Current Outpatient Medications   Medication Sig Dispense Refill    albuterol (PROVENTIL/VENTOLIN HFA) 90 mcg/actuation inhaler Inhale 1-2 puffs into the lungs every 4 (four) hours as needed for Wheezing. 18 g 11    amitriptyline (ELAVIL) 100 MG tablet Take by mouth.      amoxicillin (AMOXIL) 875 MG tablet TAKE 1 TABLET BY MOUTH EVERY 12 HOURS UNTIL GONE      chlorhexidine (PERIDEX) 0.12 % solution       doxycycline (MONODOX) 100 MG capsule Take 100 mg by mouth 2 (two) times daily.      FLUoxetine 40 MG capsule Take 40 mg by mouth once daily.      hydroCHLOROthiazide (HYDRODIURIL) 25 MG tablet Take 1 tablet (25 mg total) by mouth once daily. 90 tablet 3    ibuprofen (ADVIL,MOTRIN) 800 MG tablet TAKE 1 TABLET BY MOUTH EVERY 6 HOURS AS NEEDED FOR PAIN FOR 3 DAYS AFTER SURGERY . DO NOT TAKE ON AN EMPTY STOMACH      latanoprost 0.005 % ophthalmic solution       leuprolide acetate, 6 month, (ELIGARD) 45 mg injection Inject 45 mg into the skin every 6 (six) months.      leuprolide, 6 month, (ELIGARD) 45 mg injection Inject 45 mg into the skin.      losartan (COZAAR) 50 MG tablet Take 1 tablet (50 mg total) by mouth once daily. 90 tablet 3     LUPRON DEPOT, 6 MONTH, 45 mg SyKt injection Inject into the muscle.      meloxicam (MOBIC) 15 MG tablet Take 15 mg by mouth once daily.      miscellaneous medical supply (C-TUB) Misc vacuum erection device      montelukast (SINGULAIR) 10 mg tablet Take 1 tablet (10 mg total) by mouth every evening. 30 tablet 11    mv-min/folic/K1/lycopen/lutein (CENTRUM MEN 50 PLUS MINIS ORAL) Take by mouth.      oxyCODONE (OXY-IR) 5 mg Cap Take 5 mg by mouth every 4 (four) hours as needed for Pain.      potassium chloride SA (K-DUR,KLOR-CON) 20 MEQ tablet Take 1 tablet (20 mEq total) by mouth once daily. 90 tablet 3    predniSONE (DELTASONE) 20 MG tablet Take 20 mg by mouth 2 (two) times daily.      promethazine-dextromethorphan (PROMETHAZINE-DM) 6.25-15 mg/5 mL Syrp TAKE 5 ML BY MOUTH EVERY 4 TO 6 HOURS AS NEEDED FOR COUGH      rosuvastatin (CRESTOR) 10 MG tablet Take 1 tablet (10 mg total) by mouth every evening. 90 tablet 3     No current facility-administered medications for this visit.       Patient Care Team:  Cee Solo DO as PCP - General (Family Medicine)  Deondre Salazar MD as Consulting Physician (Orthopedic Surgery)         - The patient is given an After Visit Summary that lists all medications with directions, allergies, education, orders placed during this encounter and follow-up instructions.      - I have reviewed the patient's medical information including past medical, family, and social history sections including the medications and allergies.      - We discussed the patient's current medications.     This note was created by combination of typed  and MModal dictation.  Transcription errors may be present.  If there are any questions, please contact me.       Francine Trevino NP

## 2023-09-25 ENCOUNTER — HOSPITAL ENCOUNTER (EMERGENCY)
Facility: HOSPITAL | Age: 58
Discharge: HOME | End: 2023-09-25
Attending: EMERGENCY MEDICINE
Payer: MEDICARE

## 2023-09-25 VITALS
HEART RATE: 85 BPM | WEIGHT: 260 LBS | TEMPERATURE: 98 F | RESPIRATION RATE: 18 BRPM | OXYGEN SATURATION: 95 % | SYSTOLIC BLOOD PRESSURE: 136 MMHG | HEIGHT: 67 IN | DIASTOLIC BLOOD PRESSURE: 70 MMHG | BODY MASS INDEX: 40.81 KG/M2

## 2023-09-25 DIAGNOSIS — H92.02 OTALGIA OF LEFT EAR: Primary | ICD-10-CM

## 2023-09-25 DIAGNOSIS — R09.81 NASAL CONGESTION: ICD-10-CM

## 2023-09-25 PROCEDURE — 99284 EMERGENCY DEPT VISIT MOD MDM: CPT | Mod: ER

## 2023-09-25 RX ORDER — ACETAMINOPHEN 500 MG
500 TABLET ORAL EVERY 4 HOURS PRN
Qty: 20 TABLET | Refills: 0 | Status: SHIPPED | OUTPATIENT
Start: 2023-09-25

## 2023-09-25 RX ORDER — FLUTICASONE PROPIONATE 50 MCG
1 SPRAY, SUSPENSION (ML) NASAL 2 TIMES DAILY PRN
Qty: 15 G | Refills: 0 | Status: SHIPPED | OUTPATIENT
Start: 2023-09-25

## 2023-09-25 RX ORDER — IBUPROFEN 600 MG/1
600 TABLET ORAL EVERY 6 HOURS PRN
Qty: 20 TABLET | Refills: 0 | Status: SHIPPED | OUTPATIENT
Start: 2023-09-25 | End: 2024-03-26

## 2023-09-25 NOTE — ED PROVIDER NOTES
Encounter Date: 9/25/2023    SCRIBE #1 NOTE: I, Yeni Juárez, am scribing for, and in the presence of,  Vania Mcnamara NP. I have scribed the following portions of the note - Other sections scribed: HPI, ROS.       History     Chief Complaint   Patient presents with    Otalgia     A 57 y/o male presents to the ER c/o L Otalgia and left sided HA since last night. Pt took Tylenol last night approx. 8 pm without relief.      Randolph Blue is a 58 y.o. male, with a PMHx of HLD, Glaucoma, who presents to the ED with L otalgia for since yesterday. Patient reports pre-existing congestion and rhinorrhea for approximately 2 months. Attempted Tx with Nyquil, cough syrup and Tylenol with no relief. Patient recently had rotator cuff surgery, and then developed bronchitis. Saw PCP 1 week ago. Currently taking doxycycline for his shoulder. No other exacerbating or alleviating factors. Denies fever, sore throat, dental pain or other associated symptoms.       The history is provided by the patient. No  was used.     Review of patient's allergies indicates:   Allergen Reactions    Percocet [oxycodone-acetaminophen]      Other reaction(s): Rash     Past Medical History:   Diagnosis Date    Arthritis     Bronchitis     Cervical arthritis     SILVIA (generalized anxiety disorder) 01/03/2014    GERD (gastroesophageal reflux disease)     Glaucoma     Hyperlipidemia, mixed 04/03/2014    Hypertension      Past Surgical History:   Procedure Laterality Date    KNEE SURGERY      bilateral    MANDIBLE FRACTURE SURGERY      PROSTATE SURGERY      SHOULDER SURGERY      [place] at [facility].      Family History   Problem Relation Age of Onset    Cancer Mother     Hypertension Mother     Heart disease Father     Hypertension Maternal Uncle     Hypertension Paternal Uncle     Hypertension Maternal Grandmother     Heart disease Paternal Grandmother     Hypertension Paternal Grandmother      Social History     Tobacco Use     Smoking status: Never    Smokeless tobacco: Never   Substance Use Topics    Alcohol use: Not Currently     Alcohol/week: 3.3 - 4.2 standard drinks of alcohol     Types: 4 - 5 Standard drinks or equivalent per week     Comment: No alcohol x 2 weeks    Drug use: No     Review of Systems   Constitutional:  Negative for chills, diaphoresis and fever.   HENT:  Positive for congestion (pre-existing), ear pain (L) and rhinorrhea (pre-existing). Negative for sore throat.         (-) dental pain   Eyes:  Negative for pain.   Respiratory:  Negative for cough and shortness of breath.    Cardiovascular:  Negative for chest pain.   Gastrointestinal:  Negative for abdominal pain, diarrhea, nausea and vomiting.   Genitourinary:  Negative for dysuria.   Musculoskeletal:  Negative for back pain.        (-) Arm or leg trouble.    Skin:  Negative for rash.   Neurological:  Negative for headaches.   Psychiatric/Behavioral:  Negative for confusion.        Physical Exam     Initial Vitals [09/25/23 0953]   BP Pulse Resp Temp SpO2   136/70 85 18 97.6 °F (36.4 °C) 95 %      MAP       --         Physical Exam    Vitals reviewed.  Constitutional: He appears well-developed and well-nourished. He is not diaphoretic.  Non-toxic appearance. He does not have a sickly appearance. He does not appear ill. No distress.   HENT:   Head: Normocephalic and atraumatic.   Right Ear: Tympanic membrane, external ear and ear canal normal. No tenderness. No mastoid tenderness.   Left Ear: Tympanic membrane, external ear and ear canal normal. No tenderness. No mastoid tenderness.   Nose: Mucosal edema and rhinorrhea present.   Mouth/Throat: Oropharynx is clear and moist. No oropharyngeal exudate.   Eyes: Conjunctivae and EOM are normal. Pupils are equal, round, and reactive to light.   Neck:   Normal range of motion.  Cardiovascular:  Normal rate, regular rhythm, normal heart sounds and intact distal pulses.           Pulmonary/Chest: No respiratory  distress.   Musculoskeletal:         General: Normal range of motion.      Cervical back: Normal range of motion.     Neurological: He is alert and oriented to person, place, and time. GCS eye subscore is 4. GCS verbal subscore is 5. GCS motor subscore is 6.   Skin: Skin is warm, dry and intact. No rash noted. No erythema.   Psychiatric: He has a normal mood and affect. Thought content normal.         ED Course   Procedures  Labs Reviewed - No data to display       Imaging Results    None          Medications - No data to display  Medical Decision Making  This is an evaluation of a 58 y.o. male that presents to the Emergency Department for Left ear pain. The patient is a non-toxic, afebrile, and well appearing male. On physical exam, left TM is within normal limits without erythema.  No perforation.  The canal is unremarkable.  There is no tragal or canal tenderness\pain and no mastoid tenderness or erythema.  There is rhinorrhea and mucosal edema.    Vital Signs Reassuring.   Patient declined concerns for COVID or the flu.    No evidence of infection today requiring antibiotic therapy. Given the above findings, I do not think the patient has OM, meningitis, OE, mastoiditis, perforated TM, foreign body, or systemic bacterial infection.    The diagnosis, treatment plan, instructions for follow-up and reevaluation with his PCP as well as ED return precautions have been discussed with the patient and he has verbalized an understanding of the information. All questions or concerns have been addressed.     Risk  OTC drugs.  Prescription drug management.                               Clinical Impression:   Final diagnoses:  [H92.02] Otalgia of left ear (Primary)  [R09.81] Nasal congestion        ED Disposition Condition    Discharge Stable          ED Prescriptions       Medication Sig Dispense Start Date End Date Auth. Provider    fluticasone propionate (FLONASE) 50 mcg/actuation nasal spray 1 spray (50 mcg total) by  Each Nostril route 2 (two) times daily as needed for Rhinitis or Allergies. 15 g 9/25/2023 -- Vania Mcnamara NP    acetaminophen (TYLENOL) 500 MG tablet Take 1 tablet (500 mg total) by mouth every 4 (four) hours as needed for Pain. 20 tablet 9/25/2023 -- Vania Mcnamara NP    ibuprofen (ADVIL,MOTRIN) 600 MG tablet Take 1 tablet (600 mg total) by mouth every 6 (six) hours as needed for Pain. 20 tablet 9/25/2023 -- Vania Mcnamara NP          Follow-up Information       Follow up With Specialties Details Why Contact Info    Cee Solo, DO Family Medicine Schedule an appointment as soon as possible for a visit in 2 days For follow-up [address] [facility] [place] [address]  [phone]      [place] [facility] ED Emergency Medicine Go to  If symptoms worsen [address] [facility] [address] [phone]             Vania Mcnamara NP  09/25/23 6460

## 2023-09-25 NOTE — DISCHARGE INSTRUCTIONS
Thank you for coming to our Emergency Department today. It is important to remember that some problems or medical conditions are difficult to diagnose and may not be found during your Emergency Department visit.     Be sure to follow up with your primary care doctor and review all labs/imaging/tests that were performed during your ER visit with them. Some labs/tests may be outside of the normal range and require non-emergent follow-up and further investigation to help diagnose/exclude/prevent complications or other potentially serious medical conditions that were not addressed during your ER visit.    If you do not have a primary care doctor, you may contact the one listed on your discharge paperwork or you may also call the [facility] Appointment Desk at [phone] to schedule an appointment and establish care with one. It is important to your health that you have a primary care doctor.    Please take all medications as directed. All medications may potentially have side-effects and it is impossible to predict which medications may give you side-effects or what side-effects (if any) they will give you.. If you feel that you are having a negative effect or side-effect of any medication you should immediately stop taking them and seek medical attention. If you feel that you are having a life-threatening reaction call 911.    Return to the ER with any questions/concerns, new/concerning symptoms, worsening or failure to improve.     Do not drive, swim, climb to height, take a bath, operate heavy machinery, drink alcohol or take potentially sedating medications, sign any legal documents or make any important decisions for 24 hours if you have received any pain medications, sedatives or mood altering drugs during your ER visit or within 24 hours of taking them if they have been prescribed to you.     You can find additional resources for Dentists, hearing aids, durable medical equipment, low cost pharmacies and  other resources at [email or web address].org    BELOW THIS LINE ONLY APPLIES IF YOU HAVE A COVID TEST PENDING OR IF YOU HAVE BEEN DIAGNOSED WITH COVID:  Please access [facility] to review the results of your test. Until the results of your COVID test return, you should isolate yourself so as not to potentially spread illness to others.   If your COVID test returns positive, you should isolate yourself so as not to spread illness to others. After five full days, if you are feeling better and you have not had fever for 24 hours, you can return to your typical daily activities, but you must wear a mask around others for an additional 5 days.   If your COVID test returns negative and you are either unvaccinated or more than six months out from your two-dose vaccine and are not yet boosted, you should still quarantine for 5 full days followed by strict mask use for an additional 5 full days.   If your COVID test returns negative and you have received your 2-dose initial vaccine as well as a booster, you should continue strict mask use for 10 full days after the exposure.  For all those exposed, best practice includes a test at day 5 after the exposure. This can be a home test or a test through one of the many testing centers throughout our community.   Masking is always advised to limit the spread of COVID. Cdc.gov is an excellent site to obtain the latest up to date recommendations regarding COVID and COVID testing.     CDC Testing and Quarantine Guidelines for patients with exposure to a known-positive COVID-19 person:  A close exposure is defined as anyone who has had an exposure (masked or unmasked) to a known COVID -19 positive person within 6 feet of someone for a cumulative total of 15 minutes or more over a 24-hour period.   Vaccinated and/or if you recently had a positive covid test within 90 days do NOT need to quarantine after contact with someone who had COVID-19 unless you develop symptoms.   Fully vaccinated  people who have not had a positive test within 90 days, should get tested 3-5 days after their exposure, even if they don't have symptoms and wear a mask indoors in public for 14 days following exposure or until their test result is negative.      Unvaccinated and/or NOT had a positive test within 90 days and meet close exposure  You are required by CDC guidelines to quarantine for at least 5 days from time of exposure followed by 5 days of strict masking. It is recommended, but not required to test after 5 days, unless you develop symptoms, in which case you should test at that time.  If you get tested after 5 days and your test is positive, your 5 day period of isolation starts the day of the positive test.    If your exposure does not meet the above definition, you can return to your normal daily activities to include social distancing, wearing a mask and frequent handwashing.      Here is a link to guidance from the CDC:  https://www.cdc.gov/media/releases/2021/s1227-isolation-quarantine-guidance.html      [place] Dept Of Health Testing Sites:  https://ldh.la.gov/page/3934      [facility] website with testing locations and guidance:  [email or web address]

## 2023-10-06 ENCOUNTER — OFFICE VISIT (OUTPATIENT)
Dept: FAMILY MEDICINE | Facility: CLINIC | Age: 58
End: 2023-10-06
Payer: MEDICARE

## 2023-10-06 VITALS
HEIGHT: 67 IN | DIASTOLIC BLOOD PRESSURE: 64 MMHG | SYSTOLIC BLOOD PRESSURE: 130 MMHG | BODY MASS INDEX: 41.51 KG/M2 | OXYGEN SATURATION: 95 % | HEART RATE: 84 BPM | TEMPERATURE: 99 F | WEIGHT: 264.44 LBS

## 2023-10-06 DIAGNOSIS — R42 DIZZINESS: ICD-10-CM

## 2023-10-06 DIAGNOSIS — J06.9 VIRAL URI: Primary | ICD-10-CM

## 2023-10-06 PROCEDURE — 3044F HG A1C LEVEL LT 7.0%: CPT | Mod: CPTII,S$GLB,, | Performed by: FAMILY MEDICINE

## 2023-10-06 PROCEDURE — 99999 PR PBB SHADOW E&M-EST. PATIENT-LVL IV: CPT | Mod: PBBFAC,,, | Performed by: FAMILY MEDICINE

## 2023-10-06 PROCEDURE — 4010F ACE/ARB THERAPY RXD/TAKEN: CPT | Mod: CPTII,S$GLB,, | Performed by: FAMILY MEDICINE

## 2023-10-06 PROCEDURE — 3008F BODY MASS INDEX DOCD: CPT | Mod: CPTII,S$GLB,, | Performed by: FAMILY MEDICINE

## 2023-10-06 PROCEDURE — 3075F PR MOST RECENT SYSTOLIC BLOOD PRESS GE 130-139MM HG: ICD-10-PCS | Mod: CPTII,S$GLB,, | Performed by: FAMILY MEDICINE

## 2023-10-06 PROCEDURE — 3075F SYST BP GE 130 - 139MM HG: CPT | Mod: CPTII,S$GLB,, | Performed by: FAMILY MEDICINE

## 2023-10-06 PROCEDURE — 3008F PR BODY MASS INDEX (BMI) DOCUMENTED: ICD-10-PCS | Mod: CPTII,S$GLB,, | Performed by: FAMILY MEDICINE

## 2023-10-06 PROCEDURE — 99214 PR OFFICE/OUTPT VISIT, EST, LEVL IV, 30-39 MIN: ICD-10-PCS | Mod: S$GLB,,, | Performed by: FAMILY MEDICINE

## 2023-10-06 PROCEDURE — 1159F MED LIST DOCD IN RCRD: CPT | Mod: CPTII,S$GLB,, | Performed by: FAMILY MEDICINE

## 2023-10-06 PROCEDURE — 3078F PR MOST RECENT DIASTOLIC BLOOD PRESSURE < 80 MM HG: ICD-10-PCS | Mod: CPTII,S$GLB,, | Performed by: FAMILY MEDICINE

## 2023-10-06 PROCEDURE — 1159F PR MEDICATION LIST DOCUMENTED IN MEDICAL RECORD: ICD-10-PCS | Mod: CPTII,S$GLB,, | Performed by: FAMILY MEDICINE

## 2023-10-06 PROCEDURE — 3044F PR MOST RECENT HEMOGLOBIN A1C LEVEL <7.0%: ICD-10-PCS | Mod: CPTII,S$GLB,, | Performed by: FAMILY MEDICINE

## 2023-10-06 PROCEDURE — 99999 PR PBB SHADOW E&M-EST. PATIENT-LVL IV: ICD-10-PCS | Mod: PBBFAC,,, | Performed by: FAMILY MEDICINE

## 2023-10-06 PROCEDURE — 3078F DIAST BP <80 MM HG: CPT | Mod: CPTII,S$GLB,, | Performed by: FAMILY MEDICINE

## 2023-10-06 PROCEDURE — 99214 OFFICE O/P EST MOD 30 MIN: CPT | Mod: S$GLB,,, | Performed by: FAMILY MEDICINE

## 2023-10-06 PROCEDURE — 4010F PR ACE/ARB THEARPY RXD/TAKEN: ICD-10-PCS | Mod: CPTII,S$GLB,, | Performed by: FAMILY MEDICINE

## 2023-10-06 RX ORDER — MECLIZINE HCL 12.5 MG 12.5 MG/1
12.5 TABLET ORAL 3 TIMES DAILY PRN
Qty: 30 TABLET | Refills: 0 | Status: ON HOLD | OUTPATIENT
Start: 2023-10-06 | End: 2024-04-02 | Stop reason: HOSPADM

## 2023-10-06 RX ORDER — LORATADINE 10 MG/1
10 TABLET ORAL DAILY PRN
Qty: 30 TABLET | Refills: 3 | Status: SHIPPED | OUTPATIENT
Start: 2023-10-06 | End: 2024-02-11

## 2023-10-06 NOTE — PROGRESS NOTES
[facility]  Progress Note    SUBJECTIVE:     Chief Complaint   Patient presents with    Otalgia     Hosp f//u    Dizziness       HPI   Randolph Blue  is a 58 y.o. male here for ER follow-up for L ear pain. Ear doing much better since. Having some occasional dizziness. No fevers, chills, cough, congestion, SOB.     Review of patient's allergies indicates:   Allergen Reactions    Percocet [oxycodone-acetaminophen]      Other reaction(s): Rash       Past Medical History:   Diagnosis Date    Arthritis     Bronchitis     Cervical arthritis     SILVIA (generalized anxiety disorder) 01/03/2014    GERD (gastroesophageal reflux disease)     Glaucoma     Hyperlipidemia, mixed 04/03/2014    Hypertension      Past Surgical History:   Procedure Laterality Date    KNEE SURGERY      bilateral    MANDIBLE FRACTURE SURGERY      PROSTATE SURGERY      SHOULDER SURGERY      [place] [facility].      Family History   Problem Relation Age of Onset    Cancer Mother     Hypertension Mother     Heart disease Father     Hypertension Maternal Uncle     Hypertension Paternal Uncle     Hypertension Maternal Grandmother     Heart disease Paternal Grandmother     Hypertension Paternal Grandmother      Social History     Tobacco Use    Smoking status: Never    Smokeless tobacco: Never   Substance Use Topics    Alcohol use: Not Currently     Alcohol/week: 3.3 - 4.2 standard drinks of alcohol     Types: 4 - 5 Standard drinks or equivalent per week     Comment: No alcohol x 2 weeks    Drug use: No        Review of Systems   Constitutional:  Negative for chills, fever and malaise/fatigue.   HENT:  Negative for congestion, hearing loss and sore throat.    Respiratory:  Positive for cough. Negative for shortness of breath and wheezing.    Cardiovascular:  Negative for chest pain.   Gastrointestinal:  Negative for nausea and vomiting.   Neurological:  Negative for weakness and headaches.   All other systems reviewed and are  negative.    OBJECTIVE:     Vitals:    10/06/23 1442   BP: 130/64   Pulse: 84   Temp: 98.5 °F (36.9 °C)     Body mass index is 41.42 kg/m².    Physical Exam  Constitutional:       General: He is not in acute distress.     Appearance: He is not diaphoretic.   HENT:      Head: Normocephalic and atraumatic.      Nose: Nose normal.   Eyes:      Conjunctiva/sclera: Conjunctivae normal.   Cardiovascular:      Rate and Rhythm: Normal rate and regular rhythm.      Heart sounds: Normal heart sounds. No murmur heard.     No friction rub. No gallop.   Pulmonary:      Effort: Pulmonary effort is normal. No respiratory distress.      Breath sounds: Normal breath sounds. No wheezing or rales.   Abdominal:      Palpations: Abdomen is soft.   Skin:     General: Skin is warm.   Neurological:      Mental Status: He is alert and oriented to person, place, and time.         Old records were reviewed. Labs and/or images were independently reviewed.    ASSESSMENT     1. Viral URI    2. Dizziness        PLAN:     Viral URI  -     loratadine (CLARITIN) 10 mg tablet; Take 1 tablet (10 mg total) by mouth daily as needed for Allergies (or runny nose).  Dispense: 30 tablet; Refill: 3  -     OK to take tylenol as needed PRN fever. Take mucinex and or claritin to help decrease congestion. Educated patient to drink plenty of fluids and to take vitamin C to help boost immune system. Instructed patient to call or RTC if symptoms persist or worsen.      Dizziness  -     meclizine (ANTIVERT) 12.5 mg tablet; Take 1 tablet (12.5 mg total) by mouth 3 (three) times daily as needed for Dizziness.  Dispense: 30 tablet; Refill: 0      RTC PRN  30 minutes of total time spent on the encounter, which includes face to face time and non-face to face time preparing to see the patient (eg, review of tests), Obtaining and/or reviewing separately obtained history, Documenting clinical information in the electronic or other health record, Independently interpreting  results (not separately reported), communicating results to the patient/family/caregiver, and/or Care coordination (not separately reported).     Kd Severino MD  10/06/2023 2:51 PM

## 2023-12-04 ENCOUNTER — OFFICE VISIT (OUTPATIENT)
Dept: FAMILY MEDICINE | Facility: CLINIC | Age: 58
End: 2023-12-04
Payer: MEDICARE

## 2023-12-04 VITALS
TEMPERATURE: 98 F | HEIGHT: 67 IN | WEIGHT: 265.44 LBS | DIASTOLIC BLOOD PRESSURE: 66 MMHG | BODY MASS INDEX: 41.66 KG/M2 | OXYGEN SATURATION: 94 % | SYSTOLIC BLOOD PRESSURE: 110 MMHG | HEART RATE: 90 BPM

## 2023-12-04 DIAGNOSIS — Z23 NEED FOR INFLUENZA VACCINATION: ICD-10-CM

## 2023-12-04 DIAGNOSIS — F51.04 PSYCHOPHYSIOLOGICAL INSOMNIA: ICD-10-CM

## 2023-12-04 DIAGNOSIS — E78.2 HYPERLIPIDEMIA, MIXED: ICD-10-CM

## 2023-12-04 DIAGNOSIS — R73.03 PRE-DIABETES: ICD-10-CM

## 2023-12-04 DIAGNOSIS — F41.1 GAD (GENERALIZED ANXIETY DISORDER): ICD-10-CM

## 2023-12-04 DIAGNOSIS — B96.89 ACUTE BACTERIAL BRONCHITIS: Primary | ICD-10-CM

## 2023-12-04 DIAGNOSIS — J20.8 ACUTE BACTERIAL BRONCHITIS: Primary | ICD-10-CM

## 2023-12-04 DIAGNOSIS — J34.89 NASAL SORE: ICD-10-CM

## 2023-12-04 DIAGNOSIS — J45.901 ASTHMA WITH ACUTE EXACERBATION, UNSPECIFIED ASTHMA SEVERITY, UNSPECIFIED WHETHER PERSISTENT: ICD-10-CM

## 2023-12-04 DIAGNOSIS — I10 ESSENTIAL HYPERTENSION: ICD-10-CM

## 2023-12-04 PROCEDURE — 3078F DIAST BP <80 MM HG: CPT | Mod: CPTII,S$GLB,, | Performed by: FAMILY MEDICINE

## 2023-12-04 PROCEDURE — 3074F PR MOST RECENT SYSTOLIC BLOOD PRESSURE < 130 MM HG: ICD-10-PCS | Mod: CPTII,S$GLB,, | Performed by: FAMILY MEDICINE

## 2023-12-04 PROCEDURE — 99999 PR PBB SHADOW E&M-EST. PATIENT-LVL V: ICD-10-PCS | Mod: PBBFAC,,, | Performed by: FAMILY MEDICINE

## 2023-12-04 PROCEDURE — 3008F BODY MASS INDEX DOCD: CPT | Mod: CPTII,S$GLB,, | Performed by: FAMILY MEDICINE

## 2023-12-04 PROCEDURE — 90686 FLU VACCINE (QUAD) GREATER THAN OR EQUAL TO 3YO PRESERVATIVE FREE IM: ICD-10-PCS | Mod: S$GLB,,, | Performed by: FAMILY MEDICINE

## 2023-12-04 PROCEDURE — 1159F PR MEDICATION LIST DOCUMENTED IN MEDICAL RECORD: ICD-10-PCS | Mod: CPTII,S$GLB,, | Performed by: FAMILY MEDICINE

## 2023-12-04 PROCEDURE — 4010F PR ACE/ARB THEARPY RXD/TAKEN: ICD-10-PCS | Mod: CPTII,S$GLB,, | Performed by: FAMILY MEDICINE

## 2023-12-04 PROCEDURE — 4010F ACE/ARB THERAPY RXD/TAKEN: CPT | Mod: CPTII,S$GLB,, | Performed by: FAMILY MEDICINE

## 2023-12-04 PROCEDURE — 3044F PR MOST RECENT HEMOGLOBIN A1C LEVEL <7.0%: ICD-10-PCS | Mod: CPTII,S$GLB,, | Performed by: FAMILY MEDICINE

## 2023-12-04 PROCEDURE — 99214 OFFICE O/P EST MOD 30 MIN: CPT | Mod: S$GLB,,, | Performed by: FAMILY MEDICINE

## 2023-12-04 PROCEDURE — 1159F MED LIST DOCD IN RCRD: CPT | Mod: CPTII,S$GLB,, | Performed by: FAMILY MEDICINE

## 2023-12-04 PROCEDURE — 99999 PR PBB SHADOW E&M-EST. PATIENT-LVL V: CPT | Mod: PBBFAC,,, | Performed by: FAMILY MEDICINE

## 2023-12-04 PROCEDURE — G0008 FLU VACCINE (QUAD) GREATER THAN OR EQUAL TO 3YO PRESERVATIVE FREE IM: ICD-10-PCS | Mod: S$GLB,,, | Performed by: FAMILY MEDICINE

## 2023-12-04 PROCEDURE — 99214 PR OFFICE/OUTPT VISIT, EST, LEVL IV, 30-39 MIN: ICD-10-PCS | Mod: S$GLB,,, | Performed by: FAMILY MEDICINE

## 2023-12-04 PROCEDURE — 90686 IIV4 VACC NO PRSV 0.5 ML IM: CPT | Mod: S$GLB,,, | Performed by: FAMILY MEDICINE

## 2023-12-04 PROCEDURE — G0008 ADMIN INFLUENZA VIRUS VAC: HCPCS | Mod: S$GLB,,, | Performed by: FAMILY MEDICINE

## 2023-12-04 PROCEDURE — 3074F SYST BP LT 130 MM HG: CPT | Mod: CPTII,S$GLB,, | Performed by: FAMILY MEDICINE

## 2023-12-04 PROCEDURE — 1160F RVW MEDS BY RX/DR IN RCRD: CPT | Mod: CPTII,S$GLB,, | Performed by: FAMILY MEDICINE

## 2023-12-04 PROCEDURE — 3044F HG A1C LEVEL LT 7.0%: CPT | Mod: CPTII,S$GLB,, | Performed by: FAMILY MEDICINE

## 2023-12-04 PROCEDURE — 3078F PR MOST RECENT DIASTOLIC BLOOD PRESSURE < 80 MM HG: ICD-10-PCS | Mod: CPTII,S$GLB,, | Performed by: FAMILY MEDICINE

## 2023-12-04 PROCEDURE — 3008F PR BODY MASS INDEX (BMI) DOCUMENTED: ICD-10-PCS | Mod: CPTII,S$GLB,, | Performed by: FAMILY MEDICINE

## 2023-12-04 PROCEDURE — 1160F PR REVIEW ALL MEDS BY PRESCRIBER/CLIN PHARMACIST DOCUMENTED: ICD-10-PCS | Mod: CPTII,S$GLB,, | Performed by: FAMILY MEDICINE

## 2023-12-04 RX ORDER — PREDNISONE 20 MG/1
20 TABLET ORAL 2 TIMES DAILY
Qty: 10 TABLET | Refills: 0 | Status: SHIPPED | OUTPATIENT
Start: 2023-12-04 | End: 2023-12-09

## 2023-12-04 RX ORDER — PROMETHAZINE HYDROCHLORIDE AND DEXTROMETHORPHAN HYDROBROMIDE 6.25; 15 MG/5ML; MG/5ML
SYRUP ORAL
Status: CANCELLED | OUTPATIENT
Start: 2023-12-04

## 2023-12-04 RX ORDER — PREDNISONE 20 MG/1
20 TABLET ORAL 2 TIMES DAILY
Status: CANCELLED | OUTPATIENT
Start: 2023-12-04

## 2023-12-04 RX ORDER — MUPIROCIN 20 MG/G
OINTMENT TOPICAL 3 TIMES DAILY
Qty: 30 G | Refills: 1 | Status: ON HOLD | OUTPATIENT
Start: 2023-12-04 | End: 2024-04-02 | Stop reason: HOSPADM

## 2023-12-04 RX ORDER — DOXYCYCLINE 100 MG/1
100 CAPSULE ORAL 2 TIMES DAILY
Status: CANCELLED | OUTPATIENT
Start: 2023-12-04

## 2023-12-04 RX ORDER — FLUOXETINE HYDROCHLORIDE 40 MG/1
40 CAPSULE ORAL DAILY
Qty: 90 CAPSULE | Refills: 3 | Status: SHIPPED | OUTPATIENT
Start: 2023-12-04 | End: 2024-12-03

## 2023-12-04 RX ORDER — NAPROXEN 500 MG/1
1 TABLET ORAL 2 TIMES DAILY WITH MEALS
Status: ON HOLD | COMMUNITY
End: 2024-04-02 | Stop reason: HOSPADM

## 2023-12-04 RX ORDER — ROSUVASTATIN CALCIUM 10 MG/1
10 TABLET, COATED ORAL NIGHTLY
Qty: 90 TABLET | Refills: 3 | Status: SHIPPED | OUTPATIENT
Start: 2023-12-04

## 2023-12-04 RX ORDER — AMITRIPTYLINE HYDROCHLORIDE 100 MG/1
100 TABLET ORAL NIGHTLY PRN
Qty: 90 TABLET | Refills: 3 | Status: SHIPPED | OUTPATIENT
Start: 2023-12-04

## 2023-12-04 RX ORDER — LOSARTAN POTASSIUM 50 MG/1
50 TABLET ORAL DAILY
Qty: 90 TABLET | Refills: 3 | Status: SHIPPED | OUTPATIENT
Start: 2023-12-04

## 2023-12-04 RX ORDER — NAPROXEN 500 MG/1
500 TABLET ORAL 2 TIMES DAILY WITH MEALS
Qty: 180 TABLET | Refills: 3 | Status: CANCELLED | OUTPATIENT
Start: 2023-12-04 | End: 2024-12-03

## 2023-12-04 RX ORDER — PROMETHAZINE HYDROCHLORIDE 25 MG/1
25 TABLET ORAL EVERY 6 HOURS PRN
COMMUNITY
Start: 2023-09-11

## 2023-12-04 RX ORDER — OXYCODONE HYDROCHLORIDE 5 MG/1
5 CAPSULE ORAL EVERY 4 HOURS PRN
Status: CANCELLED | OUTPATIENT
Start: 2023-12-04

## 2023-12-04 RX ORDER — HYDROCHLOROTHIAZIDE 25 MG/1
25 TABLET ORAL DAILY
Qty: 90 TABLET | Refills: 3 | Status: SHIPPED | OUTPATIENT
Start: 2023-12-04

## 2023-12-04 RX ORDER — AMOXICILLIN AND CLAVULANATE POTASSIUM 875; 125 MG/1; MG/1
1 TABLET, FILM COATED ORAL EVERY 12 HOURS
Qty: 14 TABLET | Refills: 0 | Status: SHIPPED | OUTPATIENT
Start: 2023-12-04 | End: 2023-12-11

## 2023-12-04 NOTE — LETTER
December 4, 2023      [facility]  [address]  [place] [address]  Phone: [phone]  Fax: [phone]       Patient: Randolph Blue   YOB: 1965  Date of Visit: 12/04/2023    To Whom It May Concern:    Daniel Blue  was at [facility] on 12/04/2023. The patient may return to work/school on *** {With/no:16697} restrictions. If you have any questions or concerns, or if I can be of further assistance, please do not hesitate to contact me.    Sincerely,    Cee Solo, DO

## 2023-12-04 NOTE — PROGRESS NOTES
Patient given Flu vaccine via injection. 0 Complaints of, tolerated well. VIS given, instructed to wait in lobby 15 minutes to observe adverse reactions. Patient verbalized understanding.

## 2023-12-04 NOTE — PROGRESS NOTES
Assessment & Plan:    Acute bacterial bronchitis  -     amoxicillin-clavulanate 875-125mg (AUGMENTIN) 875-125 mg per tablet; Take 1 tablet by mouth every 12 (twelve) hours. for 7 days  Dispense: 14 tablet; Refill: 0    Asthma with acute exacerbation, unspecified asthma severity, unspecified whether persistent  -     predniSONE (DELTASONE) 20 MG tablet; Take 1 tablet (20 mg total) by mouth 2 (two) times daily. for 5 days  Dispense: 10 tablet; Refill: 0    Start Augmetin and steroid.   Advised close follow up if symptoms do not improve.    Nasal sore  -     mupirocin (BACTROBAN) 2 % ointment; Apply topically 3 (three) times daily.  Dispense: 30 g; Refill: 1    Start Bactroban ointment    Essential hypertension  -     losartan (COZAAR) 50 MG tablet; Take 1 tablet (50 mg total) by mouth once daily.  Dispense: 90 tablet; Refill: 3  -     hydroCHLOROthiazide (HYDRODIURIL) 25 MG tablet; Take 1 tablet (25 mg total) by mouth once daily.  Dispense: 90 tablet; Refill: 3    Controlled. Medication(s) refilled.     Pre-diabetes  -     Hemoglobin A1C; Future; Expected date: 12/04/2023    Fasting labs to be scheduled.    Hyperlipidemia, mixed  -     rosuvastatin (CRESTOR) 10 MG tablet; Take 1 tablet (10 mg total) by mouth every evening.  Dispense: 90 tablet; Refill: 3  -     Lipid Panel; Future; Expected date: 12/04/2023    Statin refilled.    SILVIA (generalized anxiety disorder)  -     FLUoxetine 40 MG capsule; Take 1 capsule (40 mg total) by mouth once daily.  Dispense: 90 capsule; Refill: 3    Controlled. Medication(s) refilled.     Psychophysiological insomnia  -     amitriptyline (ELAVIL) 100 MG tablet; Take 1 tablet (100 mg total) by mouth nightly as needed for Insomnia.  Dispense: 90 tablet; Refill: 3    Controlled. Medication(s) refilled.     Need for influenza vaccination  -     Influenza - Quadrivalent (PF)      Encouraged pneumonia, RSV, and shingles vaccines.     Follow-up: Follow up in about 6 months (around  "6/4/2024).  ______________________________________________________________________    Chief Complaint  Chief Complaint   Patient presents with    Health Maintenance       HPI  Randolph Blue is a 58 y.o. male with medical diagnoses as listed in the medical history and problem list that presents to the office to follow up on his chronic conditions. He was last seen to [facility] care on 5/30. He c/o persistent "coughing up cold" since he was last seen in the office about 2 months ago. Endorses occasional wheezing. He is also requesting medication for recurrent sores in his nose.      Most recent pertinent workup:     Last CBC Results:   Lab Results   Component Value Date    WBC 7.2 11/01/2023    HGB 12.4 (L) 11/01/2023    HCT 36.6 (L) 11/01/2023     09/05/2023       Last CMP Results  Lab Results   Component Value Date     09/05/2023    K 2.9 (L) 09/05/2023     09/05/2023    CO2 29 09/05/2023    BUN 15 09/05/2023    CREATININE 0.9 09/05/2023    CALCIUM 9.4 09/05/2023    ALBUMIN 3.4 (L) 09/05/2023    AST 18 09/05/2023    ALT 16 09/05/2023       Last Lipids  Lab Results   Component Value Date    CHOL 156 05/31/2023    TRIG 139 05/31/2023    HDL 38 (L) 05/31/2023    LDLCALC 90.2 05/31/2023       Last A1C  Lab Results   Component Value Date    HGBA1C 5.8 (H) 05/31/2023         Health Maintenance         Date Due Completion Date    COVID-19 Vaccine (1) Never done ---    Pneumococcal Vaccines (Age 0-64) (1 - PCV) Never done ---    Shingles Vaccine (1 of 2) Never done ---    RSV Vaccine (Age 60+ and Pregnant patients) (1 - 1-dose 60+ series) Never done ---    Influenza Vaccine (1) Never done ---    TETANUS VACCINE 06/01/2032 (Originally 6/23/1981) ---    Hemoglobin A1c (Prediabetes) 05/31/2024 5/31/2023    Colorectal Cancer Screening 04/10/2025 4/10/2015    Lipid Panel 05/31/2028 5/31/2023              PAST MEDICAL HISTORY:  Past Medical History:   Diagnosis Date    Arthritis     Bronchitis     Cervical " arthritis     SILVIA (generalized anxiety disorder) 01/03/2014    GERD (gastroesophageal reflux disease)     Glaucoma     Hyperlipidemia, mixed 04/03/2014    Hypertension        PAST SURGICAL HISTORY:  Past Surgical History:   Procedure Laterality Date    KNEE SURGERY      bilateral    MANDIBLE FRACTURE SURGERY      PROSTATE SURGERY      SHOULDER SURGERY      [place] [facility].        SOCIAL HISTORY:  Social History     Socioeconomic History    Marital status: Single   Tobacco Use    Smoking status: Never    Smokeless tobacco: Never   Substance and Sexual Activity    Alcohol use: Not Currently     Alcohol/week: 3.3 - 4.2 standard drinks of alcohol     Types: 4 - 5 Standard drinks or equivalent per week     Comment: No alcohol x 2 weeks    Drug use: No    Sexual activity: Not Currently       FAMILY HISTORY:  Family History   Problem Relation Age of Onset    Cancer Mother     Hypertension Mother     Heart disease Father     Hypertension Maternal Uncle     Hypertension Paternal Uncle     Hypertension Maternal Grandmother     Heart disease Paternal Grandmother     Hypertension Paternal Grandmother        ALLERGIES AND MEDICATIONS: updated and reviewed.  Review of patient's allergies indicates:   Allergen Reactions    Percocet [oxycodone-acetaminophen]      Other reaction(s): Rash     Current Outpatient Medications   Medication Sig Dispense Refill    acetaminophen (TYLENOL) 500 MG tablet Take 1 tablet (500 mg total) by mouth every 4 (four) hours as needed for Pain. 20 tablet 0    albuterol (PROVENTIL/VENTOLIN HFA) 90 mcg/actuation inhaler Inhale 1-2 puffs into the lungs every 4 (four) hours as needed for Wheezing. 18 g 11    fluticasone propionate (FLONASE) 50 mcg/actuation nasal spray 1 spray (50 mcg total) by Each Nostril route 2 (two) times daily as needed for Rhinitis or Allergies. 15 g 0    ibuprofen (ADVIL,MOTRIN) 600 MG tablet Take 1 tablet (600 mg total) by mouth every 6 (six) hours as needed for Pain. 20  tablet 0    latanoprost 0.005 % ophthalmic solution       leuprolide acetate, 6 month, (ELIGARD) 45 mg injection Inject 45 mg into the skin every 6 (six) months.      leuprolide, 6 month, (ELIGARD) 45 mg injection Inject 45 mg into the skin.      loratadine (CLARITIN) 10 mg tablet Take 1 tablet (10 mg total) by mouth daily as needed for Allergies (or runny nose). 30 tablet 3    LUPRON DEPOT, 6 MONTH, 45 mg SyKt injection Inject into the muscle.      meclizine (ANTIVERT) 12.5 mg tablet Take 1 tablet (12.5 mg total) by mouth 3 (three) times daily as needed for Dizziness. 30 tablet 0    meloxicam (MOBIC) 15 MG tablet Take 15 mg by mouth once daily.      miscellaneous medical supply (C-TUB) Misc vacuum erection device      montelukast (SINGULAIR) 10 mg tablet Take 1 tablet (10 mg total) by mouth every evening. 30 tablet 11    mv-min/folic/K1/lycopen/lutein (CENTRUM MEN 50 PLUS MINIS ORAL) Take by mouth.      naproxen (NAPROSYN) 500 MG tablet Take 1 tablet by mouth 2 (two) times daily with meals.      oxyCODONE (OXY-IR) 5 mg Cap Take 5 mg by mouth every 4 (four) hours as needed for Pain.      potassium chloride SA (K-DUR,KLOR-CON) 20 MEQ tablet Take 1 tablet (20 mEq total) by mouth once daily. 90 tablet 3    promethazine (PHENERGAN) 25 MG tablet Take 25 mg by mouth every 6 (six) hours as needed.      amitriptyline (ELAVIL) 100 MG tablet Take 1 tablet (100 mg total) by mouth nightly as needed for Insomnia. 90 tablet 3    amoxicillin-clavulanate 875-125mg (AUGMENTIN) 875-125 mg per tablet Take 1 tablet by mouth every 12 (twelve) hours. for 7 days 14 tablet 0    chlorhexidine (PERIDEX) 0.12 % solution       FLUoxetine 40 MG capsule Take 1 capsule (40 mg total) by mouth once daily. 90 capsule 3    hydroCHLOROthiazide (HYDRODIURIL) 25 MG tablet Take 1 tablet (25 mg total) by mouth once daily. 90 tablet 3    losartan (COZAAR) 50 MG tablet Take 1 tablet (50 mg total) by mouth once daily. 90 tablet 3    mupirocin (BACTROBAN) 2 %  "ointment Apply topically 3 (three) times daily. 30 g 1    predniSONE (DELTASONE) 20 MG tablet Take 1 tablet (20 mg total) by mouth 2 (two) times daily. for 5 days 10 tablet 0    rosuvastatin (CRESTOR) 10 MG tablet Take 1 tablet (10 mg total) by mouth every evening. 90 tablet 3     No current facility-administered medications for this visit.         ROS  Review of Systems   Constitutional:  Negative for activity change and fever.   HENT:  Positive for postnasal drip and rhinorrhea. Negative for congestion.    Respiratory:  Positive for wheezing. Negative for shortness of breath.    Psychiatric/Behavioral:  Positive for sleep disturbance.            Physical Exam  Vitals:    12/04/23 1532   BP: 110/66   BP Location: Right arm   Patient Position: Sitting   BP Method: Large (Manual)   Pulse: 90   Temp: 97.8 °F (36.6 °C)   TempSrc: Oral   SpO2: (!) 94%   Weight: 120.4 kg (265 lb 6.9 oz)   Height: 5' 7" (1.702 m)    Body mass index is 41.57 kg/m².  Weight: 120.4 kg (265 lb 6.9 oz)   Height: 5' 7" (170.2 cm)   Physical Exam  Constitutional:       General: He is not in acute distress.     Appearance: He is obese.   HENT:      Head: Normocephalic and atraumatic.      Nose: No signs of injury.      Right Nostril: No epistaxis.      Left Nostril: No epistaxis.      Right Turbinates: Swollen.      Left Turbinates: Swollen.   Cardiovascular:      Rate and Rhythm: Normal rate and regular rhythm.      Pulses: Normal pulses.      Heart sounds: Normal heart sounds.   Pulmonary:      Effort: Pulmonary effort is normal. No respiratory distress.      Breath sounds: Normal breath sounds.   Skin:     General: Skin is warm and dry.      Findings: No rash.   Neurological:      General: No focal deficit present.      Mental Status: He is alert and oriented to person, place, and time.   Psychiatric:         Mood and Affect: Mood normal.         Behavior: Behavior normal.         Thought Content: Thought content normal.             "

## 2023-12-05 ENCOUNTER — PATIENT MESSAGE (OUTPATIENT)
Dept: FAMILY MEDICINE | Facility: CLINIC | Age: 58
End: 2023-12-05
Payer: MEDICARE

## 2023-12-05 ENCOUNTER — LAB VISIT (OUTPATIENT)
Dept: LAB | Facility: HOSPITAL | Age: 58
End: 2023-12-05
Attending: FAMILY MEDICINE
Payer: MEDICARE

## 2023-12-05 DIAGNOSIS — R73.03 PRE-DIABETES: ICD-10-CM

## 2023-12-05 DIAGNOSIS — R73.03 PRE-DIABETES: Primary | ICD-10-CM

## 2023-12-05 DIAGNOSIS — E78.2 HYPERLIPIDEMIA, MIXED: ICD-10-CM

## 2023-12-05 DIAGNOSIS — I10 ESSENTIAL HYPERTENSION: ICD-10-CM

## 2023-12-05 LAB
CHOLEST SERPL-MCNC: 158 MG/DL (ref 120–199)
CHOLEST/HDLC SERPL: 4.4 {RATIO} (ref 2–5)
ESTIMATED AVG GLUCOSE: 126 MG/DL (ref 68–131)
HBA1C MFR BLD: 6 % (ref 4–5.6)
HDLC SERPL-MCNC: 36 MG/DL (ref 40–75)
HDLC SERPL: 22.8 % (ref 20–50)
LDLC SERPL CALC-MCNC: 98.4 MG/DL (ref 63–159)
NONHDLC SERPL-MCNC: 122 MG/DL
TRIGL SERPL-MCNC: 118 MG/DL (ref 30–150)

## 2023-12-05 PROCEDURE — 80061 LIPID PANEL: CPT | Performed by: FAMILY MEDICINE

## 2023-12-05 PROCEDURE — 83036 HEMOGLOBIN GLYCOSYLATED A1C: CPT | Performed by: FAMILY MEDICINE

## 2023-12-05 PROCEDURE — 36415 COLL VENOUS BLD VENIPUNCTURE: CPT | Mod: PO | Performed by: FAMILY MEDICINE

## 2023-12-05 NOTE — TELEPHONE ENCOUNTER
Uncertain if patient checks his portal consistently. His labs are stable. A1c is still in pre-diabetes range. Cholesterol panel is controlled. Repeat fasting labs before his next appointment in 6 mo.

## 2023-12-05 NOTE — TELEPHONE ENCOUNTER
Call placed to patient to relay MD message related to lab results and scheduling repeat lab prior to 6 month visit. Voicemail left for return call to clinic. Awaiting call back.

## 2023-12-06 NOTE — TELEPHONE ENCOUNTER
Pt notified of doctor's instructions below. Pt verbalized understanding. Lab appt scheduled as ordered.

## 2024-01-26 ENCOUNTER — OFFICE VISIT (OUTPATIENT)
Dept: CARDIOLOGY | Facility: CLINIC | Age: 59
End: 2024-01-26
Payer: MEDICARE

## 2024-01-26 VITALS
OXYGEN SATURATION: 96 % | RESPIRATION RATE: 18 BRPM | DIASTOLIC BLOOD PRESSURE: 62 MMHG | HEIGHT: 67 IN | SYSTOLIC BLOOD PRESSURE: 118 MMHG | WEIGHT: 261.94 LBS | HEART RATE: 90 BPM | BODY MASS INDEX: 41.11 KG/M2

## 2024-01-26 DIAGNOSIS — E66.01 CLASS 3 SEVERE OBESITY DUE TO EXCESS CALORIES WITH SERIOUS COMORBIDITY AND BODY MASS INDEX (BMI) OF 40.0 TO 44.9 IN ADULT: ICD-10-CM

## 2024-01-26 DIAGNOSIS — F41.1 GAD (GENERALIZED ANXIETY DISORDER): ICD-10-CM

## 2024-01-26 DIAGNOSIS — I10 HYPERTENSION, UNSPECIFIED TYPE: ICD-10-CM

## 2024-01-26 DIAGNOSIS — E78.2 HYPERLIPIDEMIA, MIXED: Primary | ICD-10-CM

## 2024-01-26 PROCEDURE — 3008F BODY MASS INDEX DOCD: CPT | Mod: CPTII,S$GLB,, | Performed by: INTERNAL MEDICINE

## 2024-01-26 PROCEDURE — 99214 OFFICE O/P EST MOD 30 MIN: CPT | Mod: S$GLB,,, | Performed by: INTERNAL MEDICINE

## 2024-01-26 PROCEDURE — 3074F SYST BP LT 130 MM HG: CPT | Mod: CPTII,S$GLB,, | Performed by: INTERNAL MEDICINE

## 2024-01-26 PROCEDURE — 3078F DIAST BP <80 MM HG: CPT | Mod: CPTII,S$GLB,, | Performed by: INTERNAL MEDICINE

## 2024-01-26 PROCEDURE — 99999 PR PBB SHADOW E&M-EST. PATIENT-LVL III: CPT | Mod: PBBFAC,,, | Performed by: INTERNAL MEDICINE

## 2024-01-26 PROCEDURE — 93000 ELECTROCARDIOGRAM COMPLETE: CPT | Mod: S$GLB,,, | Performed by: INTERNAL MEDICINE

## 2024-01-26 NOTE — PROGRESS NOTES
CARDIOVASCULAR CONSULTATION    REASON FOR CONSULT:   Randolph Blue is a 58 y.o. male who presents for evaluation    HISTORY OF PRESENT ILLNESS:     Patient is a pleasant 57-year-old man.  Has had a few episodes of chest pains.  Went to ER yesterday.  Has been referred here for further evaluation.  Chest pain was right-sided.  No particular aggravating or relieving factors.  States that he is had similar chest pain in the past also.  Denies any orthopnea, PND, swelling of feet.    Notes from September 23:  Patient here for follow-up.  Denies any chest pains at rest on exertion, orthopnea, PND.  Stress echo was negative for ischemia in the echo portion but positive of ischemia in the EKG portion.  Patient is chest pain-free.  On exercise did not have any chest pains.  States that his shoulder pain is lifestyle limiting and he really needs to go for arthroscopic surgery at [facility] and cannot wait for that     Notes from January 2024:  Patient here for follow-up.  No new cardiac complaints.  No chest pains at rest on exertion, orthopnea, PND.      The left ventricle is normal in size with normal systolic function.  The estimated ejection fraction is 55%.  Normal right ventricular size with normal right ventricular systolic function.  The estimated PA systolic pressure is 35 mmHg.  The ECG portion of this study is positive for myocardial ischemia.  The stress echo portion of this study is negative for myocardial ischemia.    PAST MEDICAL HISTORY:     Past Medical History:   Diagnosis Date    Arthritis     Bronchitis     Cervical arthritis     SILVIA (generalized anxiety disorder) 01/03/2014    GERD (gastroesophageal reflux disease)     Glaucoma     Hyperlipidemia, mixed 04/03/2014    Hypertension        PAST SURGICAL HISTORY:     Past Surgical History:   Procedure Laterality Date    KNEE SURGERY      bilateral    MANDIBLE FRACTURE SURGERY      PROSTATE SURGERY      SHOULDER SURGERY      [place] at [facility].         ALLERGIES AND MEDICATION:     Review of patient's allergies indicates:   Allergen Reactions    Percocet [oxycodone-acetaminophen]      Other reaction(s): Rash        Medication List            Accurate as of January 26, 2024 11:07 AM. If you have any questions, ask your nurse or doctor.                CONTINUE taking these medications      acetaminophen 500 MG tablet  Commonly known as: TYLENOL  Take 1 tablet (500 mg total) by mouth every 4 (four) hours as needed for Pain.     albuterol 90 mcg/actuation inhaler  Commonly known as: PROVENTIL/VENTOLIN HFA  Inhale 1-2 puffs into the lungs every 4 (four) hours as needed for Wheezing.     amitriptyline 100 MG tablet  Commonly known as: ELAVIL  Take 1 tablet (100 mg total) by mouth nightly as needed for Insomnia.     C-TUB Misc  Generic drug: miscellaneous medical supply     CENTRUM MEN 50 PLUS MINIS ORAL     chlorhexidine 0.12 % solution  Commonly known as: PERIDEX     FLUoxetine 40 MG capsule  Take 1 capsule (40 mg total) by mouth once daily.     fluticasone propionate 50 mcg/actuation nasal spray  Commonly known as: FLONASE  1 spray (50 mcg total) by Each Nostril route 2 (two) times daily as needed for Rhinitis or Allergies.     hydroCHLOROthiazide 25 MG tablet  Commonly known as: HYDRODIURIL  Take 1 tablet (25 mg total) by mouth once daily.     ibuprofen 600 MG tablet  Commonly known as: ADVIL,MOTRIN  Take 1 tablet (600 mg total) by mouth every 6 (six) hours as needed for Pain.     latanoprost 0.005 % ophthalmic solution     * LUPRON DEPOT (6 MONTH) 45 mg Sykt injection  Generic drug: leuprolide acetate (6 month)     * leuprolide acetate (6 month) 45 mg injection  Commonly known as: ELIGARD     * leuprolide acetate (6 month) 45 mg injection  Commonly known as: ELIGARD     loratadine 10 mg tablet  Commonly known as: CLARITIN  Take 1 tablet (10 mg total) by mouth daily as needed for Allergies (or runny nose).     losartan 50 MG tablet  Commonly known as:  COZAAR  Take 1 tablet (50 mg total) by mouth once daily.     meclizine 12.5 mg tablet  Commonly known as: ANTIVERT  Take 1 tablet (12.5 mg total) by mouth 3 (three) times daily as needed for Dizziness.     meloxicam 15 MG tablet  Commonly known as: MOBIC     montelukast 10 mg tablet  Commonly known as: SINGULAIR  Take 1 tablet (10 mg total) by mouth every evening.     mupirocin 2 % ointment  Commonly known as: BACTROBAN  Apply topically 3 (three) times daily.     naproxen 500 MG tablet  Commonly known as: NAPROSYN     oxyCODONE 5 mg Cap  Commonly known as: OXY-IR     potassium chloride SA 20 MEQ tablet  Commonly known as: K-DUR,KLOR-CON  Take 1 tablet (20 mEq total) by mouth once daily.     promethazine 25 MG tablet  Commonly known as: PHENERGAN     rosuvastatin 10 MG tablet  Commonly known as: CRESTOR  Take 1 tablet (10 mg total) by mouth every evening.           * This list has 3 medication(s) that are the same as other medications prescribed for you. Read the directions carefully, and ask your doctor or other care provider to review them with you.                  SOCIAL HISTORY:     Social History     Socioeconomic History    Marital status: Single   Tobacco Use    Smoking status: Never    Smokeless tobacco: Never   Substance and Sexual Activity    Alcohol use: Not Currently     Alcohol/week: 3.3 - 4.2 standard drinks of alcohol     Types: 4 - 5 Standard drinks or equivalent per week     Comment: No alcohol x 2 weeks    Drug use: No    Sexual activity: Not Currently       FAMILY HISTORY:     Family History   Problem Relation Age of Onset    Cancer Mother     Hypertension Mother     Heart disease Father     Hypertension Maternal Uncle     Hypertension Paternal Uncle     Hypertension Maternal Grandmother     Heart disease Paternal Grandmother     Hypertension Paternal Grandmother        REVIEW OF SYSTEMS:   Review of Systems   Constitutional: Negative.   HENT: Negative.     Eyes: Negative.    Respiratory:  "Negative.     Endocrine: Negative.    Hematologic/Lymphatic: Negative.    Skin: Negative.    Musculoskeletal: Negative.    Gastrointestinal: Negative.    Genitourinary: Negative.    Neurological: Negative.    Psychiatric/Behavioral: Negative.     Allergic/Immunologic: Negative.        A 10 point review of systems was performed and all the pertinent positives have been mentioned. Rest of review of systems was negative.        PHYSICAL EXAM:     Vitals:    01/26/24 1018   BP: 118/62   Pulse: 90   Resp: 18    Body mass index is 41.02 kg/m².  Weight: 118.8 kg (261 lb 14.5 oz)   Height: 5' 7" (170.2 cm)     Physical Exam  Vitals reviewed.   Constitutional:       Appearance: He is well-developed.   HENT:      Head: Normocephalic.   Eyes:      Conjunctiva/sclera: Conjunctivae normal.      Pupils: Pupils are equal, round, and reactive to light.   Cardiovascular:      Rate and Rhythm: Normal rate and regular rhythm.      Heart sounds: Normal heart sounds.   Pulmonary:      Effort: Pulmonary effort is normal.      Breath sounds: Normal breath sounds.   Abdominal:      General: Bowel sounds are normal.      Palpations: Abdomen is soft.   Musculoskeletal:      Cervical back: Normal range of motion and neck supple.   Skin:     General: Skin is warm.   Neurological:      Mental Status: He is alert and oriented to person, place, and time.         DATA:     Laboratory:  CBC:  Recent Labs   Lab 05/31/23  0735 09/05/23  1345 11/01/23  1336   WBC 5.20 6.54 7.2   Hemoglobin 13.0 L 12.5 L 12.4 L   Hematocrit 40.6 37.8 L 36.6 L   Platelets 309 276  --          CHEMISTRIES:  Recent Labs   Lab 11/04/22  1305 11/30/22  1613 02/01/23  1635 05/03/23  1610 05/31/23  0735 09/05/23  1345   Glucose  --  102  --   --  102 92   Sodium 138 140 140 140 139 140   Potassium 3.7 3.8 3.8 3.7 3.1 L 2.9 L   BUN 22.0 18 19.0 18.0 14 15   Creatinine 0.98 0.9 0.96 0.98 0.8 0.9   eGFR  89 L  --  91 89 L  --   --    Calcium 9.6 10.0 9.9 10.2 9.8 " 9.4         CARDIAC BIOMARKERS:        COAGS:  Recent Labs   Lab 06/15/23  1034 09/05/23  1345   INR 1.5 H 1.1         LIPIDS/LFTS:  Recent Labs   Lab 05/31/22  1604 10/26/22  1501 05/03/23  1610 05/31/23  0735 09/05/23  1345 12/05/23  0808   Cholesterol 169  --   --  156  --  158   Triglycerides 145  --   --  139  --  118   HDL 44  --   --  38 L  --  36 L   LDL Cholesterol 96.0  --   --  90.2  --  98.4   Non-HDL Cholesterol 125  --   --  118  --  122   AST  --    < > 18 19 18  --    ALT  --    < > 15 17 16  --     < > = values in this interval not displayed.         Hemoglobin A1C   Date Value Ref Range Status   12/05/2023 6.0 (H) 4.0 - 5.6 % Final     Comment:     ADA Screening Guidelines:  5.7-6.4%  Consistent with prediabetes  >or=6.5%  Consistent with diabetes    High levels of fetal hemoglobin interfere with the HbA1C  assay. Heterozygous hemoglobin variants (HbS, HgC, etc)do  not significantly interfere with this assay.   However, presence of multiple variants may affect accuracy.     05/31/2023 5.8 (H) 4.0 - 5.6 % Final     Comment:     ADA Screening Guidelines:  5.7-6.4%  Consistent with prediabetes  >or=6.5%  Consistent with diabetes    High levels of fetal hemoglobin interfere with the HbA1C  assay. Heterozygous hemoglobin variants (HbS, HgC, etc)do  not significantly interfere with this assay.   However, presence of multiple variants may affect accuracy.     11/30/2022 5.9 (H) 4.0 - 5.6 % Final     Comment:     ADA Screening Guidelines:  5.7-6.4%  Consistent with prediabetes  >or=6.5%  Consistent with diabetes    High levels of fetal hemoglobin interfere with the HbA1C  assay. Heterozygous hemoglobin variants (HbS, HgC, etc)do  not significantly interfere with this assay.   However, presence of multiple variants may affect accuracy.         TSH  Recent Labs   Lab 06/22/23  0740   TSH 2.576         The 10-year ASCVD risk score (Ashvin LOPEZ, et al., 2019) is: 11.9%    Values used to calculate the score:       "Age: 58 years      Sex: Male      Is Non- : Yes      Diabetic: No      Tobacco smoker: No      Systolic Blood Pressure: 118 mmHg      Is BP treated: Yes      HDL Cholesterol: 36 mg/dL      Total Cholesterol: 158 mg/dL       BNP    No results found for: "BNP"          ASSESSMENT AND PLAN     Patient Active Problem List   Diagnosis    BPH (benign prostatic hyperplasia)    Anxiety    SILVIA (generalized anxiety disorder)    Hyperlipidemia, mixed    Acute renal failure syndrome    Dehydration    Class 3 severe obesity due to excess calories with serious comorbidity and body mass index (BMI) of 40.0 to 44.9 in adult     Further evaluation with the help of stress echocardiogram was done.  The EKG portion was positive for ischemia, however the stress echo portion was negative for ischemia.  Patient did not have any chest pains during exercise.  Has been angina free.  Thinks that his episode of chest pain was likely musculoskeletal.  Continue risk factor modification    Follow-up after 6 m          Thank you very much for involving me in the care of your patient.  Please do not hesitate to contact me if there are any questions.      Marilu Brady MD, FACC, [facility]  Interventional Cardiologist, [facility].           This note was dictated with the help of speech recognition software.  There might be un-intended errors and/or substitutions.                "

## 2024-02-10 DIAGNOSIS — J06.9 VIRAL URI: ICD-10-CM

## 2024-02-10 NOTE — TELEPHONE ENCOUNTER
No care due was identified.  [facility] Embedded Care Due Messages. Reference number: [id number].   2/10/2024 7:01:34 AM CST

## 2024-02-11 RX ORDER — LORATADINE 10 MG/1
TABLET ORAL
Qty: 30 TABLET | Refills: 0 | Status: SHIPPED | OUTPATIENT
Start: 2024-02-11 | End: 2024-04-12 | Stop reason: ALTCHOICE

## 2024-02-11 NOTE — TELEPHONE ENCOUNTER
Refill Routing Note   Medication(s) are not appropriate for processing by [facility] for the following reason(s):        No active prescription written by provider    [facility] action(s):  Defer               Appointments  past 12m or future 3m with PCP    Date Provider   Last Visit   12/4/2023 Cee Solo, DO   Next Visit   6/4/2024 Cee Solo, DO   ED visits in past 90 days: 0        Note composed:3:31 AM 02/11/2024

## 2024-03-26 ENCOUNTER — HOSPITAL ENCOUNTER (EMERGENCY)
Facility: HOSPITAL | Age: 59
Discharge: HOME | End: 2024-03-27
Attending: EMERGENCY MEDICINE
Payer: MEDICARE

## 2024-03-26 DIAGNOSIS — R91.1 INCIDENTAL PULMONARY NODULE, > 3MM AND < 8MM: Primary | ICD-10-CM

## 2024-03-26 DIAGNOSIS — R07.9 CHEST PAIN: ICD-10-CM

## 2024-03-26 DIAGNOSIS — R31.0 GROSS HEMATURIA: ICD-10-CM

## 2024-03-26 LAB
ALBUMIN SERPL-MCNC: 3.8 G/DL (ref 3.3–5.5)
ALBUMIN SERPL-MCNC: 3.9 G/DL (ref 3.3–5.5)
ALLENS TEST: ABNORMAL
ALP SERPL-CCNC: 102 U/L (ref 42–141)
ALP SERPL-CCNC: 107 U/L (ref 42–141)
BILIRUB SERPL-MCNC: 0.8 MG/DL (ref 0.2–1.6)
BILIRUB SERPL-MCNC: 0.9 MG/DL (ref 0.2–1.6)
BILIRUBIN, POC UA: NEGATIVE
BLOOD, POC UA: NEGATIVE
BUN SERPL-MCNC: 14 MG/DL (ref 7–22)
CALCIUM SERPL-MCNC: 10.4 MG/DL (ref 8–10.3)
CHLORIDE SERPL-SCNC: 103 MMOL/L (ref 98–108)
CLARITY, POC UA: CLEAR
COLOR, POC UA: ABNORMAL
CREAT SERPL-MCNC: 1 MG/DL (ref 0.6–1.2)
GLUCOSE SERPL-MCNC: 114 MG/DL (ref 73–118)
GLUCOSE, POC UA: NEGATIVE
HCO3 UR-SCNC: 29.2 MMOL/L (ref 24–28)
HCT, POC: NORMAL
HGB, POC: NORMAL (ref 14–18)
KETONES, POC UA: ABNORMAL
LDH SERPL L TO P-CCNC: 1.62 MMOL/L (ref 0.5–2.2)
LEUKOCYTE EST, POC UA: NEGATIVE
MCH, POC: NORMAL
MCHC, POC: NORMAL
MCV, POC: NORMAL
MPV, POC: NORMAL
NITRITE, POC UA: NEGATIVE
PCO2 BLDA: 47.8 MMHG (ref 35–45)
PH SMN: 7.39 [PH] (ref 7.35–7.45)
PH UR STRIP: 5.5 [PH]
PO2 BLDA: 26 MMHG (ref 40–60)
POC ALT (SGPT): 17 U/L (ref 10–47)
POC ALT (SGPT): 20 U/L (ref 10–47)
POC AMYLASE: 63 U/L (ref 14–97)
POC AST (SGOT): 24 U/L (ref 11–38)
POC AST (SGOT): 29 U/L (ref 11–38)
POC B-TYPE NATRIURETIC PEPTIDE: <5 PG/ML (ref 0–100)
POC BE: 3 MMOL/L
POC CARDIAC TROPONIN I: 0 NG/ML (ref 0–0.08)
POC GGT: 46 U/L (ref 5–65)
POC PLATELET COUNT: NORMAL
POC PTINR: 1.2 (ref 0.9–1.2)
POC PTWBT: 14.8 SEC (ref 9.7–14.3)
POC SATURATED O2: 47 % (ref 95–100)
POC TCO2: 29 MMOL/L (ref 18–33)
POC TCO2: 31 MMOL/L (ref 24–29)
POTASSIUM BLD-SCNC: 3.8 MMOL/L (ref 3.6–5.1)
PROTEIN, POC UA: ABNORMAL
PROTEIN, POC: 8.3 G/DL (ref 6.4–8.1)
PROTEIN, POC: 8.5 G/DL (ref 6.4–8.1)
RBC, POC: NORMAL
RDW, POC: NORMAL
SAMPLE: ABNORMAL
SAMPLE: ABNORMAL
SAMPLE: NORMAL
SITE: ABNORMAL
SODIUM BLD-SCNC: 143 MMOL/L (ref 128–145)
SPECIFIC GRAVITY, POC UA: >=1.03
UROBILINOGEN, POC UA: 0.2 E.U./DL
WBC, POC: NORMAL

## 2024-03-26 PROCEDURE — 82040 ASSAY OF SERUM ALBUMIN: CPT | Mod: 59,ER

## 2024-03-26 PROCEDURE — 83605 ASSAY OF LACTIC ACID: CPT | Mod: ER

## 2024-03-26 PROCEDURE — 82150 ASSAY OF AMYLASE: CPT | Mod: ER

## 2024-03-26 PROCEDURE — 99285 EMERGENCY DEPT VISIT HI MDM: CPT | Mod: 25,ER

## 2024-03-26 PROCEDURE — 84484 ASSAY OF TROPONIN QUANT: CPT | Mod: ER

## 2024-03-26 PROCEDURE — 93005 ELECTROCARDIOGRAM TRACING: CPT | Mod: ER

## 2024-03-26 PROCEDURE — 93010 ELECTROCARDIOGRAM REPORT: CPT | Mod: ,,, | Performed by: INTERNAL MEDICINE

## 2024-03-26 PROCEDURE — 80053 COMPREHEN METABOLIC PANEL: CPT | Mod: ER

## 2024-03-26 PROCEDURE — 83880 ASSAY OF NATRIURETIC PEPTIDE: CPT | Mod: ER

## 2024-03-26 PROCEDURE — 85025 COMPLETE CBC W/AUTO DIFF WBC: CPT | Mod: ER

## 2024-03-27 VITALS
OXYGEN SATURATION: 97 % | TEMPERATURE: 98 F | SYSTOLIC BLOOD PRESSURE: 127 MMHG | BODY MASS INDEX: 40.02 KG/M2 | RESPIRATION RATE: 19 BRPM | HEIGHT: 67 IN | WEIGHT: 255 LBS | HEART RATE: 81 BPM | DIASTOLIC BLOOD PRESSURE: 71 MMHG

## 2024-03-27 PROCEDURE — 25500020 PHARM REV CODE 255: Mod: ER | Performed by: EMERGENCY MEDICINE

## 2024-03-27 RX ADMIN — IOHEXOL 100 ML: 350 INJECTION, SOLUTION INTRAVENOUS at 12:03

## 2024-03-27 NOTE — ED TRIAGE NOTES
Complains of intermittent chest pain since Saturday. States yesterday took gas/reflux pill w/ some relief. States he noted blood in urine today, hx of prostate surgery and this concerned him to come get both issues evaluated. Denies any pain at time of triage.

## 2024-03-27 NOTE — ED PROVIDER NOTES
Encounter Date: 3/26/2024       History     Chief Complaint   Patient presents with    Chest Pain    Hematuria     58 y.o. male with GERD, hypertension, hyperlipidemia, prostate cancer (status post prostatectomy 09/2021), elevated BMI and others presents to emergency department complaining of acute, constant, right-sided chest pain that began two days ago.  Patient states he was asleep when the pain began.  He also mentions having increased belching and flatus and gross hematuria at the beginning of the urinary stream x2 episodes over the last few days but denies nausea, vomiting, diarrhea, constipation, dysuria, frequency, hesitancy, oliguria, cough, shortness of breath, dyspnea on exertion, leg swelling, dizziness, lightheadedness, bright red blood per rectum, melena, decreased appetite, focal weakness or syncope.  He reports pain gradually radiating to the midsternal chest and down to the epigastric area over the course of two days and resolved last night.  He reports taking 281 mg aspirin tablets yesterday and today, heartburn tablet and a gas tablet which alleviated the symptoms.    Patient states he stopped drinking alcohol 9-1/2 years ago.  He denies tobacco use and denies illicit drug use.  He denies anticoagulant use.  Prior surgeries include prostatectomy.    The history is provided by the patient.     Review of patient's allergies indicates:   Allergen Reactions    Percocet [oxycodone-acetaminophen]      Other reaction(s): Rash     Past Medical History:   Diagnosis Date    Arthritis     Bronchitis     Cervical arthritis     SILVIA (generalized anxiety disorder) 01/03/2014    GERD (gastroesophageal reflux disease)     Glaucoma     Hyperlipidemia, mixed 04/03/2014    Hypertension      Past Surgical History:   Procedure Laterality Date    KNEE SURGERY      bilateral    MANDIBLE FRACTURE SURGERY      PROSTATE SURGERY      SHOULDER SURGERY      [place] at [facility].      Family History   Problem Relation  Age of Onset    Cancer Mother     Hypertension Mother     Heart disease Father     Hypertension Maternal Uncle     Hypertension Paternal Uncle     Hypertension Maternal Grandmother     Heart disease Paternal Grandmother     Hypertension Paternal Grandmother      Social History     Tobacco Use    Smoking status: Never    Smokeless tobacco: Never   Substance Use Topics    Alcohol use: Not Currently     Alcohol/week: 3.3 - 4.2 standard drinks of alcohol     Types: 4 - 5 Standard drinks or equivalent per week     Comment: No alcohol x 2 weeks    Drug use: No     Review of Systems   Constitutional:  Negative for appetite change and fever.   Respiratory:  Negative for cough and shortness of breath.    Cardiovascular:  Positive for chest pain. Negative for palpitations and leg swelling.   Gastrointestinal:  Positive for abdominal pain. Negative for constipation, diarrhea, nausea and vomiting.   Genitourinary:  Positive for hematuria. Negative for decreased urine volume, difficulty urinating, dysuria and flank pain.   Musculoskeletal:  Positive for back pain (chonic LBP unchanged from baseline). Negative for gait problem.   Neurological:  Negative for dizziness, light-headedness and headaches.   All other systems reviewed and are negative.      Physical Exam     Initial Vitals [03/26/24 2113]   BP Pulse Resp Temp SpO2   130/76 96 18 98.4 °F (36.9 °C) 98 %      MAP       --         Physical Exam    Nursing note and vitals reviewed.  Constitutional: He appears well-developed and well-nourished. He is not diaphoretic. He is Obese . He is cooperative.  Non-toxic appearance. He does not have a sickly appearance. No distress.   HENT:   Head: Normocephalic and atraumatic.   Mouth/Throat: Oropharynx is clear and moist.   Eyes: Conjunctivae are normal.   Neck: Phonation normal. No stridor present.   Normal range of motion.  Cardiovascular:  Regular rhythm and intact distal pulses.           Pulmonary/Chest: Effort normal. No  accessory muscle usage or stridor. No tachypnea. No respiratory distress. He has no decreased breath sounds. He has no wheezes. He has no rhonchi. He has no rales.   Abdominal: Abdomen is soft. He exhibits distension. There is no abdominal tenderness. There is no rebound and no guarding.   Musculoskeletal:         General: No tenderness. Normal range of motion.      Cervical back: Normal range of motion.     Neurological: He is alert and oriented to person, place, and time. He has normal strength. Gait normal. GCS score is 15. GCS eye subscore is 4. GCS verbal subscore is 5. GCS motor subscore is 6.   Skin: Skin is warm and intact.   Psychiatric: He has a normal mood and affect.         ED Course   Procedures  Labs Reviewed   POCT URINALYSIS W/O SCOPE - Abnormal; Notable for the following components:       Result Value    Ketones, UA Trace (*)     Spec Grav UA >=1.030 (*)     Protein, UA Trace (*)     All other components within normal limits   ISTAT PROCEDURE - Abnormal; Notable for the following components:    POC PCO2 47.8 (*)     POC PO2 26 (*)     POC HCO3 29.2 (*)     POC BE 3 (*)     POC TCO2 31 (*)     All other components within normal limits   ISTAT PROCEDURE - Abnormal; Notable for the following components:    POC PTWBT 14.8 (*)     All other components within normal limits   POCT CMP - Abnormal; Notable for the following components:    Calcium, POC 10.4 (*)     Protein, POC 8.3 (*)     All other components within normal limits   POCT LIVER PANEL - Abnormal; Notable for the following components:    Protein, POC 8.5 (*)     All other components within normal limits   TROPONIN ISTAT   POCT CBC   POCT CMP   POCT PROTIME-INR   POCT TROPONIN   POCT LIVER PANEL   POCT B-TYPE NATRIURETIC PEPTIDE (BNP)   POCT B-TYPE NATRIURETIC PEPTIDE (BNP)     EKG Readings: (Independently Interpreted)   Initial Reading: No STEMI. Rhythm: Normal Sinus Rhythm. Heart Rate: 95. Ectopy: No Ectopy. Conduction: Normal. ST Segments:  Normal ST Segments. T Waves: Normal. Axis: Normal. Clinical Impression: Normal Sinus Rhythm     ECG Results              EKG 12-lead (Final result)        Collection Time Result Time QRS Duration OHS QTC Calculation    03/26/24 21:03:11 03/28/24 22:18:42 78 454                     Final result by Interface, Lab In [facility] (03/28/24 22:18:49)                   Narrative:    Test Reason : R07.9,    Vent. Rate : 095 BPM     Atrial Rate : 095 BPM     P-R Int : 170 ms          QRS Dur : 078 ms      QT Int : 362 ms       P-R-T Axes : 063 015 053 degrees     QTc Int : 454 ms    Normal sinus rhythm  Normal ECG  When compared with ECG of 26-JAN-2024 10:25,  Nonspecific T wave abnormality no longer evident in Anterior leads  Confirmed by Caitlyn BRIONES, Marilu MANNING ([id number]) on 3/28/2024 10:18:40 PM    Referred By: [email or web address]   SELF           Confirmed By:Marilu Brady MD                                  Imaging Results               CTA Chest Abdomen Pelvis (Final result)  Result time 03/27/24 00:40:17      Final result by Estela Bojorquez MD (03/27/24 00:40:17)                   Impression:      No thoracoabdominal aortic dissection or aneurysm.    4 mm right apical subpleural pulmonary nodule.  For a solid nodule <6 mm, Fleischner Society 2017 guidelines recommend no routine follow up for a low risk patient, or follow-up with non-contrast chest CT at 12 months in a high risk patient.    Small fat containing umbilical hernia.    Colonic diverticulosis.    This report was flagged in Epic as abnormal.      Electronically signed by: Estela Bojorquez  Date:    03/27/2024  Time:    00:40               Narrative:    EXAMINATION:  CTA CHEST ABDOMEN PELVIS    CLINICAL HISTORY:  Aortic aneurysm, known or suspected;    TECHNIQUE:  2.5 mm enhanced axial images were obtained from the lung apices through the greater trochanters.  One hundred mL of Omnipaque 350 was injected.    COMPARISON:  None.    FINDINGS:  In the chest, there is no thoracic  aorta aneurysm or dissection.  There is a 4 mm right apical subpleural pulmonary nodule (series 2 axial image 18).  There is a 4 mm nodular density abutting the minor fissure, which may be a perifissural lymph node or part of the normal pleural reflection rather than a discrete pulmonary nodule.  (Series 2 axial image 28).  No pleural or pericardial effusion is detected.  There is no axillary, hilar, or mediastinal adenopathy.  There are benign-appearing fat containing lymph nodes in the axilla.  The heart size is within normal limits for size.  Moderate bibasilar atelectatic changes are present.  There is a linear plate of atelectasis or scarring in the lingula.    In the abdomen, there is no abdominal aortic aneurysm or dissection.  The liver, spleen, pancreas, right kidney and adrenal glands are unremarkable. The gallbladder contains no calcified gallstones.  There is mild left pelviectasis. There is no definite evidence for abdominal adenopathy or ascites.  Small fat containing umbilical hernia is present.    In the pelvis, there is no free fluid. There are no pelvic masses or adenopathy.  There is colonic diverticulosis. The appendix is not inflamed.  The prostate gland is absent.                                       X-Ray Chest PA And Lateral (Final result)  Result time 03/26/24 23:24:47      Final result by Estela Bojorquez MD (03/26/24 23:24:47)                   Impression:      No acute intrathoracic abnormality.      Electronically signed by: Estela Bojorquez  Date:    03/26/2024  Time:    23:24               Narrative:    EXAMINATION:  CHEST PA AND LATERAL    CLINICAL HISTORY:  Chest Pain;    TECHNIQUE:  PA and lateral chest radiograph    COMPARISON:  06/15/2023    FINDINGS:  The cardiac silhouette is within normal limits.   There is no focal consolidation, pneumothorax, or pleural effusion.  There is stable asymmetric elevation the right hemidiaphragm.                                        Medications   iohexoL (OMNIPAQUE 350) injection 100 mL (100 mLs Intravenous Given 3/27/24 0008)     Medical Decision Making  Exam without evidence of volume overload so doubt heart failure. EKG without signs of active ischemia. Given the timing of pain to ER presentation, single troponin was negative so doubt NSTEMI. Presentation not consistent with acute PE (Wells low risk),pneumothorax (not visualized on chest xr), thoracic aortic dissection, pericarditis, tamponade, pneumonia (no infectious symptoms, clear chest xr), myocarditis (no recent illness, neg trop). HEART score: 2 so plan to discharge with outpatient Cardiology follow-up.  Test results, imaging results, outpatient management plan, outpatient PCP/Cardiology follow up and ED return precautions discussed with patient with understanding and agreement.       Amount and/or Complexity of Data Reviewed  Labs: ordered.  Radiology: ordered.    Risk  Prescription drug management.      Additional MDM:   Heart Score:    History:          Slightly suspicious.  ECG:             Normal  Age:               45-65 years  Risk factors: 1-2 risk factors  Troponin:       Less than or equal to normal limit  Heart Score = 2                    Labs Reviewed  Pertinent lab and imaging findings include:  There is no leukocytosis, H&H within normal limits, BNP within normal limits, liver panel unremarkable, lactic acid 1.62, troponin 0.00, CMP unremarkable, urinalysis with trace ketones, elevated specific gravity, negative blood, trace protein, chest x-ray negative for acute abnormality, CTA CAP with incidental pulmonary nodule otherwise negative for acute abnormality    Admission on 03/26/2024, Discharged on 03/27/2024   Component Date Value Ref Range Status    QRS Duration 03/26/2024 78  ms Final    OHS QTC Calculation 03/26/2024 454  ms Final    Glucose, UA 03/26/2024 Negative   Final    Bilirubin, UA 03/26/2024 Negative   Final    Ketones, UA 03/26/2024 Trace (A)   Final     Spec Grav UA 03/26/2024 >=1.030 (>)   Final    Blood, UA 03/26/2024 Negative   Final    PH, UA 03/26/2024 5.5   Final    Protein, UA 03/26/2024 Trace (A)   Final    Urobilinogen, UA 03/26/2024 0.2  E.U./dL Final    Nitrite, UA 03/26/2024 Negative   Final    Leukocytes, UA 03/26/2024 Negative   Final    Color, UA 03/26/2024 Dark yellow   Final    Clarity, UA 03/26/2024 Clear   Final    POC PH 03/26/2024 7.393  7.35 - 7.45 Final    POC PCO2 03/26/2024 47.8 (H)  35 - 45 mmHg Final    POC PO2 03/26/2024 26 (LL)  40 - 60 mmHg Final    POC HCO3 03/26/2024 29.2 (H)  24 - 28 mmol/L Final    POC BE 03/26/2024 3 (H)  -2 to 2 mmol/L Final    POC SATURATED O2 03/26/2024 47  95 - 100 % Final    POC Lactate 03/26/2024 1.62  0.5 - 2.2 mmol/L Final    POC TCO2 03/26/2024 31 (H)  24 - 29 mmol/L Final    Sample 03/26/2024 VENOUS   Final    Site 03/26/2024 Other   Final    Allens Test 03/26/2024 N/A   Final    POC PTWBT 03/26/2024 14.8 (H)  9.7 - 14.3 sec Final    POC PTINR 03/26/2024 1.2  0.9 - 1.2 Final    Sample 03/26/2024 unknown   Final    Albumin, POC 03/26/2024 3.8  3.3 - 5.5 g/dL Final    Alkaline Phosphatase, POC 03/26/2024 107  42 - 141 U/L Final    ALT (SGPT), POC 03/26/2024 17  10 - 47 U/L Final    AST (SGOT), POC 03/26/2024 24  11 - 38 U/L Final    POC BUN 03/26/2024 14  7 - 22 mg/dL Final    Calcium, POC 03/26/2024 10.4 (H)  8.0 - 10.3 mg/dL Final    POC Chloride 03/26/2024 103  98 - 108 mmol/L Final    POC Creatinine 03/26/2024 1.0  0.6 - 1.2 mg/dL Final    POC Glucose 03/26/2024 114  73 - 118 mg/dL Final    POC Potassium 03/26/2024 3.8  3.6 - 5.1 mmol/L Final    POC Sodium 03/26/2024 143  128 - 145 mmol/L Final    Bilirubin, POC 03/26/2024 0.8  0.2 - 1.6 mg/dL Final    POC TCO2 03/26/2024 29  18 - 33 mmol/L Final    Protein, POC 03/26/2024 8.3 (H)  6.4 - 8.1 g/dL Final    POC Cardiac Troponin I 03/26/2024 0.00  0.00 - 0.08 ng/mL Final    Sample 03/26/2024 unknown   Final    Comment: A single negative troponin is  insufficient to rule out myocardial infarction.  The use of a serial sampling protocol is recommended practice. Correlate results with reference intervals established for methodology used. Point of care and core laboratory   troponin results are not interchangeable.      POC B-Type Natriuretic Peptide 03/26/2024 <5.0  0.0 - 100.0 pg/mL Final    Albumin, POC 03/26/2024 3.9  3.3 - 5.5 g/dL Final    Alkaline Phosphatase, POC 03/26/2024 102  42 - 141 U/L Final    ALT (SGPT), POC 03/26/2024 20  10 - 47 U/L Final    Amylase, POC 03/26/2024 63  14 - 97 U/L Final    AST (SGOT), POC 03/26/2024 29  11 - 38 U/L Final    POC GGT 03/26/2024 46  5 - 65 U/L Final    Bilirubin, POC 03/26/2024 0.9  0.2 - 1.6 mg/dL Final    Protein, POC 03/26/2024 8.5 (H)  6.4 - 8.1 g/dL Final        Imaging Reviewed    Imaging Results               CTA Chest Abdomen Pelvis (Final result)  Result time 03/27/24 00:40:17      Final result by Estela Bojorquez MD (03/27/24 00:40:17)                   Impression:      No thoracoabdominal aortic dissection or aneurysm.    4 mm right apical subpleural pulmonary nodule.  For a solid nodule <6 mm, Fleischner Society 2017 guidelines recommend no routine follow up for a low risk patient, or follow-up with non-contrast chest CT at 12 months in a high risk patient.    Small fat containing umbilical hernia.    Colonic diverticulosis.    This report was flagged in Epic as abnormal.      Electronically signed by: Estela Bojorquez  Date:    03/27/2024  Time:    00:40               Narrative:    EXAMINATION:  CTA CHEST ABDOMEN PELVIS    CLINICAL HISTORY:  Aortic aneurysm, known or suspected;    TECHNIQUE:  2.5 mm enhanced axial images were obtained from the lung apices through the greater trochanters.  One hundred mL of Omnipaque 350 was injected.    COMPARISON:  None.    FINDINGS:  In the chest, there is no thoracic aorta aneurysm or dissection.  There is a 4 mm right apical subpleural pulmonary nodule (series 2  axial image 18).  There is a 4 mm nodular density abutting the minor fissure, which may be a perifissural lymph node or part of the normal pleural reflection rather than a discrete pulmonary nodule.  (Series 2 axial image 28).  No pleural or pericardial effusion is detected.  There is no axillary, hilar, or mediastinal adenopathy.  There are benign-appearing fat containing lymph nodes in the axilla.  The heart size is within normal limits for size.  Moderate bibasilar atelectatic changes are present.  There is a linear plate of atelectasis or scarring in the lingula.    In the abdomen, there is no abdominal aortic aneurysm or dissection.  The liver, spleen, pancreas, right kidney and adrenal glands are unremarkable. The gallbladder contains no calcified gallstones.  There is mild left pelviectasis. There is no definite evidence for abdominal adenopathy or ascites.  Small fat containing umbilical hernia is present.    In the pelvis, there is no free fluid. There are no pelvic masses or adenopathy.  There is colonic diverticulosis. The appendix is not inflamed.  The prostate gland is absent.                                       X-Ray Chest PA And Lateral (Final result)  Result time 03/26/24 23:24:47      Final result by Estela Bojorquez MD (03/26/24 23:24:47)                   Impression:      No acute intrathoracic abnormality.      Electronically signed by: Estela Bojorquez  Date:    03/26/2024  Time:    23:24               Narrative:    EXAMINATION:  CHEST PA AND LATERAL    CLINICAL HISTORY:  Chest Pain;    TECHNIQUE:  PA and lateral chest radiograph    COMPARISON:  06/15/2023    FINDINGS:  The cardiac silhouette is within normal limits.   There is no focal consolidation, pneumothorax, or pleural effusion.  There is stable asymmetric elevation the right hemidiaphragm.                                      Medications given in ED    Medications   iohexoL (OMNIPAQUE 350) injection 100 mL (100 mLs Intravenous Given  3/27/24 0008)       Note was created using voice recognition software. Note may have occasional typographical errors that may not have been identified and edited despite good saurav initial review prior to signing.         Medical Decision Making:   Differential Diagnosis:    ACS, heart failure, dysrhythmia, pneumothorax, pneumonia, PE, COPD, aortic dissection, costochondritis, anxiety, and others        Clinical Tests:   Lab Tests: Ordered and Reviewed  Radiological Study: Ordered and Reviewed  Medical Tests: Ordered and Reviewed             Clinical Impression:  Final diagnoses:  [R07.9] Chest pain  [R91.1] Incidental pulmonary nodule, > 3mm and < 8mm (Primary)  [R31.0] Gross hematuria - resolved          ED Disposition Condition    Discharge Stable          ED Prescriptions    None       Follow-up Information       Follow up With Specialties Details Why Contact Info Additional Information    The nearest emergency department.  Go to  As needed, If symptoms worsen      Your PCP  Call  to schedule an appointment to schedule repeat CT chest within 12 months to monitor pulmonary nodule      [facility] Cardiology Call today to schedule an appointment, for re-evaluation of today's complaint, to arrange outpatient stress test within 72 hrs [address] [facility] [address]  [address]  [facility] [id number] [phone] Please park in garage or Medical Office Bldg. surface lot and use Medical Ofc Bldg elevator. Check in at [facility] [address].             Hamilton Valerio MD  03/31/24 2629

## 2024-03-28 ENCOUNTER — TELEPHONE (OUTPATIENT)
Dept: FAMILY MEDICINE | Facility: CLINIC | Age: 59
End: 2024-03-28
Payer: MEDICARE

## 2024-03-28 DIAGNOSIS — K62.5 RECTAL BLEEDING: Primary | ICD-10-CM

## 2024-03-28 LAB
OHS QRS DURATION: 78 MS
OHS QTC CALCULATION: 454 MS

## 2024-03-28 NOTE — TELEPHONE ENCOUNTER
Pt states that he is having blood in his stool and he wanted a appointment. Pt also states that he has prostate cancer and stated that he had a CT can on yesterday and they did not see anything that would be causing the blood in his stool. Pt would like colonoscopy orders placed if his provider feels that is best. Please advise further.

## 2024-03-28 NOTE — TELEPHONE ENCOUNTER
----- Message from Diane Metcalf sent at 3/28/2024  8:34 AM CDT -----  Regarding: self [phone]  Type: Patient Call Back    Who called: self     What is the request in detail:Symptom: Stools - Blood Mixed In  Outcome: Schedule a same-day appointment or talk to a nurse or provider within 1 hour.  Reason: Just a small amount of blood and patient feels normal (acts normal)    Can the clinic reply by [facility]? No     Would the patient rather a call back or a response via [facility]?  Call back     Best call back number: [phone]

## 2024-03-31 ENCOUNTER — HOSPITAL ENCOUNTER (OUTPATIENT)
Facility: HOSPITAL | Age: 59
Discharge: HOME | End: 2024-04-02
Attending: EMERGENCY MEDICINE | Admitting: INTERNAL MEDICINE
Payer: MEDICARE

## 2024-03-31 DIAGNOSIS — K92.2 ACUTE GASTROINTESTINAL BLEEDING: Primary | ICD-10-CM

## 2024-03-31 DIAGNOSIS — E87.6 HYPOKALEMIA: ICD-10-CM

## 2024-03-31 DIAGNOSIS — D64.9 ACUTE ANEMIA: ICD-10-CM

## 2024-03-31 PROBLEM — I10 HYPERTENSION: Status: ACTIVE | Noted: 2024-03-31

## 2024-03-31 LAB
ABO + RH BLD: NORMAL
ALBUMIN SERPL BCP-MCNC: 2.2 G/DL (ref 3.5–5.2)
ALBUMIN SERPL BCP-MCNC: 3.4 G/DL (ref 3.5–5.2)
ALP SERPL-CCNC: 60 U/L (ref 55–135)
ALT SERPL W/O P-5'-P-CCNC: 9 U/L (ref 10–44)
ANION GAP SERPL CALC-SCNC: 10 MMOL/L (ref 8–16)
ANION GAP SERPL CALC-SCNC: 5 MMOL/L (ref 8–16)
ANION GAP SERPL CALC-SCNC: 7 MMOL/L (ref 8–16)
AST SERPL-CCNC: 13 U/L (ref 10–40)
BASOPHILS # BLD AUTO: 0.01 K/UL (ref 0–0.2)
BASOPHILS # BLD AUTO: 0.03 K/UL (ref 0–0.2)
BASOPHILS # BLD AUTO: 0.03 K/UL (ref 0–0.2)
BASOPHILS NFR BLD: 0.2 % (ref 0–1.9)
BASOPHILS NFR BLD: 0.5 % (ref 0–1.9)
BASOPHILS NFR BLD: 0.5 % (ref 0–1.9)
BILIRUB SERPL-MCNC: 0.4 MG/DL (ref 0.1–1)
BILIRUB UR QL STRIP: NEGATIVE
BLD GP AB SCN CELLS X3 SERPL QL: NORMAL
BUN SERPL-MCNC: 12 MG/DL (ref 6–20)
BUN SERPL-MCNC: 14 MG/DL (ref 6–20)
BUN SERPL-MCNC: 15 MG/DL (ref 6–20)
CALCIUM SERPL-MCNC: 6 MG/DL (ref 8.7–10.5)
CALCIUM SERPL-MCNC: 7 MG/DL (ref 8.7–10.5)
CALCIUM SERPL-MCNC: 9.5 MG/DL (ref 8.7–10.5)
CHLORIDE SERPL-SCNC: 104 MMOL/L (ref 95–110)
CHLORIDE SERPL-SCNC: 118 MMOL/L (ref 95–110)
CHLORIDE SERPL-SCNC: 122 MMOL/L (ref 95–110)
CLARITY UR REFRACT.AUTO: CLEAR
CO2 SERPL-SCNC: 15 MMOL/L (ref 23–29)
CO2 SERPL-SCNC: 17 MMOL/L (ref 23–29)
CO2 SERPL-SCNC: 26 MMOL/L (ref 23–29)
COLOR UR AUTO: YELLOW
CREAT SERPL-MCNC: 0.5 MG/DL (ref 0.5–1.4)
CREAT SERPL-MCNC: 0.7 MG/DL (ref 0.5–1.4)
CREAT SERPL-MCNC: 0.8 MG/DL (ref 0.5–1.4)
DIFFERENTIAL METHOD BLD: ABNORMAL
EOSINOPHIL # BLD AUTO: 0.1 K/UL (ref 0–0.5)
EOSINOPHIL NFR BLD: 2.1 % (ref 0–8)
EOSINOPHIL NFR BLD: 2.3 % (ref 0–8)
EOSINOPHIL NFR BLD: 2.4 % (ref 0–8)
ERYTHROCYTE [DISTWIDTH] IN BLOOD BY AUTOMATED COUNT: 13.2 % (ref 11.5–14.5)
ERYTHROCYTE [DISTWIDTH] IN BLOOD BY AUTOMATED COUNT: 13.2 % (ref 11.5–14.5)
ERYTHROCYTE [DISTWIDTH] IN BLOOD BY AUTOMATED COUNT: 13.3 % (ref 11.5–14.5)
EST. GFR  (NO RACE VARIABLE): >60 ML/MIN/1.73 M^2
GLUCOSE SERPL-MCNC: 59 MG/DL (ref 70–110)
GLUCOSE SERPL-MCNC: 64 MG/DL (ref 70–110)
GLUCOSE SERPL-MCNC: 93 MG/DL (ref 70–110)
GLUCOSE UR QL STRIP: NEGATIVE
HCT VFR BLD AUTO: 24.8 % (ref 40–54)
HCT VFR BLD AUTO: 35 % (ref 40–54)
HCT VFR BLD AUTO: 38.8 % (ref 40–54)
HCV AB SERPL QL IA: NORMAL
HGB BLD-MCNC: 11.3 G/DL (ref 14–18)
HGB BLD-MCNC: 12.5 G/DL (ref 14–18)
HGB BLD-MCNC: 7.6 G/DL (ref 14–18)
HGB UR QL STRIP: NEGATIVE
HIV 1+2 AB+HIV1 P24 AG SERPL QL IA: NORMAL
IMM GRANULOCYTES # BLD AUTO: 0.01 K/UL (ref 0–0.04)
IMM GRANULOCYTES # BLD AUTO: 0.02 K/UL (ref 0–0.04)
IMM GRANULOCYTES # BLD AUTO: 0.02 K/UL (ref 0–0.04)
IMM GRANULOCYTES NFR BLD AUTO: 0.2 % (ref 0–0.5)
IMM GRANULOCYTES NFR BLD AUTO: 0.3 % (ref 0–0.5)
IMM GRANULOCYTES NFR BLD AUTO: 0.5 % (ref 0–0.5)
KETONES UR QL STRIP: NEGATIVE
LEUKOCYTE ESTERASE UR QL STRIP: NEGATIVE
LYMPHOCYTES # BLD AUTO: 1.1 K/UL (ref 1–4.8)
LYMPHOCYTES # BLD AUTO: 1.2 K/UL (ref 1–4.8)
LYMPHOCYTES # BLD AUTO: 1.4 K/UL (ref 1–4.8)
LYMPHOCYTES NFR BLD: 22.2 % (ref 18–48)
LYMPHOCYTES NFR BLD: 23.8 % (ref 18–48)
LYMPHOCYTES NFR BLD: 24.7 % (ref 18–48)
MAGNESIUM SERPL-MCNC: 1.3 MG/DL (ref 1.6–2.6)
MAGNESIUM SERPL-MCNC: 1.8 MG/DL (ref 1.6–2.6)
MCH RBC QN AUTO: 27.2 PG (ref 27–31)
MCH RBC QN AUTO: 27.4 PG (ref 27–31)
MCH RBC QN AUTO: 27.5 PG (ref 27–31)
MCHC RBC AUTO-ENTMCNC: 30.6 G/DL (ref 32–36)
MCHC RBC AUTO-ENTMCNC: 32.2 G/DL (ref 32–36)
MCHC RBC AUTO-ENTMCNC: 32.3 G/DL (ref 32–36)
MCV RBC AUTO: 84 FL (ref 82–98)
MCV RBC AUTO: 85 FL (ref 82–98)
MCV RBC AUTO: 90 FL (ref 82–98)
MONOCYTES # BLD AUTO: 0.4 K/UL (ref 0.3–1)
MONOCYTES # BLD AUTO: 0.6 K/UL (ref 0.3–1)
MONOCYTES # BLD AUTO: 0.7 K/UL (ref 0.3–1)
MONOCYTES NFR BLD: 10.7 % (ref 4–15)
MONOCYTES NFR BLD: 11.4 % (ref 4–15)
MONOCYTES NFR BLD: 9.4 % (ref 4–15)
NEUTROPHILS # BLD AUTO: 2.7 K/UL (ref 1.8–7.7)
NEUTROPHILS # BLD AUTO: 3.5 K/UL (ref 1.8–7.7)
NEUTROPHILS # BLD AUTO: 3.7 K/UL (ref 1.8–7.7)
NEUTROPHILS NFR BLD: 61.7 % (ref 38–73)
NEUTROPHILS NFR BLD: 63.1 % (ref 38–73)
NEUTROPHILS NFR BLD: 64 % (ref 38–73)
NITRITE UR QL STRIP: NEGATIVE
NRBC BLD-RTO: 0 /100 WBC
PH UR STRIP: 6 [PH] (ref 5–8)
PLATELET # BLD AUTO: 198 K/UL (ref 150–450)
PLATELET # BLD AUTO: 258 K/UL (ref 150–450)
PLATELET # BLD AUTO: 301 K/UL (ref 150–450)
PMV BLD AUTO: 10.3 FL (ref 9.2–12.9)
PMV BLD AUTO: 10.7 FL (ref 9.2–12.9)
PMV BLD AUTO: 11.3 FL (ref 9.2–12.9)
POCT GLUCOSE: 87 MG/DL (ref 70–110)
POCT GLUCOSE: 87 MG/DL (ref 70–110)
POTASSIUM SERPL-SCNC: 2.4 MMOL/L (ref 3.5–5.1)
POTASSIUM SERPL-SCNC: 2.9 MMOL/L (ref 3.5–5.1)
POTASSIUM SERPL-SCNC: 3.6 MMOL/L (ref 3.5–5.1)
PROT SERPL-MCNC: 4.8 G/DL (ref 6–8.4)
PROT UR QL STRIP: ABNORMAL
RBC # BLD AUTO: 2.76 M/UL (ref 4.6–6.2)
RBC # BLD AUTO: 4.13 M/UL (ref 4.6–6.2)
RBC # BLD AUTO: 4.6 M/UL (ref 4.6–6.2)
SODIUM SERPL-SCNC: 140 MMOL/L (ref 136–145)
SODIUM SERPL-SCNC: 142 MMOL/L (ref 136–145)
SODIUM SERPL-SCNC: 142 MMOL/L (ref 136–145)
SP GR UR STRIP: >1.03 (ref 1–1.03)
SPECIMEN OUTDATE: NORMAL
URN SPEC COLLECT METH UR: ABNORMAL
WBC # BLD AUTO: 4.25 K/UL (ref 3.9–12.7)
WBC # BLD AUTO: 5.5 K/UL (ref 3.9–12.7)
WBC # BLD AUTO: 6.05 K/UL (ref 3.9–12.7)

## 2024-03-31 PROCEDURE — 86901 BLOOD TYPING SEROLOGIC RH(D): CPT | Performed by: EMERGENCY MEDICINE

## 2024-03-31 PROCEDURE — 99285 EMERGENCY DEPT VISIT HI MDM: CPT | Mod: 25

## 2024-03-31 PROCEDURE — G0378 HOSPITAL OBSERVATION PER HR: HCPCS

## 2024-03-31 PROCEDURE — 86803 HEPATITIS C AB TEST: CPT | Performed by: PHYSICIAN ASSISTANT

## 2024-03-31 PROCEDURE — 25000003 PHARM REV CODE 250: Performed by: INTERNAL MEDICINE

## 2024-03-31 PROCEDURE — 80048 BASIC METABOLIC PNL TOTAL CA: CPT | Mod: 91,XB | Performed by: INTERNAL MEDICINE

## 2024-03-31 PROCEDURE — 83735 ASSAY OF MAGNESIUM: CPT | Performed by: PHYSICIAN ASSISTANT

## 2024-03-31 PROCEDURE — 36415 COLL VENOUS BLD VENIPUNCTURE: CPT | Performed by: INTERNAL MEDICINE

## 2024-03-31 PROCEDURE — 80053 COMPREHEN METABOLIC PANEL: CPT | Performed by: EMERGENCY MEDICINE

## 2024-03-31 PROCEDURE — 93005 ELECTROCARDIOGRAM TRACING: CPT

## 2024-03-31 PROCEDURE — 82962 GLUCOSE BLOOD TEST: CPT

## 2024-03-31 PROCEDURE — 87389 HIV-1 AG W/HIV-1&-2 AB AG IA: CPT | Performed by: PHYSICIAN ASSISTANT

## 2024-03-31 PROCEDURE — 81003 URINALYSIS AUTO W/O SCOPE: CPT | Performed by: EMERGENCY MEDICINE

## 2024-03-31 PROCEDURE — 82040 ASSAY OF SERUM ALBUMIN: CPT | Mod: 91 | Performed by: INTERNAL MEDICINE

## 2024-03-31 PROCEDURE — 93010 ELECTROCARDIOGRAM REPORT: CPT | Mod: ,,, | Performed by: INTERNAL MEDICINE

## 2024-03-31 PROCEDURE — 85025 COMPLETE CBC W/AUTO DIFF WBC: CPT | Mod: 91 | Performed by: INTERNAL MEDICINE

## 2024-03-31 PROCEDURE — 85025 COMPLETE CBC W/AUTO DIFF WBC: CPT | Performed by: EMERGENCY MEDICINE

## 2024-03-31 PROCEDURE — 80048 BASIC METABOLIC PNL TOTAL CA: CPT | Mod: XB | Performed by: PHYSICIAN ASSISTANT

## 2024-03-31 PROCEDURE — 83735 ASSAY OF MAGNESIUM: CPT | Mod: 91 | Performed by: INTERNAL MEDICINE

## 2024-03-31 PROCEDURE — 85025 COMPLETE CBC W/AUTO DIFF WBC: CPT | Mod: 91 | Performed by: PHYSICIAN ASSISTANT

## 2024-03-31 RX ORDER — LOSARTAN POTASSIUM 50 MG/1
50 TABLET ORAL DAILY
Status: DISCONTINUED | OUTPATIENT
Start: 2024-04-01 | End: 2024-03-31

## 2024-03-31 RX ORDER — SODIUM CHLORIDE 0.9 % (FLUSH) 0.9 %
10 SYRINGE (ML) INJECTION EVERY 12 HOURS PRN
Status: DISCONTINUED | OUTPATIENT
Start: 2024-03-31 | End: 2024-04-02 | Stop reason: HOSPADM

## 2024-03-31 RX ORDER — NALOXONE HCL 0.4 MG/ML
0.02 VIAL (ML) INJECTION
Status: DISCONTINUED | OUTPATIENT
Start: 2024-03-31 | End: 2024-04-02 | Stop reason: HOSPADM

## 2024-03-31 RX ORDER — IBUPROFEN 200 MG
16 TABLET ORAL
Status: DISCONTINUED | OUTPATIENT
Start: 2024-03-31 | End: 2024-04-02 | Stop reason: HOSPADM

## 2024-03-31 RX ORDER — POTASSIUM CHLORIDE 20 MEQ/1
40 TABLET, EXTENDED RELEASE ORAL ONCE
Status: DISCONTINUED | OUTPATIENT
Start: 2024-03-31 | End: 2024-03-31

## 2024-03-31 RX ORDER — ATORVASTATIN CALCIUM 10 MG/1
10 TABLET, FILM COATED ORAL NIGHTLY
Status: DISCONTINUED | OUTPATIENT
Start: 2024-03-31 | End: 2024-04-02 | Stop reason: HOSPADM

## 2024-03-31 RX ORDER — GLUCAGON 1 MG
1 KIT INJECTION
Status: DISCONTINUED | OUTPATIENT
Start: 2024-03-31 | End: 2024-04-02 | Stop reason: HOSPADM

## 2024-03-31 RX ORDER — CALCIUM GLUCONATE 20 MG/ML
1 INJECTION, SOLUTION INTRAVENOUS
Status: DISCONTINUED | OUTPATIENT
Start: 2024-03-31 | End: 2024-03-31

## 2024-03-31 RX ORDER — POTASSIUM CHLORIDE 7.45 MG/ML
10 INJECTION INTRAVENOUS ONCE
Status: DISCONTINUED | OUTPATIENT
Start: 2024-03-31 | End: 2024-03-31

## 2024-03-31 RX ORDER — POTASSIUM CHLORIDE 7.45 MG/ML
40 INJECTION INTRAVENOUS ONCE
Status: DISCONTINUED | OUTPATIENT
Start: 2024-03-31 | End: 2024-03-31

## 2024-03-31 RX ORDER — LOSARTAN POTASSIUM 50 MG/1
50 TABLET ORAL DAILY
Status: DISCONTINUED | OUTPATIENT
Start: 2024-03-31 | End: 2024-04-02 | Stop reason: HOSPADM

## 2024-03-31 RX ORDER — ONDANSETRON HYDROCHLORIDE 2 MG/ML
4 INJECTION, SOLUTION INTRAVENOUS EVERY 8 HOURS PRN
Status: DISCONTINUED | OUTPATIENT
Start: 2024-03-31 | End: 2024-04-02 | Stop reason: HOSPADM

## 2024-03-31 RX ORDER — IBUPROFEN 200 MG
24 TABLET ORAL
Status: DISCONTINUED | OUTPATIENT
Start: 2024-03-31 | End: 2024-04-02 | Stop reason: HOSPADM

## 2024-03-31 RX ADMIN — ATORVASTATIN CALCIUM 10 MG: 10 TABLET, FILM COATED ORAL at 08:03

## 2024-03-31 RX ADMIN — LOSARTAN POTASSIUM 50 MG: 50 TABLET, FILM COATED ORAL at 08:03

## 2024-03-31 NOTE — HPI
58-year-old gentleman past medical history of prostate cancer status post surgery, hypertension and hypercholesteremia was well until yesterday he experienced mild bleeding per rectum, decided to go to [facility] however did not receive much attention and opted to come to [facility] instead.  Lab work done at [facility] showed normal hemoglobin and electrolytes.  Patient denies any symptoms of anemia, only admits to a very mild headache.  Unable to quantify the amount of blood noted per rectum but reports that it was not a lot, it  also may have dripped down towards his penis.  He denies any abdominal pain, nausea vomiting or diarrhea.  Reports of minimal per rectal bleeding about a month ago, otherwise has never had this issue.  Outside hospital reports colonic diverticulosis on CT.  In the emergency room vital signs stable, mild hypertension.  Urinalysis unremarkable for hematuria.  It was likely that patient had lab errors as his hemoglobin was 7.6, profound hypokalemia and hypocalcemia in addition to hypoglycemia, thereafter repeat point of care glucose read 87(he did not receive any food/glucose).

## 2024-03-31 NOTE — ED NOTES
Tele box applied to pt. Tech in war room states able to see pt on monitor, rhythm NSR with HR 79.

## 2024-03-31 NOTE — PLAN OF CARE
SW attempted to complete assessment; medical team with patient.      SW/CM to follow-up      Mary Flores CD, MSW, LMSW, RSW   Case Management  [facility]  Email: [email or web address]

## 2024-03-31 NOTE — ED TRIAGE NOTES
Patient comes into the emergency department by POV with complaints of rectal/penile bleeding. Patient states that he noticed bright red blood when he wiped and in the toilet. Pt states he has a history of prostate cancer. Patient denies abdominal pain, weakness, diarrhea, nausea and vomiting.

## 2024-03-31 NOTE — ED PROVIDER NOTES
Emergency Department Provider Note    Randolph Blue   58 y.o. male   [id number]      3/31/2024       History     This history was obtained from the patient without limitations.      He is a 58-year-old with the below past medical history who presents by personal transportation.  He noticed gross blood from his rectum and penis during a bowel movement yesterday.  He denies penile trauma, penile pain, testicular enlargement and pain, abdominal pain, nausea, vomiting, diarrhea, and constipation. He was evaluated in the [facility] ED on 03/26 for chest pain.  He also complained of increased belching, increased flatus, and gross hematuria at the initiation of urination for 2 days.  EKG was normal.  Chest x-ray was negative for acute processes.  Troponin was normal.  CTA chest, abdomen and pelvis was performed and revealed colonic diverticulosis and a small fat-containing umbilical hernia.         Past Medical History:   Diagnosis Date    Arthritis     Bronchitis     Cervical arthritis     SILVIA (generalized anxiety disorder) 01/03/2014    GERD (gastroesophageal reflux disease)     Glaucoma     Hyperlipidemia, mixed 04/03/2014    Hypertension       Past Surgical History:   Procedure Laterality Date    KNEE SURGERY      bilateral    MANDIBLE FRACTURE SURGERY      PROSTATE SURGERY      SHOULDER SURGERY      [place] at [facility].       Family History   Problem Relation Age of Onset    Cancer Mother     Hypertension Mother     Heart disease Father     Hypertension Maternal Uncle     Hypertension Paternal Uncle     Hypertension Maternal Grandmother     Heart disease Paternal Grandmother     Hypertension Paternal Grandmother       Social History     Socioeconomic History    Marital status: Single   Tobacco Use    Smoking status: Never    Smokeless tobacco: Never   Substance and Sexual Activity    Alcohol use: Not Currently     Alcohol/week: 3.3 - 4.2 standard drinks of alcohol     Types: 4 - 5 Standard drinks or  equivalent per week     Comment: No alcohol x 2 weeks    Drug use: No    Sexual activity: Not Currently      Review of patient's allergies indicates:   Allergen Reactions    Percocet [oxycodone-acetaminophen]      Other reaction(s): Rash           Physical Examination     Initial Vitals [03/31/24 1304]   BP Pulse Resp Temp SpO2   124/75 74 18 98 °F (36.7 °C) 97 %      MAP       --           Physical Exam    Nursing note and vitals reviewed.  Constitutional: He is not diaphoretic. No distress.   Eyes: Conjunctivae are normal. No scleral icterus.   Cardiovascular:  Normal rate, regular rhythm and normal heart sounds.           No murmur heard.  Pulmonary/Chest: No respiratory distress. He has no wheezes. He has no rhonchi.   Abdominal: Abdomen is protuberant. There is no abdominal tenderness. Hernia confirmed negative in the right inguinal area and confirmed negative in the left inguinal area.   Genitourinary:    Testes normal.   Rectum:      Guaiac result positive.      No anal fissure, tenderness, external hemorrhoid or internal hemorrhoid.   Guaiac positive stool. : Acceptable.Uncircumcised. No phimosis, paraphimosis, penile erythema or penile tenderness. No discharge found.     Lymphadenopathy: No inguinal adenopathy noted on the right or left side.   Neurological: He is alert and oriented to person, place, and time. GCS score is 15. GCS eye subscore is 4. GCS verbal subscore is 5. GCS motor subscore is 6.   Skin: Skin is warm and dry. No pallor.            Labs     Labs Reviewed   CBC W/ AUTO DIFFERENTIAL - Abnormal; Notable for the following components:       Result Value    RBC 2.76 (*)     Hemoglobin 7.6 (*)     Hematocrit 24.8 (*)     MCHC 30.6 (*)     All other components within normal limits   COMPREHENSIVE METABOLIC PANEL - Abnormal; Notable for the following components:    Potassium 2.4 (*)     Chloride 122 (*)     CO2 15 (*)     Glucose 59 (*)     Calcium 6.0 (*)     Total Protein 4.8  (*)     Albumin 2.2 (*)     ALT 9 (*)     Anion Gap 5 (*)     All other components within normal limits   URINALYSIS, REFLEX TO URINE CULTURE - Abnormal; Notable for the following components:    Specific Gravity, UA >1.030 (*)     Protein, UA Trace (*)     All other components within normal limits    Narrative:     Specimen Source->Urine   HIV 1 / 2 ANTIBODY    Narrative:     Release to patient->Immediate   HEPATITIS C ANTIBODY    Narrative:     Release to patient->Immediate   BASIC METABOLIC PANEL   CBC W/ AUTO DIFFERENTIAL   MAGNESIUM   BASIC METABOLIC PANEL   TYPE & SCREEN        Imaging     Imaging Results    None           ED Course     The patient received the following medications:  Medications - No data to display      Procedures    ED Course as of 03/31/24 1634   Sun Mar 31, 2024   1538 WBC: 4.25 [LP]   1538 Hemoglobin(!): 7.6  Hemoglobin was 13.0 five days ago. [LP]   1538 Hematocrit(!): 24.8 [LP]   1538 Platelet Count: 198 [LP]   1538 Potassium(!!): 2.4 [LP]   1539 Chloride(!): 122 [LP]   1539 CO2(!): 15 [LP]   1539 Glucose(!): 59 [LP]   1539 Calcium(!!): 6.0 [LP]   1539 PROTEIN TOTAL(!): 4.8 [LP]   1539 Albumin(!): 2.2 [LP]   1616 EKG 12-lead  Independently interpreted by me:   Normal sinus rhythm. Ventricular rate 73 bpm.  Normal axis.  Normal QRS duration and QT interval.  No ST segment elevation or depression.  Normal T-wave morphology. Overall impression:  Normal EKG. [LP]      ED Course User Index  [LP] Peter Brand III, MD        Medical Decision Making                 Medical Decision Making  Considered lower GI bleed, likely secondary to diverticulosis discovered on CTA chest, abdomen, and pelvis a few days ago.  He had a benign abdomen.  Labs revealed drop in hemoglobin from a few days ago as well as profound hypokalemia, hypoglycemia, and hypocalcemia.  Type and screen ordered.  I suspect some of these are errors due to his lack of symptoms. BMP ordered to confirm. PO and IV potassium  ordered. Discussed his presentation, exam, lab results and overview of recent visit to the [facility] ED with the on-call  provider, Dr. Galeas. Patient admitted to  for further management.    Amount and/or Complexity of Data Reviewed  Labs: ordered. Decision-making details documented in ED Course.  ECG/medicine tests:  Decision-making details documented in ED Course.    Risk  Prescription drug management.              Diagnoses       ICD-10-CM ICD-9-CM   1. Acute gastrointestinal bleeding  K92.2 578.9   2. Acute anemia  D64.9 285.9   3. Hypokalemia  E87.6 276.8         Dispostion      ED Disposition Condition    Observation              Peter Brand III, MD  03/31/24 2222

## 2024-03-31 NOTE — NURSING
Nurses Note -- 4 Eyes      3/31/2024   6:23 PM      Skin assessed during: Admit      [x] No Altered Skin Integrity Present    [x]Prevention Measures Documented      [] Yes- Altered Skin Integrity Present or Discovered   [] LDA Added if Not in Epic (Describe Wound)   [] New Altered Skin Integrity was Present on Admit and Documented in LDA   [] Wound Image Taken    Wound Care Consulted? No    Attending Nurse:  HOMA Rader    Second RN/Staff Member:   HOMA Fraire

## 2024-03-31 NOTE — H&P
[facility] - Emergency Dept  [facility]  History & Physical    Patient Name: Randolph Blue  MRN: [id number]  Patient Class: OP- Observation  Admission Date: 3/31/2024  Attending Physician: Shaheen Clayton MD   Primary Care Provider: Cee Solo DO         Patient information was obtained from patient, past medical records, and ER records.     Subjective:     Principal Problem:GI bleed    Chief Complaint:   Chief Complaint   Patient presents with    Penile Bleeding    Rectal Bleeding     States blood dripping from penis Wednesday, Thursday, Saturday.  Had prostate CA and surgery September 2021        HPI: 58-year-old gentleman past medical history of prostate cancer status post surgery, hypertension and hypercholesteremia was well until yesterday he experienced mild bleeding per rectum, decided to go to [facility] however did not receive much attention and opted to come to [facility] instead.  Lab work done at [facility] showed normal hemoglobin and electrolytes.  Patient denies any symptoms of anemia, only admits to a very mild headache.  Unable to quantify the amount of blood noted per rectum but reports that it was not a lot, it  also may have dripped down towards his penis.  He denies any abdominal pain, nausea vomiting or diarrhea.  Reports of minimal per rectal bleeding about a month ago, otherwise has never had this issue.  Outside hospital reports colonic diverticulosis on CT.  In the emergency room vital signs stable, mild hypertension.  Urinalysis unremarkable for hematuria.  It was likely that patient had lab errors as his hemoglobin was 7.6, profound hypokalemia and hypocalcemia in addition to hypoglycemia, thereafter repeat point of care glucose read 87(he did not receive any food/glucose).    Past Medical History:   Diagnosis Date    Arthritis     Bronchitis     Cervical arthritis     SILVIA (generalized anxiety disorder) 01/03/2014    GERD (gastroesophageal reflux disease)     Glaucoma     Hyperlipidemia, mixed  04/03/2014    Hypertension        Past Surgical History:   Procedure Laterality Date    KNEE SURGERY      bilateral    MANDIBLE FRACTURE SURGERY      PROSTATE SURGERY      SHOULDER SURGERY      [place] at [facility].        Review of patient's allergies indicates:   Allergen Reactions    Percocet [oxycodone-acetaminophen]      Other reaction(s): Rash       No current facility-administered medications on file prior to encounter.     Current Outpatient Medications on File Prior to Encounter   Medication Sig    acetaminophen (TYLENOL) 500 MG tablet Take 1 tablet (500 mg total) by mouth every 4 (four) hours as needed for Pain.    albuterol (PROVENTIL/VENTOLIN HFA) 90 mcg/actuation inhaler Inhale 1-2 puffs into the lungs every 4 (four) hours as needed for Wheezing.    amitriptyline (ELAVIL) 100 MG tablet Take 1 tablet (100 mg total) by mouth nightly as needed for Insomnia.    chlorhexidine (PERIDEX) 0.12 % solution     FLUoxetine 40 MG capsule Take 1 capsule (40 mg total) by mouth once daily.    fluticasone propionate (FLONASE) 50 mcg/actuation nasal spray 1 spray (50 mcg total) by Each Nostril route 2 (two) times daily as needed for Rhinitis or Allergies.    hydroCHLOROthiazide (HYDRODIURIL) 25 MG tablet Take 1 tablet (25 mg total) by mouth once daily.    latanoprost 0.005 % ophthalmic solution     leuprolide acetate, 6 month, (ELIGARD) 45 mg injection Inject 45 mg into the skin every 6 (six) months.    leuprolide, 6 month, (ELIGARD) 45 mg injection Inject 45 mg into the skin.    loratadine (CLARITIN) 10 mg tablet TAKE 1 TABLET BY MOUTH ONCE DAILY AS NEEDED FOR ALLERGIES OR RUNNY NOSE    losartan (COZAAR) 50 MG tablet Take 1 tablet (50 mg total) by mouth once daily.    LUPRON DEPOT, 6 MONTH, 45 mg SyKt injection Inject into the muscle.    meclizine (ANTIVERT) 12.5 mg tablet Take 1 tablet (12.5 mg total) by mouth 3 (three) times daily as needed for Dizziness. (Patient not taking: Reported on 1/26/2024)     meloxicam (MOBIC) 15 MG tablet Take 15 mg by mouth once daily.    miscellaneous medical supply (C-TUB) Misc vacuum erection device    montelukast (SINGULAIR) 10 mg tablet Take 1 tablet (10 mg total) by mouth every evening. (Patient not taking: Reported on 1/26/2024)    mupirocin (BACTROBAN) 2 % ointment Apply topically 3 (three) times daily. (Patient not taking: Reported on 1/26/2024)    mv-min/folic/K1/lycopen/lutein (CENTRUM MEN 50 PLUS MINIS ORAL) Take by mouth.    naproxen (NAPROSYN) 500 MG tablet Take 1 tablet by mouth 2 (two) times daily with meals.    oxyCODONE (OXY-IR) 5 mg Cap Take 5 mg by mouth every 4 (four) hours as needed for Pain.    potassium chloride SA (K-DUR,KLOR-CON) 20 MEQ tablet Take 1 tablet (20 mEq total) by mouth once daily. (Patient not taking: Reported on 1/26/2024)    promethazine (PHENERGAN) 25 MG tablet Take 25 mg by mouth every 6 (six) hours as needed.    rosuvastatin (CRESTOR) 10 MG tablet Take 1 tablet (10 mg total) by mouth every evening.     Family History       Problem Relation (Age of Onset)    Cancer Mother    Heart disease Father, Paternal Grandmother    Hypertension Mother, Maternal Uncle, Paternal Uncle, Maternal Grandmother, Paternal Grandmother          Tobacco Use    Smoking status: Never    Smokeless tobacco: Never   Substance and Sexual Activity    Alcohol use: Not Currently     Alcohol/week: 3.3 - 4.2 standard drinks of alcohol     Types: 4 - 5 Standard drinks or equivalent per week     Comment: No alcohol x 2 weeks    Drug use: No    Sexual activity: Not Currently     Review of Systems   Respiratory:  Negative for shortness of breath.    Cardiovascular:  Negative for chest pain.   Gastrointestinal:  Positive for blood in stool. Negative for abdominal pain.   Neurological:  Positive for headaches.     Objective:     Vital Signs (Most Recent):  Temp: 98 °F (36.7 °C) (03/31/24 1304)  Pulse: 69 (03/31/24 1648)  Resp: 18 (03/31/24 1304)  BP: (!) 155/91 (03/31/24 1648)  SpO2:  97 % (03/31/24 1648) Vital Signs (24h Range):  Temp:  [98 °F (36.7 °C)] 98 °F (36.7 °C)  Pulse:  [67-74] 69  Resp:  [18] 18  SpO2:  [95 %-97 %] 97 %  BP: (124-155)/() 155/91     Weight: 115.7 kg (255 lb)  Body mass index is 39.94 kg/m².     Physical Exam  Constitutional:       General: He is not in acute distress.     Appearance: He is obese.   HENT:      Head: Normocephalic.      Right Ear: External ear normal.      Left Ear: External ear normal.      Nose: Nose normal.   Eyes:      Conjunctiva/sclera: Conjunctivae normal.   Cardiovascular:      Rate and Rhythm: Normal rate.   Pulmonary:      Effort: Pulmonary effort is normal.   Abdominal:      Palpations: Abdomen is soft.      Tenderness: There is no abdominal tenderness.      Comments: Scars noted to abdomen   Musculoskeletal:      Right lower leg: No edema.      Left lower leg: No edema.   Skin:     General: Skin is warm.   Neurological:      Mental Status: He is alert and oriented to person, place, and time.                    Significant Imaging: I have reviewed all pertinent imaging results/findings within the past 24 hours.  Assessment/Plan:     * GI bleed  Patient presented with per rectal bleed and recent CT reports of colonic diverticulosis, reported painless bleed.  Labs at outside hospital yesterday showed normal hemoglobin, however today hemoglobin of 7.6 despite any further bleed in addition to hypokalemia, hypocalcemia and hypoglycemia, it is very likely that there was a lab error and therefore labs were repeated.  We will continue to monitor patient clinically for LA bleed and hemoglobin.  GI consult.      Hypertension  Resume home medication    Hyperlipidemia, mixed  Resume home medication        VTE Risk Mitigation (From admission, onward)           Ordered     IP VTE HIGH RISK PATIENT  Once         03/31/24 1709     Place sequential compression device  Until discontinued         03/31/24 1709                       On 03/31/2024, patient  should be placed in hospital observation services under my care.             Shaheen Clayton MD  Department of Hospital Medicine  [facility] - Emergency Dept

## 2024-03-31 NOTE — SUBJECTIVE & OBJECTIVE
Past Medical History:   Diagnosis Date    Arthritis     Bronchitis     Cervical arthritis     SILVIA (generalized anxiety disorder) 01/03/2014    GERD (gastroesophageal reflux disease)     Glaucoma     Hyperlipidemia, mixed 04/03/2014    Hypertension        Past Surgical History:   Procedure Laterality Date    KNEE SURGERY      bilateral    MANDIBLE FRACTURE SURGERY      PROSTATE SURGERY      SHOULDER SURGERY      [place] at [facility], [place].        Review of patient's allergies indicates:   Allergen Reactions    Percocet [oxycodone-acetaminophen]      Other reaction(s): Rash       No current facility-administered medications on file prior to encounter.     Current Outpatient Medications on File Prior to Encounter   Medication Sig    acetaminophen (TYLENOL) 500 MG tablet Take 1 tablet (500 mg total) by mouth every 4 (four) hours as needed for Pain.    albuterol (PROVENTIL/VENTOLIN HFA) 90 mcg/actuation inhaler Inhale 1-2 puffs into the lungs every 4 (four) hours as needed for Wheezing.    amitriptyline (ELAVIL) 100 MG tablet Take 1 tablet (100 mg total) by mouth nightly as needed for Insomnia.    chlorhexidine (PERIDEX) 0.12 % solution     FLUoxetine 40 MG capsule Take 1 capsule (40 mg total) by mouth once daily.    fluticasone propionate (FLONASE) 50 mcg/actuation nasal spray 1 spray (50 mcg total) by Each Nostril route 2 (two) times daily as needed for Rhinitis or Allergies.    hydroCHLOROthiazide (HYDRODIURIL) 25 MG tablet Take 1 tablet (25 mg total) by mouth once daily.    latanoprost 0.005 % ophthalmic solution     leuprolide acetate, 6 month, (ELIGARD) 45 mg injection Inject 45 mg into the skin every 6 (six) months.    leuprolide, 6 month, (ELIGARD) 45 mg injection Inject 45 mg into the skin.    loratadine (CLARITIN) 10 mg tablet TAKE 1 TABLET BY MOUTH ONCE DAILY AS NEEDED FOR ALLERGIES OR RUNNY NOSE    losartan (COZAAR) 50 MG tablet Take 1 tablet (50 mg total) by mouth once daily.    LUPRON DEPOT, 6 MONTH, 45  mg SyKt injection Inject into the muscle.    meclizine (ANTIVERT) 12.5 mg tablet Take 1 tablet (12.5 mg total) by mouth 3 (three) times daily as needed for Dizziness. (Patient not taking: Reported on 1/26/2024)    meloxicam (MOBIC) 15 MG tablet Take 15 mg by mouth once daily.    miscellaneous medical supply (C-TUB) Misc vacuum erection device    montelukast (SINGULAIR) 10 mg tablet Take 1 tablet (10 mg total) by mouth every evening. (Patient not taking: Reported on 1/26/2024)    mupirocin (BACTROBAN) 2 % ointment Apply topically 3 (three) times daily. (Patient not taking: Reported on 1/26/2024)    mv-min/folic/K1/lycopen/lutein (CENTRUM MEN 50 PLUS MINIS ORAL) Take by mouth.    naproxen (NAPROSYN) 500 MG tablet Take 1 tablet by mouth 2 (two) times daily with meals.    oxyCODONE (OXY-IR) 5 mg Cap Take 5 mg by mouth every 4 (four) hours as needed for Pain.    potassium chloride SA (K-DUR,KLOR-CON) 20 MEQ tablet Take 1 tablet (20 mEq total) by mouth once daily. (Patient not taking: Reported on 1/26/2024)    promethazine (PHENERGAN) 25 MG tablet Take 25 mg by mouth every 6 (six) hours as needed.    rosuvastatin (CRESTOR) 10 MG tablet Take 1 tablet (10 mg total) by mouth every evening.     Family History       Problem Relation (Age of Onset)    Cancer Mother    Heart disease Father, Paternal Grandmother    Hypertension Mother, Maternal Uncle, Paternal Uncle, Maternal Grandmother, Paternal Grandmother          Tobacco Use    Smoking status: Never    Smokeless tobacco: Never   Substance and Sexual Activity    Alcohol use: Not Currently     Alcohol/week: 3.3 - 4.2 standard drinks of alcohol     Types: 4 - 5 Standard drinks or equivalent per week     Comment: No alcohol x 2 weeks    Drug use: No    Sexual activity: Not Currently     Review of Systems   Respiratory:  Negative for shortness of breath.    Cardiovascular:  Negative for chest pain.   Gastrointestinal:  Positive for blood in stool. Negative for abdominal pain.    Neurological:  Positive for headaches.     Objective:     Vital Signs (Most Recent):  Temp: 98 °F (36.7 °C) (03/31/24 1304)  Pulse: 69 (03/31/24 1648)  Resp: 18 (03/31/24 1304)  BP: (!) 155/91 (03/31/24 1648)  SpO2: 97 % (03/31/24 1648) Vital Signs (24h Range):  Temp:  [98 °F (36.7 °C)] 98 °F (36.7 °C)  Pulse:  [67-74] 69  Resp:  [18] 18  SpO2:  [95 %-97 %] 97 %  BP: (124-155)/() 155/91     Weight: 115.7 kg (255 lb)  Body mass index is 39.94 kg/m².     Physical Exam  Constitutional:       General: He is not in acute distress.     Appearance: He is obese.   HENT:      Head: Normocephalic.      Right Ear: External ear normal.      Left Ear: External ear normal.      Nose: Nose normal.   Eyes:      Conjunctiva/sclera: Conjunctivae normal.   Cardiovascular:      Rate and Rhythm: Normal rate.   Pulmonary:      Effort: Pulmonary effort is normal.   Abdominal:      Palpations: Abdomen is soft.      Tenderness: There is no abdominal tenderness.      Comments: Scars noted to abdomen   Musculoskeletal:      Right lower leg: No edema.      Left lower leg: No edema.   Skin:     General: Skin is warm.   Neurological:      Mental Status: He is alert and oriented to person, place, and time.                    Significant Imaging: I have reviewed all pertinent imaging results/findings within the past 24 hours.

## 2024-04-01 LAB
ANION GAP SERPL CALC-SCNC: 7 MMOL/L (ref 8–16)
BASOPHILS # BLD AUTO: 0.03 K/UL (ref 0–0.2)
BASOPHILS NFR BLD: 0.6 % (ref 0–1.9)
BUN SERPL-MCNC: 14 MG/DL (ref 6–20)
CALCIUM SERPL-MCNC: 9.2 MG/DL (ref 8.7–10.5)
CHLORIDE SERPL-SCNC: 106 MMOL/L (ref 95–110)
CO2 SERPL-SCNC: 24 MMOL/L (ref 23–29)
CREAT SERPL-MCNC: 0.8 MG/DL (ref 0.5–1.4)
DIFFERENTIAL METHOD BLD: ABNORMAL
EOSINOPHIL # BLD AUTO: 0.1 K/UL (ref 0–0.5)
EOSINOPHIL NFR BLD: 2 % (ref 0–8)
ERYTHROCYTE [DISTWIDTH] IN BLOOD BY AUTOMATED COUNT: 13.2 % (ref 11.5–14.5)
EST. GFR  (NO RACE VARIABLE): >60 ML/MIN/1.73 M^2
GLUCOSE SERPL-MCNC: 95 MG/DL (ref 70–110)
HCT VFR BLD AUTO: 35.9 % (ref 40–54)
HGB BLD-MCNC: 11.7 G/DL (ref 14–18)
IMM GRANULOCYTES # BLD AUTO: 0.02 K/UL (ref 0–0.04)
IMM GRANULOCYTES NFR BLD AUTO: 0.4 % (ref 0–0.5)
LYMPHOCYTES # BLD AUTO: 1.1 K/UL (ref 1–4.8)
LYMPHOCYTES NFR BLD: 20.7 % (ref 18–48)
MCH RBC QN AUTO: 27.7 PG (ref 27–31)
MCHC RBC AUTO-ENTMCNC: 32.6 G/DL (ref 32–36)
MCV RBC AUTO: 85 FL (ref 82–98)
MONOCYTES # BLD AUTO: 0.6 K/UL (ref 0.3–1)
MONOCYTES NFR BLD: 10.5 % (ref 4–15)
NEUTROPHILS # BLD AUTO: 3.6 K/UL (ref 1.8–7.7)
NEUTROPHILS NFR BLD: 65.8 % (ref 38–73)
NRBC BLD-RTO: 0 /100 WBC
OHS QRS DURATION: 78 MS
OHS QTC CALCULATION: 469 MS
PLATELET # BLD AUTO: 281 K/UL (ref 150–450)
PMV BLD AUTO: 10.8 FL (ref 9.2–12.9)
POTASSIUM SERPL-SCNC: 3.6 MMOL/L (ref 3.5–5.1)
RBC # BLD AUTO: 4.23 M/UL (ref 4.6–6.2)
SODIUM SERPL-SCNC: 137 MMOL/L (ref 136–145)
WBC # BLD AUTO: 5.45 K/UL (ref 3.9–12.7)

## 2024-04-01 PROCEDURE — 85025 COMPLETE CBC W/AUTO DIFF WBC: CPT | Performed by: INTERNAL MEDICINE

## 2024-04-01 PROCEDURE — G0378 HOSPITAL OBSERVATION PER HR: HCPCS

## 2024-04-01 PROCEDURE — 25000003 PHARM REV CODE 250: Performed by: INTERNAL MEDICINE

## 2024-04-01 PROCEDURE — 99204 OFFICE O/P NEW MOD 45 MIN: CPT | Mod: GC,,, | Performed by: STUDENT IN AN ORGANIZED HEALTH CARE EDUCATION/TRAINING PROGRAM

## 2024-04-01 PROCEDURE — 80048 BASIC METABOLIC PNL TOTAL CA: CPT | Performed by: INTERNAL MEDICINE

## 2024-04-01 PROCEDURE — 36415 COLL VENOUS BLD VENIPUNCTURE: CPT | Performed by: INTERNAL MEDICINE

## 2024-04-01 RX ADMIN — ATORVASTATIN CALCIUM 10 MG: 10 TABLET, FILM COATED ORAL at 09:04

## 2024-04-01 RX ADMIN — LOSARTAN POTASSIUM 50 MG: 50 TABLET, FILM COATED ORAL at 08:04

## 2024-04-01 NOTE — H&P (VIEW-ONLY)
[facility]  Gastroenterology  Consult Note    Patient Name: Randolph Blue  MRN: [id number]  Admission Date: 3/31/2024  Hospital Length of Stay: 0 days  Code Status: Full Code   Attending Provider: Shaheen Clayton MD   Consulting Provider: Osmar Norton MD  Primary Care Physician: Cee Solo DO  Principal Problem:GI bleed    Inpatient consult to Gastroenterology  Consult performed by: Osmar Norton MD  Consult ordered by: Shaheen Clayton MD        Subjective:     HPI: Randolph Blue is a 58 y.o. male with history of prostate cancer s/p surgery and radiation in 2021 who presents for BRBPR. Occurred over past few days. Also thinks he noticed blood coming from his penis. Initially went to [facility] but left and came to [facility]. Hgb at [facility] on 3/30 was 12.3 which is his baseline. Hgb here is 11.3 > 12.5 > 11.7. Had spurious lab draw of 7.6 while in ED.    Last colonoscopy in 2015 showed descending colon diverticulosis and internal hemorrhoids - planned 10 year repeat for screening    Hemodynamically stable    PSH: prostate    Objective:     Vitals:    04/01/24 1126   BP: 115/72   Pulse: 65   Resp:    Temp: 97.9 °F (36.6 °C)         Constitutional:  not in acute distress and well developed  HENT: Head: Normal, normocephalic.  Eyes: conjunctiva clear and sclera nonicteric  GI: soft, non-tender, without masses or organomegaly  Musculoskeletal: no muscular tenderness noted  Skin: normal color  Neurological: alert, oriented x3    Significant Labs:  Reviewed the following pertinent laboratory tests:   Hgb 11.7    Significant Imaging:  Imaging reviewed: CTA chest abdomen pelvis. Interpretation:    Colonic diverticulosis    Assessment/Plan:     Randolph Blue is a 58 y.o. male with history of prostate cancer s/p surgery and radiation who presents for BRBPR. Hgb stable and hemodynamically stable. Colonoscopy in 2015 showed diverticulosis and internal hemorrhoids. No family history of CRC. Will arrange for expedited  outpatient colonoscopy.    Problem List:  BRBPR    Recommendations:  - Will arrange for expedited outpatient colonoscopy  - Regular diet  - Transfuse to keep Hgb >7, plts >50    GI will sign off.      Thank you for involving us in the care of Randolph Blue. Please call with any additional questions, concerns or changes in the patient's clinical status.     Osmar Norton MD  Gastroenterology Fellow PGY V  [facility]

## 2024-04-01 NOTE — ASSESSMENT & PLAN NOTE
Patient presented with per rectal bleed and recent CT reports of colonic diverticulosis, reported painless bleed.  Labs at outside hospital yesterday showed normal hemoglobin, however today hemoglobin of 7.6 despite any further bleed in addition to hypokalemia, hypocalcemia and hypoglycemia, it is very likely that there was a lab error and therefore labs were repeated.  We will continue to monitor patient clinically for WA bleed and hemoglobin.  GI consult.  4/1  Initial labs were lab error.  Patient hemodynamically stable, no further reports of bleed, normal stool, hemoglobin stable.  He reports colonoscopy 9 years ago.  Awaiting GI consult.

## 2024-04-01 NOTE — PLAN OF CARE
Problem: Adult Inpatient Plan of Care  Goal: Plan of Care Review  Outcome: Ongoing, Progressing  Goal: Patient-Specific Goal (Individualized)  Outcome: Ongoing, Progressing  Goal: Absence of Hospital-Acquired Illness or Injury  Outcome: Ongoing, Progressing  Goal: Optimal Comfort and Wellbeing  Outcome: Ongoing, Progressing  Goal: Readiness for Transition of Care  Outcome: Ongoing, Progressing     Problem: Fluid and Electrolyte Imbalance (Acute Kidney Injury/Impairment)  Goal: Fluid and Electrolyte Balance  Outcome: Ongoing, Progressing     Problem: Oral Intake Inadequate (Acute Kidney Injury/Impairment)  Goal: Optimal Nutrition Intake  Outcome: Ongoing, Progressing     Problem: Renal Function Impairment (Acute Kidney Injury/Impairment)  Goal: Effective Renal Function  Outcome: Ongoing, Progressing   Pt AAO X 4; able to express needs.  No c/o pain during this shift.  New admit yesterday on day shift for bleeding from the rectum and penis yesterday.  Patient denies bleeding during this shift.  NPO since Midnight for possible testing this morning/  Safety maintained.  Bed in low position,  call  light in reach.

## 2024-04-01 NOTE — CONSULTS
[facility]  Gastroenterology  Consult Note    Patient Name: Randolph Blue  MRN: [id number]  Admission Date: 3/31/2024  Hospital Length of Stay: 0 days  Code Status: Full Code   Attending Provider: Shaheen Clayton MD   Consulting Provider: Osmar Norton MD  Primary Care Physician: Cee Solo DO  Principal Problem:GI bleed    Inpatient consult to Gastroenterology  Consult performed by: Osmar Norton MD  Consult ordered by: Shaheen Clayton MD        Subjective:     HPI: Randolph Blue is a 58 y.o. male with history of prostate cancer s/p surgery and radiation in 2021 who presents for BRBPR. Occurred over past few days. Also thinks he noticed blood coming from his penis. Initially went to [facility] but left and came to [facility]. Hgb at [facility] on 3/30 was 12.3 which is his baseline. Hgb here is 11.3 > 12.5 > 11.7. Had spurious lab draw of 7.6 while in ED.    Last colonoscopy in 2015 showed descending colon diverticulosis and internal hemorrhoids - planned 10 year repeat for screening    Hemodynamically stable    PSH: prostate    Objective:     Vitals:    04/01/24 1126   BP: 115/72   Pulse: 65   Resp:    Temp: 97.9 °F (36.6 °C)         Constitutional:  not in acute distress and well developed  HENT: Head: Normal, normocephalic.  Eyes: conjunctiva clear and sclera nonicteric  GI: soft, non-tender, without masses or organomegaly  Musculoskeletal: no muscular tenderness noted  Skin: normal color  Neurological: alert, oriented x3    Significant Labs:  Reviewed the following pertinent laboratory tests:   Hgb 11.7    Significant Imaging:  Imaging reviewed: CTA chest abdomen pelvis. Interpretation:    Colonic diverticulosis    Assessment/Plan:     Randolph Blue is a 58 y.o. male with history of prostate cancer s/p surgery and radiation who presents for BRBPR. Hgb stable and hemodynamically stable. Colonoscopy in 2015 showed diverticulosis and internal hemorrhoids. No family history of CRC. Will arrange for expedited  outpatient colonoscopy.    Problem List:  BRBPR    Recommendations:  - Will arrange for expedited outpatient colonoscopy  - Regular diet  - Transfuse to keep Hgb >7, plts >50    GI will sign off.      Thank you for involving us in the care of Randolph Blue. Please call with any additional questions, concerns or changes in the patient's clinical status.     Osmar Norton MD  Gastroenterology Fellow PGY V  [facility]

## 2024-04-01 NOTE — SUBJECTIVE & OBJECTIVE
Interval History:  No further reports of GI bleed, normal stool.  Patient reports he had a colonoscopy 9 years ago, no previous upper endoscopic    Review of Systems   All other systems reviewed and are negative.    Objective:     Vital Signs (Most Recent):  Temp: 97.9 °F (36.6 °C) (04/01/24 1126)  Pulse: 65 (04/01/24 1126)  Resp: 18 (04/01/24 0757)  BP: 115/72 (04/01/24 1126)  SpO2: (!) 94 % (04/01/24 1126) Vital Signs (24h Range):  Temp:  [97.6 °F (36.4 °C)-98.2 °F (36.8 °C)] 97.9 °F (36.6 °C)  Pulse:  [62-84] 65  Resp:  [18-20] 18  SpO2:  [80 %-98 %] 94 %  BP: (112-155)/() 115/72     Weight: 115.7 kg (255 lb)  Body mass index is 39.94 kg/m².    Intake/Output Summary (Last 24 hours) at 4/1/2024 1430  Last data filed at 4/1/2024 0744  Gross per 24 hour   Intake 240 ml   Output 500 ml   Net -260 ml         Physical Exam  Constitutional:       General: He is not in acute distress.     Appearance: He is obese.   HENT:      Head: Normocephalic.      Right Ear: External ear normal.      Left Ear: External ear normal.      Nose: Nose normal.   Eyes:      Conjunctiva/sclera: Conjunctivae normal.   Cardiovascular:      Rate and Rhythm: Normal rate.   Pulmonary:      Effort: Pulmonary effort is normal.   Abdominal:      Palpations: Abdomen is soft.      Tenderness: There is no abdominal tenderness.      Comments: Scars noted to abdomen   Musculoskeletal:      Right lower leg: No edema.      Left lower leg: No edema.   Skin:     General: Skin is warm.   Neurological:      Mental Status: He is alert and oriented to person, place, and time.         Significant Labs: All pertinent labs within the past 24 hours have been reviewed.

## 2024-04-01 NOTE — PROGRESS NOTES
[facility] ([address] Medicine  Progress Note    Patient Name: Randolph Blue  MRN: [id number]  Patient Class: OP- Observation   Admission Date: 3/31/2024  Length of Stay: 0 days  Attending Physician: Shaheen Clayton MD  Primary Care Provider: Cee Solo DO        Subjective:     Principal Problem:GI bleed        HPI:  58-year-old gentleman past medical history of prostate cancer status post surgery, hypertension and hypercholesteremia was well until yesterday he experienced mild bleeding per rectum, decided to go to [facility] however did not receive much attention and opted to come to [facility] instead.  Lab work done at [facility] showed normal hemoglobin and electrolytes.  Patient denies any symptoms of anemia, only admits to a very mild headache.  Unable to quantify the amount of blood noted per rectum but reports that it was not a lot, it  also may have dripped down towards his penis.  He denies any abdominal pain, nausea vomiting or diarrhea.  Reports of minimal per rectal bleeding about a month ago, otherwise has never had this issue.  Outside hospital reports colonic diverticulosis on CT.  In the emergency room vital signs stable, mild hypertension.  Urinalysis unremarkable for hematuria.  It was likely that patient had lab errors as his hemoglobin was 7.6, profound hypokalemia and hypocalcemia in addition to hypoglycemia, thereafter repeat point of care glucose read 87(he did not receive any food/glucose).    Overview/Hospital Course:  Admitted for lower GI bleed stable.    Interval History:  No further reports of GI bleed, normal stool.  Patient reports he had a colonoscopy 9 years ago, no previous upper endoscopic    Review of Systems   All other systems reviewed and are negative.    Objective:     Vital Signs (Most Recent):  Temp: 97.9 °F (36.6 °C) (04/01/24 1126)  Pulse: 65 (04/01/24 1126)  Resp: 18 (04/01/24 0757)  BP: 115/72 (04/01/24 1126)  SpO2: (!) 94 % (04/01/24 1126) Vital Signs (24h  Range):  Temp:  [97.6 °F (36.4 °C)-98.2 °F (36.8 °C)] 97.9 °F (36.6 °C)  Pulse:  [62-84] 65  Resp:  [18-20] 18  SpO2:  [80 %-98 %] 94 %  BP: (112-155)/() 115/72     Weight: 115.7 kg (255 lb)  Body mass index is 39.94 kg/m².    Intake/Output Summary (Last 24 hours) at 4/1/2024 1430  Last data filed at 4/1/2024 0744  Gross per 24 hour   Intake 240 ml   Output 500 ml   Net -260 ml         Physical Exam  Constitutional:       General: He is not in acute distress.     Appearance: He is obese.   HENT:      Head: Normocephalic.      Right Ear: External ear normal.      Left Ear: External ear normal.      Nose: Nose normal.   Eyes:      Conjunctiva/sclera: Conjunctivae normal.   Cardiovascular:      Rate and Rhythm: Normal rate.   Pulmonary:      Effort: Pulmonary effort is normal.   Abdominal:      Palpations: Abdomen is soft.      Tenderness: There is no abdominal tenderness.      Comments: Scars noted to abdomen   Musculoskeletal:      Right lower leg: No edema.      Left lower leg: No edema.   Skin:     General: Skin is warm.   Neurological:      Mental Status: He is alert and oriented to person, place, and time.         Significant Labs: All pertinent labs within the past 24 hours have been reviewed.      Assessment/Plan:      * GI bleed  Patient presented with per rectal bleed and recent CT reports of colonic diverticulosis, reported painless bleed.  Labs at outside hospital yesterday showed normal hemoglobin, however today hemoglobin of 7.6 despite any further bleed in addition to hypokalemia, hypocalcemia and hypoglycemia, it is very likely that there was a lab error and therefore labs were repeated.  We will continue to monitor patient clinically for ME bleed and hemoglobin.  GI consult.  4/1  Initial labs were lab error.  Patient hemodynamically stable, no further reports of bleed, normal stool, hemoglobin stable.  He reports colonoscopy 9 years ago.  Awaiting GI consult.      Hypertension  Resume home  medication    Hyperlipidemia, mixed  Resume home medication        VTE Risk Mitigation (From admission, onward)           Ordered     IP VTE HIGH RISK PATIENT  Once         03/31/24 1709     Place sequential compression device  Until discontinued         03/31/24 1709                    Discharge Planning   ZANE: 4/2/2024     Code Status: Full Code   Is the patient medically ready for discharge?:     Reason for patient still in hospital (select all that apply): Patient trending condition  Discharge Plan A: Home                  Shaheen Clayton MD  Department of Hospital Medicine   [facility]

## 2024-04-01 NOTE — PLAN OF CARE
[facility] ([place] [address])  Initial Discharge Assessment       Primary Care Provider: Cee Solo DO    Admission Diagnosis: Hypokalemia [E87.6]  Acute gastrointestinal bleeding [K92.2]  Acute anemia [D64.9]    Admission Date: 3/31/2024  Expected Discharge Date: 4/2/2024    Transition of Care Barriers: (P) None    Payor: HU[employer] MANAGED MEDICARE / Plan: [employer] SNP HMO PPO SPECIAL NEEDS / Product Type: Medicare Advantage /     Extended Emergency Contact Information  Primary Emergency Contact: MarlineJuanita  Address: [address] [place] [address]  Home Phone: [phone]  Mobile Phone: [phone]  Relation: Mother  Secondary Emergency Contact: Colleen Benitez  Mobile Phone: [phone]  Relation: Daughter    Discharge Plan A: (P) Home  Discharge Plan B: (P) Home with family      [facility] [facility] - [place] - [address] [facility]  [address] [facility] [facility] [address]  Phone: [phone] Fax: [phone]    [facility] - [place], [place] - [address]  [address]  [place] [address]  Phone: [phone] Fax: [phone]    [id number] - [place], [place] - [address]  [address]  [place] [address]  Phone: [phone] Fax: [phone]      Initial Assessment (most recent)       Adult Discharge Assessment - 04/01/24 1337          Discharge Assessment    Assessment Type Discharge Planning Assessment (P)      Confirmed/corrected address, phone number and insurance Yes (P)      Confirmed Demographics Correct on Facesheet (P)      Source of Information patient (P)      Does patient/caregiver understand observation status Yes (P)      Communicated ZANE with patient/caregiver Date not available/Unable to determine (P)      Reason For Admission GI bleed (P)      People in Home parent(s) (P)      Facility Arrived From: home (P)      Do you expect to return to your current living situation? Yes (P)      Do you have  help at home or someone to help you manage your care at home? Yes (P)      Who are your caregiver(s) and their phone number(s)? Mother Juanita Horan [phone] (P)      Prior to hospitilization cognitive status: Unable to Assess (P)      Current cognitive status: Alert/Oriented (P)      Walking or Climbing Stairs Difficulty no (P)      Dressing/Bathing Difficulty no (P)      Home Layout Able to live on 1st floor (P)      Equipment Currently Used at Home oxygen (P)      Readmission within 30 days? No (P)      Do you currently have service(s) that help you manage your care at home? No (P)      Do you take prescription medications? Yes (P)      Do you have prescription coverage? Yes (P)      Coverage [employer] (P)      Do you have any problems affording any of your prescribed medications? No (P)      Who is going to help you get home at discharge? girlfriend or dtr (P)      How do you get to doctors appointments? car, drives self (P)      Are you on dialysis? No (P)      Do you take coumadin? No (P)      Discharge Plan A Home (P)      Discharge Plan B Home with family (P)      DME Needed Upon Discharge  other (see comments) (P)    hose and mask for cPap - instructed that he needs to contact supplier - pt unsure of who is his supplier.    Discharge Plan discussed with: Patient (P)      Transition of Care Barriers None (P)         Physical Activity    On average, how many days per week do you engage in moderate to strenuous exercise (like a brisk walk)? 0 days (P)      On average, how many minutes do you engage in exercise at this level? 0 min (P)         Financial Resource Strain    How hard is it for you to pay for the very basics like food, housing, medical care, and heating? Not hard at all (P)         Housing Stability    In the last 12 months, was there a time when you were not able to pay the mortgage or rent on time? No (P)      In the last 12 months, how many places have you lived? 1 (P)      In the last 12  months, was there a time when you did not have a steady place to sleep or slept in a shelter (including now)? No (P)         Transportation Needs    In the past 12 months, has lack of transportation kept you from medical appointments or from getting medications? No (P)      In the past 12 months, has lack of transportation kept you from meetings, work, or from getting things needed for daily living? No (P)         Food Insecurity    Within the past 12 months, you worried that your food would run out before you got the money to buy more. Never true (P)      Within the past 12 months, the food you bought just didn't last and you didn't have money to get more. Never true (P)         Stress    Do you feel stress - tense, restless, nervous, or anxious, or unable to sleep at night because your mind is troubled all the time - these days? Not at all (P)         Social Connections    In a typical week, how many times do you talk on the phone with family, friends, or neighbors? More than three times a week (P)      How often do you get together with friends or relatives? More than three times a week (P)      How often do you attend Muslim or Jainism services? More than 4 times per year (P)      Do you belong to any clubs or organizations such as Muslim groups, unions, fraternal or athletic groups, or school groups? No (P)      How often do you attend meetings of the clubs or organizations you belong to? Never (P)      Are you , , , , never , or living with a partner? Never  (P)         Alcohol Use    Q1: How often do you have a drink containing alcohol? Never (P)      Q2: How many drinks containing alcohol do you have on a typical day when you are drinking? Patient does not drink (P)      Q3: How often do you have six or more drinks on one occasion? Never (P)         OTHER    Name(s) of People in Home Mother Juanita Horan [phone] (P)                  CM spoke with pt in  room.  Pt lives with mother in 1 story home, came from home.  Pt's girlfriend or his dtr will drive him home.  He drives self to MD appointments.  No 30D readmission.  No HH, coumadin or HD.  DME: none.  Pt independent with bathing and dressing.  Pharmacy [facility] [place] [address].    NIKI VeraN, BS, RN, CCM      Discharge Plan A and Plan B have been determined by review of patient's clinical status, future medical and therapeutic needs, and coverage/benefits for post-acute care in coordination with multidisciplinary team members.

## 2024-04-02 ENCOUNTER — TELEPHONE (OUTPATIENT)
Dept: ENDOSCOPY | Facility: HOSPITAL | Age: 59
End: 2024-04-02
Payer: MEDICARE

## 2024-04-02 VITALS — WEIGHT: 255.06 LBS | HEIGHT: 67 IN | BODY MASS INDEX: 40.03 KG/M2

## 2024-04-02 VITALS
TEMPERATURE: 98 F | OXYGEN SATURATION: 95 % | DIASTOLIC BLOOD PRESSURE: 77 MMHG | HEART RATE: 67 BPM | BODY MASS INDEX: 40.02 KG/M2 | HEIGHT: 67 IN | SYSTOLIC BLOOD PRESSURE: 130 MMHG | RESPIRATION RATE: 16 BRPM | WEIGHT: 255 LBS

## 2024-04-02 DIAGNOSIS — K62.5 RECTAL BLEEDING: Primary | ICD-10-CM

## 2024-04-02 PROBLEM — K92.2 GI BLEED: Status: RESOLVED | Noted: 2024-03-31 | Resolved: 2024-04-02

## 2024-04-02 LAB
ANION GAP SERPL CALC-SCNC: 8 MMOL/L (ref 8–16)
BASOPHILS # BLD AUTO: 0.02 K/UL (ref 0–0.2)
BASOPHILS NFR BLD: 0.3 % (ref 0–1.9)
BUN SERPL-MCNC: 13 MG/DL (ref 6–20)
CALCIUM SERPL-MCNC: 9.6 MG/DL (ref 8.7–10.5)
CHLORIDE SERPL-SCNC: 107 MMOL/L (ref 95–110)
CO2 SERPL-SCNC: 23 MMOL/L (ref 23–29)
CREAT SERPL-MCNC: 0.8 MG/DL (ref 0.5–1.4)
DIFFERENTIAL METHOD BLD: ABNORMAL
EOSINOPHIL # BLD AUTO: 0.1 K/UL (ref 0–0.5)
EOSINOPHIL NFR BLD: 1.4 % (ref 0–8)
ERYTHROCYTE [DISTWIDTH] IN BLOOD BY AUTOMATED COUNT: 13.1 % (ref 11.5–14.5)
EST. GFR  (NO RACE VARIABLE): >60 ML/MIN/1.73 M^2
GLUCOSE SERPL-MCNC: 92 MG/DL (ref 70–110)
HCT VFR BLD AUTO: 36.6 % (ref 40–54)
HGB BLD-MCNC: 11.9 G/DL (ref 14–18)
IMM GRANULOCYTES # BLD AUTO: 0.03 K/UL (ref 0–0.04)
IMM GRANULOCYTES NFR BLD AUTO: 0.5 % (ref 0–0.5)
LYMPHOCYTES # BLD AUTO: 1.1 K/UL (ref 1–4.8)
LYMPHOCYTES NFR BLD: 16.5 % (ref 18–48)
MCH RBC QN AUTO: 27.3 PG (ref 27–31)
MCHC RBC AUTO-ENTMCNC: 32.5 G/DL (ref 32–36)
MCV RBC AUTO: 84 FL (ref 82–98)
MONOCYTES # BLD AUTO: 0.7 K/UL (ref 0.3–1)
MONOCYTES NFR BLD: 9.9 % (ref 4–15)
NEUTROPHILS # BLD AUTO: 4.7 K/UL (ref 1.8–7.7)
NEUTROPHILS NFR BLD: 71.4 % (ref 38–73)
NRBC BLD-RTO: 0 /100 WBC
PLATELET # BLD AUTO: 299 K/UL (ref 150–450)
PMV BLD AUTO: 10.9 FL (ref 9.2–12.9)
POTASSIUM SERPL-SCNC: 3.5 MMOL/L (ref 3.5–5.1)
RBC # BLD AUTO: 4.36 M/UL (ref 4.6–6.2)
SODIUM SERPL-SCNC: 138 MMOL/L (ref 136–145)
WBC # BLD AUTO: 6.59 K/UL (ref 3.9–12.7)

## 2024-04-02 PROCEDURE — G0378 HOSPITAL OBSERVATION PER HR: HCPCS

## 2024-04-02 PROCEDURE — 85025 COMPLETE CBC W/AUTO DIFF WBC: CPT | Performed by: INTERNAL MEDICINE

## 2024-04-02 PROCEDURE — 80048 BASIC METABOLIC PNL TOTAL CA: CPT | Performed by: INTERNAL MEDICINE

## 2024-04-02 PROCEDURE — 36415 COLL VENOUS BLD VENIPUNCTURE: CPT | Performed by: INTERNAL MEDICINE

## 2024-04-02 PROCEDURE — 25000003 PHARM REV CODE 250: Performed by: INTERNAL MEDICINE

## 2024-04-02 RX ORDER — POLYETHYLENE GLYCOL 3350, SODIUM SULFATE ANHYDROUS, SODIUM BICARBONATE, SODIUM CHLORIDE, POTASSIUM CHLORIDE 236; 22.74; 6.74; 5.86; 2.97 G/4L; G/4L; G/4L; G/4L; G/4L
4 POWDER, FOR SOLUTION ORAL ONCE
Qty: 4000 ML | Refills: 0 | Status: SHIPPED | OUTPATIENT
Start: 2024-04-02 | End: 2024-04-02

## 2024-04-02 RX ADMIN — LOSARTAN POTASSIUM 50 MG: 50 TABLET, FILM COATED ORAL at 08:04

## 2024-04-02 NOTE — TELEPHONE ENCOUNTER
Referral for urgent colonoscopy received. Patient currently in hospital. Refendo placed to call patient after discharge.

## 2024-04-02 NOTE — DISCHARGE SUMMARY
[facility] ([place] [address])  [facility] Medicine  Discharge Summary      Patient Name: Randolph Blue  MRN: [id number]  Admission Date: 3/31/2024  Hospital Length of Stay: 0 days  Discharge Date and Time:  04/02/2024 8:50 AM  Attending Physician: Shaheen Clayton MD   Discharging Provider: Shaheen Clayton MD  Discharge Provider Team: [facility] MED D  Primary Care Provider: Cee Solo, DO        HPI: 58-year-old gentleman past medical history of prostate cancer status post surgery, hypertension and hypercholesteremia was well until yesterday he experienced mild bleeding per rectum, decided to go to [facility] however did not receive much attention and opted to come to [facility] instead.  Lab work done at [facility] showed normal hemoglobin and electrolytes.  Patient denies any symptoms of anemia, only admits to a very mild headache.  Unable to quantify the amount of blood noted per rectum but reports that it was not a lot, it  also may have dripped down towards his penis.  He denies any abdominal pain, nausea vomiting or diarrhea.  Reports of minimal per rectal bleeding about a month ago, otherwise has never had this issue.  Outside hospital reports colonic diverticulosis on CT.  In the emergency room vital signs stable, mild hypertension.  Urinalysis unremarkable for hematuria.  It was likely that patient had lab errors as his hemoglobin was 7.6, profound hypokalemia and hypocalcemia in addition to hypoglycemia, thereafter repeat point of care glucose read 87(he did not receive any food/glucose).    * No surgery found *      Hospital Course:  Patient was admitted and managed for GI bleed.  During hospital stay he had no GI bleed and had normal bowel movements.  His hemoglobin had no significant drop and was stable.  CT reports diverticular disease.  GI was consulted who recommended outpatient follow up and scoping.  Patient was discharged with referral to GI given.      Physical Exam  Constitutional:       General: He is not in acute  distress.     Appearance: He is obese.   HENT:      Head: Normocephalic.      Right Ear: External ear normal.      Left Ear: External ear normal.      Nose: Nose normal.   Eyes:      Conjunctiva/sclera: Conjunctivae normal.   Cardiovascular:      Rate and Rhythm: Normal rate.   Pulmonary:      Effort: Pulmonary effort is normal.   Abdominal:      Palpations: Abdomen is soft.      Tenderness: There is no abdominal tenderness.      Comments: Scars noted to abdomen   Musculoskeletal:      Right lower leg: No edema.      Left lower leg: No edema.   Skin:     General: Skin is warm.   Neurological:      Mental Status: He is alert and oriented to person, place, and time.     Consults:   Consults (From admission, onward)          Status Ordering Provider     Inpatient consult to Gastroenterology  Once        Provider:  (Not yet assigned)    ALEXANDRA Page            Final Active Diagnoses:    Diagnosis Date Noted POA    Hypertension [I10] 03/31/2024 Yes    Hyperlipidemia, mixed [E78.2] 04/03/2014 Yes      Problems Resolved During this Admission:    Diagnosis Date Noted Date Resolved POA    PRINCIPAL PROBLEM:  GI bleed [K92.2] 03/31/2024 04/02/2024 Yes      Discharged Condition: stable    Disposition: Home or Self Care    Follow Up:    Patient Instructions:      Ambulatory referral/consult to Gastroenterology   Standing Status: Future   Referral Priority: Routine Referral Type: Consultation   Referral Reason: Specialty Services Required   Requested Specialty: Gastroenterology   Number of Visits Requested: 1     Medications:  Reconciled Home Medications:      Medication List        CHANGE how you take these medications      leuprolide acetate (6 month) 45 mg injection  Commonly known as: ELIGARD  Inject 45 mg into the skin every 6 (six) months.  What changed: Another medication with the same name was removed. Continue taking this medication, and follow the directions you see here.            CONTINUE taking these  medications      acetaminophen 500 MG tablet  Commonly known as: TYLENOL  Take 1 tablet (500 mg total) by mouth every 4 (four) hours as needed for Pain.     albuterol 90 mcg/actuation inhaler  Commonly known as: PROVENTIL/VENTOLIN HFA  Inhale 1-2 puffs into the lungs every 4 (four) hours as needed for Wheezing.     amitriptyline 100 MG tablet  Commonly known as: ELAVIL  Take 1 tablet (100 mg total) by mouth nightly as needed for Insomnia.     CENTRUM MEN 50 PLUS MINIS ORAL  Take by mouth.     chlorhexidine 0.12 % solution  Commonly known as: PERIDEX     FLUoxetine 40 MG capsule  Take 1 capsule (40 mg total) by mouth once daily.     fluticasone propionate 50 mcg/actuation nasal spray  Commonly known as: FLONASE  1 spray (50 mcg total) by Each Nostril route 2 (two) times daily as needed for Rhinitis or Allergies.     hydroCHLOROthiazide 25 MG tablet  Commonly known as: HYDRODIURIL  Take 1 tablet (25 mg total) by mouth once daily.     latanoprost 0.005 % ophthalmic solution     loratadine 10 mg tablet  Commonly known as: CLARITIN  TAKE 1 TABLET BY MOUTH ONCE DAILY AS NEEDED FOR ALLERGIES OR RUNNY NOSE     losartan 50 MG tablet  Commonly known as: COZAAR  Take 1 tablet (50 mg total) by mouth once daily.     oxyCODONE 5 mg Cap  Commonly known as: OXY-IR  Take 5 mg by mouth every 4 (four) hours as needed for Pain.     promethazine 25 MG tablet  Commonly known as: PHENERGAN  Take 25 mg by mouth every 6 (six) hours as needed.     rosuvastatin 10 MG tablet  Commonly known as: CRESTOR  Take 1 tablet (10 mg total) by mouth every evening.            STOP taking these medications      C-TUB Misc  Generic drug: miscellaneous medical supply     meclizine 12.5 mg tablet  Commonly known as: ANTIVERT     meloxicam 15 MG tablet  Commonly known as: MOBIC     montelukast 10 mg tablet  Commonly known as: SINGULAIR     mupirocin 2 % ointment  Commonly known as: BACTROBAN     naproxen 500 MG tablet  Commonly known as: NAPROSYN      potassium chloride SA 20 MEQ tablet  Commonly known as: K-DUR,KLOR-CON                  Pending Diagnostic Studies:       None          Indwelling Lines/Drains at time of discharge:   Lines/Drains/Airways       None                   Time spent on the discharge of patient: 40 minutes         Shaheen Clayton MD  Department of Hospital Medicine  [facility] S[facility] ([place])

## 2024-04-02 NOTE — PLAN OF CARE
Spoke to pt and his daughter to schedule procedure(s) Colonoscopy       Physician to perform procedure(s) Dr. SANDRA Clarke  Date of Procedure (s) 4/4/24  Arrival Time 7:45 AM  Time of Procedure(s) 8:45 AM   Location of Procedure(s) [facility] 2nd Floor--Enter at the rear of the building through the emergency department screening station or the Outpatient Registration door, then continue to endoscopy department on the 2nd floor.    Type of Rx Prep sent to patient: PEG  Instructions provided to patient via [facility]    Patient was informed on the following information and verbalized understanding. Screening questionnaire reviewed with patient and complete. If procedure requires anesthesia, a responsible adult needs to be present to accompany the patient home, patient cannot drive after receiving anesthesia. Appointment details are tentative, especially check-in time. Patient will receive a prep-op call 7 days prior to confirm check-in time for procedure. If applicable the patient should contact their pharmacy to verify Rx for procedure prep is ready for pick-up. Patient was advised to call the scheduling department at [phone] if pharmacy states no Rx is available. Patient was advised to call the endoscopy scheduling department if any questions or concerns arise.      [facility] Endoscopy Scheduling Department

## 2024-04-02 NOTE — PLAN OF CARE
[facility] scheduled pcp f/u   Future Appointments   Date Time Provider Department Center   4/12/2024  9:00 AM Cee Solo DO [facility] [place]   5/28/2024 10:00 AM LAB, [facility] [place]   6/4/2024  2:20 PM Cee Solo,  [facility]

## 2024-04-02 NOTE — NURSING
Pt left home in NAD.  IV and heart monitor removed.  Belongings secured with pt.  Pt took shower before leaving.  DC instructions, follow-up, and medication changes reviewed with pt.  Informed pt about GI referral and how to contact them.  Verbalized understanding and had no further questions.

## 2024-04-02 NOTE — PLAN OF CARE
CM spoke with pt in room.  He is ready to go home, has gotten d/c teaching, states he has no new meds.  His ride is on the way, denies DME needs.    NIKI VeraN, BS, RN, CCM

## 2024-04-02 NOTE — TELEPHONE ENCOUNTER
Spoke to pt and his daughter to schedule procedure(s) Colonoscopy       Physician to perform procedure(s) Dr. SANDRA Clarke  Date of Procedure (s) 4/4/24  Arrival Time 7:45 AM  Time of Procedure(s) 8:45 AM   Location of Procedure(s) [facility] 2nd Floor--Enter at the rear of the building through the emergency department screening station or the Outpatient Registration door, then continue to endoscopy department on the 2nd floor.    Type of Rx Prep sent to patient: PEG  Instructions provided to patient via [facility]    Patient was informed on the following information and verbalized understanding. Screening questionnaire reviewed with patient and complete. If procedure requires anesthesia, a responsible adult needs to be present to accompany the patient home, patient cannot drive after receiving anesthesia. Appointment details are tentative, especially check-in time. Patient will receive a prep-op call 7 days prior to confirm check-in time for procedure. If applicable the patient should contact their pharmacy to verify Rx for procedure prep is ready for pick-up. Patient was advised to call the scheduling department at [phone] if pharmacy states no Rx is available. Patient was advised to call the endoscopy scheduling department if any questions or concerns arise.      [facility] Endoscopy Scheduling Department

## 2024-04-03 ENCOUNTER — ANESTHESIA EVENT (OUTPATIENT)
Dept: ENDOSCOPY | Facility: HOSPITAL | Age: 59
End: 2024-04-03
Payer: MEDICARE

## 2024-04-03 NOTE — PLAN OF CARE
[facility] - Med Surg ([facility])  Discharge Final Note    Primary Care Provider: Cee Solo DO    Expected Discharge Date: 4/2/2024    Final Discharge Note (most recent)       Final Note - 04/03/24 0806          Final Note    Assessment Type Final Discharge Note (P)      Anticipated Discharge Disposition Home or Self Care (P)         Post-Acute Status    Coverage [employer] (P)      Discharge Delays None known at this time (P)                      Important Message from Medicare    Pt d/c'd to home.    Rivka Pickard, NIKIN, BS, RN, CCM

## 2024-04-04 ENCOUNTER — ANESTHESIA (OUTPATIENT)
Dept: ENDOSCOPY | Facility: HOSPITAL | Age: 59
End: 2024-04-04
Payer: MEDICARE

## 2024-04-04 ENCOUNTER — HOSPITAL ENCOUNTER (OUTPATIENT)
Facility: HOSPITAL | Age: 59
Discharge: HOME | End: 2024-04-04
Attending: INTERNAL MEDICINE | Admitting: INTERNAL MEDICINE
Payer: MEDICARE

## 2024-04-04 VITALS
SYSTOLIC BLOOD PRESSURE: 131 MMHG | DIASTOLIC BLOOD PRESSURE: 79 MMHG | OXYGEN SATURATION: 99 % | TEMPERATURE: 97 F | HEART RATE: 71 BPM | RESPIRATION RATE: 18 BRPM

## 2024-04-04 DIAGNOSIS — K62.5 RECTAL BLEEDING: ICD-10-CM

## 2024-04-04 PROCEDURE — 88305 TISSUE EXAM BY PATHOLOGIST: CPT | Performed by: PATHOLOGY

## 2024-04-04 PROCEDURE — 25000003 PHARM REV CODE 250: Performed by: STUDENT IN AN ORGANIZED HEALTH CARE EDUCATION/TRAINING PROGRAM

## 2024-04-04 PROCEDURE — 88305 TISSUE EXAM BY PATHOLOGIST: CPT | Mod: 26,,, | Performed by: PATHOLOGY

## 2024-04-04 PROCEDURE — 25000003 PHARM REV CODE 250: Performed by: ANESTHESIOLOGY

## 2024-04-04 PROCEDURE — 37000009 HC ANESTHESIA EA ADD 15 MINS: Performed by: INTERNAL MEDICINE

## 2024-04-04 PROCEDURE — 45385 COLONOSCOPY W/LESION REMOVAL: CPT | Mod: PT | Performed by: INTERNAL MEDICINE

## 2024-04-04 PROCEDURE — D9220A PRA ANESTHESIA: Mod: PT,ANES,, | Performed by: ANESTHESIOLOGY

## 2024-04-04 PROCEDURE — 63600175 PHARM REV CODE 636 W HCPCS: Performed by: STUDENT IN AN ORGANIZED HEALTH CARE EDUCATION/TRAINING PROGRAM

## 2024-04-04 PROCEDURE — 37000008 HC ANESTHESIA 1ST 15 MINUTES: Performed by: INTERNAL MEDICINE

## 2024-04-04 PROCEDURE — 45385 COLONOSCOPY W/LESION REMOVAL: CPT | Mod: PT,,, | Performed by: INTERNAL MEDICINE

## 2024-04-04 PROCEDURE — D9220A PRA ANESTHESIA: Mod: PT,CRNA,, | Performed by: STUDENT IN AN ORGANIZED HEALTH CARE EDUCATION/TRAINING PROGRAM

## 2024-04-04 PROCEDURE — 27201089 HC SNARE, DISP (ANY): Performed by: INTERNAL MEDICINE

## 2024-04-04 RX ORDER — PROPOFOL 10 MG/ML
VIAL (ML) INTRAVENOUS
Status: DISCONTINUED
Start: 2024-04-04 | End: 2024-04-04 | Stop reason: HOSPADM

## 2024-04-04 RX ORDER — LIDOCAINE HYDROCHLORIDE 20 MG/ML
INJECTION, SOLUTION EPIDURAL; INFILTRATION; INTRACAUDAL; PERINEURAL
Status: DISCONTINUED
Start: 2024-04-04 | End: 2024-04-04 | Stop reason: HOSPADM

## 2024-04-04 RX ORDER — PROPOFOL 10 MG/ML
VIAL (ML) INTRAVENOUS
Status: DISCONTINUED | OUTPATIENT
Start: 2024-04-04 | End: 2024-04-04

## 2024-04-04 RX ORDER — LIDOCAINE HYDROCHLORIDE 10 MG/ML
1 INJECTION, SOLUTION EPIDURAL; INFILTRATION; INTRACAUDAL; PERINEURAL ONCE
Status: DISCONTINUED | OUTPATIENT
Start: 2024-04-04 | End: 2024-04-04 | Stop reason: HOSPADM

## 2024-04-04 RX ORDER — SODIUM CHLORIDE 9 MG/ML
INJECTION, SOLUTION INTRAVENOUS CONTINUOUS
Status: DISCONTINUED | OUTPATIENT
Start: 2024-04-04 | End: 2024-04-04 | Stop reason: HOSPADM

## 2024-04-04 RX ORDER — LIDOCAINE HYDROCHLORIDE 20 MG/ML
INJECTION INTRAVENOUS
Status: DISCONTINUED | OUTPATIENT
Start: 2024-04-04 | End: 2024-04-04

## 2024-04-04 RX ADMIN — PROPOFOL 40 MG: 10 INJECTION, EMULSION INTRAVENOUS at 09:04

## 2024-04-04 RX ADMIN — SODIUM CHLORIDE: 0.9 INJECTION, SOLUTION INTRAVENOUS at 08:04

## 2024-04-04 RX ADMIN — PROPOFOL 20 MG: 10 INJECTION, EMULSION INTRAVENOUS at 08:04

## 2024-04-04 RX ADMIN — LIDOCAINE HYDROCHLORIDE 100 MG: 20 INJECTION, SOLUTION INTRAVENOUS at 08:04

## 2024-04-04 RX ADMIN — PROPOFOL 60 MG: 10 INJECTION, EMULSION INTRAVENOUS at 08:04

## 2024-04-04 RX ADMIN — PROPOFOL 40 MG: 10 INJECTION, EMULSION INTRAVENOUS at 08:04

## 2024-04-04 RX ADMIN — PROPOFOL 20 MG: 10 INJECTION, EMULSION INTRAVENOUS at 09:04

## 2024-04-04 NOTE — PROVATION PATIENT INSTRUCTIONS
Discharge Summary/Instructions after an Endoscopic Procedure  Patient Name: Randolph Blue  Patient MRN: [id number]  Patient YOB: 1965 Thursday, April 4, 2024  Tj Clarke MD  Dear patient,  As a result of recent federal legislation (The Federal Cures Act), you may   receive lab or pathology results from your procedure in your [facility]   account before your physician is able to contact you. Your physician or   their representative will relay the results to you with their   recommendations at their soonest availability.  Thank you,  RESTRICTIONS:  During your procedure today, you received medications for sedation.  These   medications may affect your judgment, balance and coordination.  Therefore,   for 24 hours, you have the following restrictions:   - DO NOT drive a car, operate machinery, make legal/financial decisions,   sign important papers or drink alcohol.    ACTIVITY:  Today: no heavy lifting, straining or running due to procedural   sedation/anesthesia.  The following day: return to full activity including work.  DIET:  Eat and drink normally unless instructed otherwise.     TREATMENT FOR COMMON SIDE EFFECTS:  - Mild abdominal pain, nausea, belching, bloating or excessive gas:  rest,   eat lightly and use a heating pad.  - Sore Throat: treat with throat lozenges and/or gargle with warm salt   water.  - Because air was used during the procedure, expelling large amounts of air   from your rectum or belching is normal.  - If a bowel prep was taken, you may not have a bowel movement for 1-3 days.    This is normal.  SYMPTOMS TO WATCH FOR AND REPORT TO YOUR PHYSICIAN:  1. Abdominal pain or bloating, other than gas cramps.  2. Chest pain.  3. Back pain.  4. Signs of infection such as: chills or fever occurring within 24 hours   after the procedure.  5. Rectal bleeding, which would show as bright red, maroon, or black stools.   (A tablespoon of blood from the rectum is not serious, especially if    hemorrhoids are present.)  6. Vomiting.  7. Weakness or dizziness.  GO DIRECTLY TO THE NEAREST EMERGENCY ROOM IF YOU HAVE ANY OF THE FOLLOWING:      Difficulty breathing              Chills and/or fever over 101 F   Persistent vomiting and/or vomiting blood   Severe abdominal pain   Severe chest pain   Black, tarry stools   Bleeding- more than one tablespoon   Any other symptom or condition that you feel may need urgent attention  Your doctor recommends these additional instructions:  If any biopsies were taken, your doctors clinic will contact you in 1 to 2   weeks with any results.  - Discharge patient to home (ambulatory).   - Patient has a contact number available for emergencies.  The signs and   symptoms of potential delayed complications were discussed with the   patient.  Return to normal activities tomorrow.  Written discharge   instructions were provided to the patient.   - Resume previous diet.   - Continue present medications.   - Return to primary care physician as previously scheduled.   - Repeat colonoscopy in 5 years for surveillance.  For questions, problems or results please call your physician - Tj Clarke MD at Work:  [phone].  [facility] can be reached at [phone]     IF A COMPLICATION OR EMERGENCY SITUATION ARISES AND YOU ARE UNABLE TO REACH   YOUR PHYSICIAN - GO DIRECTLY TO THE EMERGENCY ROOM.  Tj Clarke MD  4/4/2024 9:14:51 AM  This report has been verified and signed electronically.  Dear patient,  As a result of recent federal legislation (The Federal Cures Act), you may   receive lab or pathology results from your procedure in your [facility]   account before your physician is able to contact you. Your physician or   their representative will relay the results to you with their   recommendations at their soonest availability.  Thank you,  PROVATION

## 2024-04-04 NOTE — TRANSFER OF CARE
Anesthesia Transfer of Care Note    Patient: Randolph Blue    Procedure(s) Performed: Procedure(s) (LRB):  COLONOSCOPY (N/A)    Patient location: GI    Anesthesia Type: general    Transport from OR: Transported from OR on room air with adequate spontaneous ventilation    Post pain: adequate analgesia    Post assessment: no apparent anesthetic complications and tolerated procedure well    Post vital signs: stable    Level of consciousness: awake and alert    Nausea/Vomiting: no nausea/vomiting    Complications: none    Transfer of care protocol was followed      Last vitals: Visit Vitals  /65 (BP Location: Left arm, Patient Position: Lying)   Pulse 76   Temp 36.3 °C (97.4 °F) (Axillary)   Resp 18   SpO2 96%

## 2024-04-04 NOTE — ANESTHESIA PREPROCEDURE EVALUATION
04/04/2024  Randolph Blue is a 58 y.o., male.      Pre-op Assessment     I have reviewed the Nursing Notes.    I have reviewed the Medications.     Review of Systems  Anesthesia Hx:  No problems with previous Anesthesia             Denies Family Hx of Anesthesia complications.     Social:  Non-Smoker, No Alcohol Use       Hematology/Oncology:  Hematology Normal   Oncology Normal                                   EENT/Dental:  EENT/Dental Normal           Cardiovascular:  Exercise tolerance: good   Hypertension                                  Hypertension         Pulmonary:  Pulmonary Normal                       Renal/:  Chronic Renal Disease        Kidney Function/Disease             Hepatic/GI:     GERD      Gerd          Musculoskeletal:  Musculoskeletal Normal                Neurological:  Neurology Normal                                      Endocrine:  Endocrine Normal            Psych:  Psychiatric History                  Physical Exam  General: Well nourished    Airway:  Mallampati: II / II  Mouth Opening: Normal  TM Distance: Normal  Tongue: Normal  Neck ROM: Normal ROM    Dental:  Intact        Anesthesia Plan  Type of Anesthesia, risks & benefits discussed:    Anesthesia Type: Gen Natural Airway  Intra-op Monitoring Plan: Standard ASA Monitors  Post Op Pain Control Plan: multimodal analgesia  Induction:  IV  Airway Plan: Direct, Post-Induction  Informed Consent: Informed consent signed with the Patient and all parties understand the risks and agree with anesthesia plan.  All questions answered.   ASA Score: 3    Ready For Surgery From Anesthesia Perspective.     .

## 2024-04-04 NOTE — ANESTHESIA POSTPROCEDURE EVALUATION
Anesthesia Post Evaluation    Patient: Randolph Blue    Procedure(s) Performed: Procedure(s) (LRB):  COLONOSCOPY (N/A)    Final Anesthesia Type: general      Patient location during evaluation: GI PACU  Patient participation: Yes- Able to Participate  Level of consciousness: awake and alert  Post-procedure vital signs: reviewed and stable  Pain management: adequate  Airway patency: patent    PONV status at discharge: No PONV  Anesthetic complications: no      Cardiovascular status: blood pressure returned to baseline and hemodynamically stable  Respiratory status: unassisted  Hydration status: euvolemic  Follow-up not needed.              Vitals Value Taken Time   /65 04/04/24 0916   Temp 36.3 °C (97.4 °F) 04/04/24 0916   Pulse 76 04/04/24 0916   Resp 18 04/04/24 0916   SpO2 96 % 04/04/24 0916         No case tracking events are documented in the log.      Pain/Dennis Score: No data recorded

## 2024-04-04 NOTE — PLAN OF CARE
Procedure and recovery complete. Pt awake and alert. MD and aunt at bedside, procedure findings and suggestions discussed. Discharge instructions given, pt verbalized understanding of instruction. Gait steady, able to ambulate without assistance. Pt walked out accompanied by aunt.

## 2024-04-07 ENCOUNTER — PATIENT MESSAGE (OUTPATIENT)
Dept: GASTROENTEROLOGY | Facility: CLINIC | Age: 59
End: 2024-04-07
Payer: MEDICARE

## 2024-04-09 LAB
FINAL PATHOLOGIC DIAGNOSIS: NORMAL
GROSS: NORMAL
Lab: NORMAL

## 2024-04-12 ENCOUNTER — OFFICE VISIT (OUTPATIENT)
Dept: FAMILY MEDICINE | Facility: CLINIC | Age: 59
End: 2024-04-12
Payer: MEDICARE

## 2024-04-12 VITALS
BODY MASS INDEX: 41 KG/M2 | HEART RATE: 71 BPM | OXYGEN SATURATION: 95 % | SYSTOLIC BLOOD PRESSURE: 106 MMHG | TEMPERATURE: 98 F | HEIGHT: 67 IN | DIASTOLIC BLOOD PRESSURE: 78 MMHG | WEIGHT: 261.25 LBS

## 2024-04-12 DIAGNOSIS — I10 ESSENTIAL HYPERTENSION: ICD-10-CM

## 2024-04-12 DIAGNOSIS — Z09 HOSPITAL DISCHARGE FOLLOW-UP: Primary | ICD-10-CM

## 2024-04-12 DIAGNOSIS — R91.1 PULMONARY NODULE, RIGHT: ICD-10-CM

## 2024-04-12 DIAGNOSIS — D64.9 ANEMIA, UNSPECIFIED TYPE: ICD-10-CM

## 2024-04-12 DIAGNOSIS — Z87.19 HISTORY OF RECTAL BLEEDING: ICD-10-CM

## 2024-04-12 DIAGNOSIS — J30.9 ALLERGIC SINUSITIS: ICD-10-CM

## 2024-04-12 PROCEDURE — 3074F SYST BP LT 130 MM HG: CPT | Mod: CPTII,S$GLB,, | Performed by: FAMILY MEDICINE

## 2024-04-12 PROCEDURE — 99214 OFFICE O/P EST MOD 30 MIN: CPT | Mod: S$GLB,,, | Performed by: FAMILY MEDICINE

## 2024-04-12 PROCEDURE — 1160F RVW MEDS BY RX/DR IN RCRD: CPT | Mod: CPTII,S$GLB,, | Performed by: FAMILY MEDICINE

## 2024-04-12 PROCEDURE — 99999 PR PBB SHADOW E&M-EST. PATIENT-LVL V: CPT | Mod: PBBFAC,,, | Performed by: FAMILY MEDICINE

## 2024-04-12 PROCEDURE — 3078F DIAST BP <80 MM HG: CPT | Mod: CPTII,S$GLB,, | Performed by: FAMILY MEDICINE

## 2024-04-12 PROCEDURE — 3008F BODY MASS INDEX DOCD: CPT | Mod: CPTII,S$GLB,, | Performed by: FAMILY MEDICINE

## 2024-04-12 PROCEDURE — 1159F MED LIST DOCD IN RCRD: CPT | Mod: CPTII,S$GLB,, | Performed by: FAMILY MEDICINE

## 2024-04-12 PROCEDURE — 4010F ACE/ARB THERAPY RXD/TAKEN: CPT | Mod: CPTII,S$GLB,, | Performed by: FAMILY MEDICINE

## 2024-04-12 RX ORDER — AMITRIPTYLINE HYDROCHLORIDE 50 MG/1
25 TABLET, FILM COATED ORAL NIGHTLY
COMMUNITY
Start: 2024-02-28

## 2024-04-12 RX ORDER — POLYETHYLENE GLYCOL 3350, SODIUM SULFATE ANHYDROUS, SODIUM BICARBONATE, SODIUM CHLORIDE, POTASSIUM CHLORIDE 236; 22.74; 6.74; 5.86; 2.97 G/4L; G/4L; G/4L; G/4L; G/4L
4000 POWDER, FOR SOLUTION ORAL ONCE
COMMUNITY
Start: 2024-04-02

## 2024-04-12 RX ORDER — LORATADINE 10 MG/1
TABLET ORAL
Qty: 30 TABLET | Refills: 0 | Status: CANCELLED | OUTPATIENT
Start: 2024-04-12

## 2024-04-12 RX ORDER — MINERAL OIL
180 ENEMA (ML) RECTAL DAILY
Qty: 30 TABLET | Refills: 11 | Status: SHIPPED | OUTPATIENT
Start: 2024-04-12 | End: 2025-04-12

## 2024-04-12 NOTE — PROGRESS NOTES
"Assessment & Plan:    Hospital discharge follow-up    History of rectal bleeding  Resolved.   Keep follow up with Urology at the end of this month to r/o  source of bleeding.    Anemia, unspecified type  Chronic, stable.     Essential hypertension  Controlled. Continue current therapy.     Pulmonary nodule, right  -     CT Chest Without Contrast; Future; Expected date: 04/12/2025    Result reviewed with patient.  Pulmonary nodule is benign, f/u CT recommended in 1 year in a high risk patient.   He is not a smoker but given his history of prostate CA, we will repeat the CT chest in 1 year to make sure there is no change.     Allergic sinusitis  -     fexofenadine (ALLEGRA) 180 MG tablet; Take 1 tablet (180 mg total) by mouth once daily.  Dispense: 30 tablet; Refill: 11    Cough most likely due to PND from sleeping. Start nighttime Allegra.       Follow-up: Follow up if symptoms worsen or fail to improve.    Has repeat labs/chronic follow up in May/June.  ______________________________________________________________________    Chief Complaint  Chief Complaint   Patient presents with    Hospital Follow Up       Randolph Blue is a 58 y.o. male with medical diagnoses as listed in the medical history and problem list that presents to the office to follow up on recent hospitalization for a suspected lower GI bleed from 3/31 - 4/2. The HPI and hospital course were summarized as below:    "HPI: 58-year-old gentleman past medical history of prostate cancer status post surgery, hypertension and hypercholesteremia was well until yesterday he experienced mild bleeding per rectum, decided to go to [facility] however did not receive much attention and opted to come to [facility] instead.  Lab work done at [facility] showed normal hemoglobin and electrolytes.  Patient denies any symptoms of anemia, only admits to a very mild headache.  Unable to quantify the amount of blood noted per rectum but reports that it was not a lot, it  also may have " "dripped down towards his penis.  He denies any abdominal pain, nausea vomiting or diarrhea.  Reports of minimal per rectal bleeding about a month ago, otherwise has never had this issue.  Outside hospital reports colonic diverticulosis on CT.  In the emergency room vital signs stable, mild hypertension.  Urinalysis unremarkable for hematuria.  It was likely that patient had lab errors as his hemoglobin was 7.6, profound hypokalemia and hypocalcemia in addition to hypoglycemia, thereafter repeat point of care glucose read 87(he did not receive any food/glucose).      Hospital Course:  Patient was admitted and managed for GI bleed.  During hospital stay he had no GI bleed and had normal bowel movements.  His hemoglobin had no significant drop and was stable.  CT reports diverticular disease.  GI was consulted who recommended outpatient follow up and scoping.  Patient was discharged with referral to GI given."    Patient had a colonoscopy on 4/4 showing:    "One 5 mm polyp in the transverse colon, removed with a cold snare.   Diverticulosis in the sigmoid colon and in the descending colon.   A few non-bleeding colonic angioectasias."                       Of note, patient [facility] ER [facility] for right-sided CP. His workup was remarkable only for a right-sided 4 mm pulmonary nodule. CP is attributed to gas. He had a normal stress echo in 2023.     He saw his Hematologist yesterday to follow up on prostate cancer. Manual differential, B12, and ferritin were normal. He has a follow up with Urology on 4/22 for penile bleeding.     Today, patient states that he is recovering well after hospital discharge. Denies any further blood in the stool. Continues to have a productive cough, which was discussed in a previous visit. Sleeps with a fan on at night, due to hot flashes from Lupron.       PAST MEDICAL HISTORY:  Past Medical History:   Diagnosis Date    Arthritis     Bronchitis     Cervical " arthritis     SILVIA (generalized anxiety disorder) 01/03/2014    GERD (gastroesophageal reflux disease)     Glaucoma     Hyperlipidemia, mixed 04/03/2014    Hypertension        PAST SURGICAL HISTORY:  Past Surgical History:   Procedure Laterality Date    COLONOSCOPY N/A 4/4/2024    Procedure: COLONOSCOPY;  Surgeon: Tj Clarke MD;  Location: [facility];  Service: Endoscopy;  Laterality: N/A;  Referred by: Dr. Cee Solo  Prep: PEG  Route instructions sent: portal  Other concerns: Rectal bleeding, pt just d/c'd from [facility]    KNEE SURGERY      bilateral    MANDIBLE FRACTURE SURGERY      PROSTATE SURGERY      SHOULDER SURGERY      [place] at [facility].        SOCIAL HISTORY:  Social History     Socioeconomic History    Marital status: Single   Tobacco Use    Smoking status: Never    Smokeless tobacco: Never   Substance and Sexual Activity    Alcohol use: Not Currently     Alcohol/week: 3.3 - 4.2 standard drinks of alcohol     Types: 4 - 5 Standard drinks or equivalent per week     Comment: No alcohol x 2 weeks    Drug use: No    Sexual activity: Not Currently     Social Determinants of Health     Financial Resource Strain: Low Risk  (4/5/2024)    Overall Financial Resource Strain (CARDIA)     Difficulty of Paying Living Expenses: Not hard at all   Food Insecurity: No Food Insecurity (4/5/2024)    Hunger Vital Sign     Worried About Running Out of Food in the Last Year: Never true     Ran Out of Food in the Last Year: Never true   Transportation Needs: No Transportation Needs (4/5/2024)    PRAPARE - Transportation     Lack of Transportation (Medical): No     Lack of Transportation (Non-Medical): No   Physical Activity: Inactive (4/5/2024)    Exercise Vital Sign     Days of Exercise per Week: 0 days     Minutes of Exercise per Session: 0 min   Stress: No Stress Concern Present (4/5/2024)    [employer] [place] [employer] - Occupational Stress Questionnaire     Feeling of Stress : Not at all    Social Connections: Moderately Isolated (4/5/2024)    Social Connection and Isolation Panel [NHANES]     Frequency of Communication with Friends and Family: Once a week     Frequency of Social Gatherings with Friends and Family: Once a week     Attends Islam Services: More than 4 times per year     Active Member of Clubs or Organizations: Yes     Attends Club or Organization Meetings: More than 4 times per year     Marital Status: Never    Housing Stability: Low Risk  (4/5/2024)    Housing Stability Vital Sign     Unable to Pay for Housing in the Last Year: No     Number of Places Lived in the Last Year: 1     Unstable Housing in the Last Year: No       FAMILY HISTORY:  Family History   Problem Relation Age of Onset    Cancer Mother     Hypertension Mother     Heart disease Father     Hypertension Maternal Uncle     Hypertension Paternal Uncle     Hypertension Maternal Grandmother     Heart disease Paternal Grandmother     Hypertension Paternal Grandmother        ALLERGIES AND MEDICATIONS: updated and reviewed.  Review of patient's allergies indicates:   Allergen Reactions    Percocet [oxycodone-acetaminophen]      Other reaction(s): Rash     Current Outpatient Medications   Medication Sig Dispense Refill    acetaminophen (TYLENOL) 500 MG tablet Take 1 tablet (500 mg total) by mouth every 4 (four) hours as needed for Pain. 20 tablet 0    albuterol (PROVENTIL/VENTOLIN HFA) 90 mcg/actuation inhaler Inhale 1-2 puffs into the lungs every 4 (four) hours as needed for Wheezing. 18 g 11    amitriptyline (ELAVIL) 50 MG tablet Take 25 mg by mouth every evening.      chlorhexidine (PERIDEX) 0.12 % solution       FLUoxetine 40 MG capsule Take 1 capsule (40 mg total) by mouth once daily. 90 capsule 3    fluticasone propionate (FLONASE) 50 mcg/actuation nasal spray 1 spray (50 mcg total) by Each Nostril route 2 (two) times daily as needed for Rhinitis or Allergies. 15 g 0    hydroCHLOROthiazide (HYDRODIURIL) 25 MG  "tablet Take 1 tablet (25 mg total) by mouth once daily. 90 tablet 3    latanoprost 0.005 % ophthalmic solution       leuprolide acetate, 6 month, (ELIGARD) 45 mg injection Inject 45 mg into the skin every 6 (six) months.      losartan (COZAAR) 50 MG tablet Take 1 tablet (50 mg total) by mouth once daily. 90 tablet 3    mv-min/folic/K1/lycopen/lutein (CENTRUM MEN 50 PLUS MINIS ORAL) Take by mouth.      oxyCODONE (OXY-IR) 5 mg Cap Take 5 mg by mouth every 4 (four) hours as needed for Pain.      polyethylene glycol (GOLYTELY) 236-22.74-6.74 -5.86 gram suspension Take 4,000 mLs by mouth once.      promethazine (PHENERGAN) 25 MG tablet Take 25 mg by mouth every 6 (six) hours as needed.      rosuvastatin (CRESTOR) 10 MG tablet Take 1 tablet (10 mg total) by mouth every evening. 90 tablet 3    fexofenadine (ALLEGRA) 180 MG tablet Take 1 tablet (180 mg total) by mouth once daily. 30 tablet 11     No current facility-administered medications for this visit.         ROS  Review of Systems   Constitutional:  Negative for activity change.   Respiratory:  Positive for cough.    Gastrointestinal:  Negative for anal bleeding and blood in stool.   Genitourinary:  Negative for hematuria.   Psychiatric/Behavioral:  Negative for sleep disturbance.            Physical Exam  Vitals:    04/12/24 0908   BP: 106/78   BP Location: Left arm   Patient Position: Sitting   BP Method: Large (Manual)   Pulse: 71   Temp: 97.9 °F (36.6 °C)   TempSrc: Oral   SpO2: 95%   Weight: 118.5 kg (261 lb 3.9 oz)   Height: 5' 7" (1.702 m)    Body mass index is 40.92 kg/m².  Weight: 118.5 kg (261 lb 3.9 oz)   Height: 5' 7" (170.2 cm)   Physical Exam  Constitutional:       General: He is not in acute distress.     Appearance: He is obese.   HENT:      Head: Normocephalic and atraumatic.   Cardiovascular:      Rate and Rhythm: Normal rate and regular rhythm.      Pulses: Normal pulses.      Heart sounds: Normal heart sounds.   Pulmonary:      Effort: Pulmonary " effort is normal. No respiratory distress.      Breath sounds: Normal breath sounds.   Skin:     General: Skin is warm and dry.      Findings: No rash.   Neurological:      General: No focal deficit present.      Mental Status: He is alert and oriented to person, place, and time.   Psychiatric:         Mood and Affect: Mood normal.         Behavior: Behavior normal.         Thought Content: Thought content normal.

## 2024-04-17 ENCOUNTER — HOSPITAL ENCOUNTER (OUTPATIENT)
Dept: RADIOLOGY | Facility: HOSPITAL | Age: 59
Discharge: HOME | End: 2024-04-17
Attending: FAMILY MEDICINE
Payer: MEDICARE

## 2024-04-17 DIAGNOSIS — R91.1 PULMONARY NODULE, RIGHT: ICD-10-CM

## 2024-04-17 PROCEDURE — 71250 CT THORAX DX C-: CPT | Mod: 26,,, | Performed by: INTERNAL MEDICINE

## 2024-04-17 PROCEDURE — 71250 CT THORAX DX C-: CPT | Mod: TC

## 2024-04-18 ENCOUNTER — TELEPHONE (OUTPATIENT)
Dept: FAMILY MEDICINE | Facility: CLINIC | Age: 59
End: 2024-04-18
Payer: MEDICARE

## 2024-04-18 DIAGNOSIS — R91.1 PULMONARY NODULE, RIGHT: Primary | ICD-10-CM

## 2024-04-18 NOTE — TELEPHONE ENCOUNTER
Uncertain if patient uses his portal. Patient had a CT chest done but this was not supposed to be done for another year. No changes to the CT scan. He is to repeat this in 1 year.

## 2024-05-03 ENCOUNTER — HOSPITAL ENCOUNTER (EMERGENCY)
Facility: HOSPITAL | Age: 59
Discharge: HOME | End: 2024-05-03
Attending: EMERGENCY MEDICINE
Payer: MEDICARE

## 2024-05-03 VITALS
SYSTOLIC BLOOD PRESSURE: 113 MMHG | TEMPERATURE: 98 F | HEART RATE: 66 BPM | RESPIRATION RATE: 18 BRPM | BODY MASS INDEX: 39.94 KG/M2 | OXYGEN SATURATION: 96 % | WEIGHT: 255 LBS | DIASTOLIC BLOOD PRESSURE: 76 MMHG

## 2024-05-03 DIAGNOSIS — R51.9 NONINTRACTABLE HEADACHE, UNSPECIFIED CHRONICITY PATTERN, UNSPECIFIED HEADACHE TYPE: Primary | ICD-10-CM

## 2024-05-03 PROCEDURE — 99281 EMR DPT VST MAYX REQ PHY/QHP: CPT | Mod: ER

## 2024-05-03 NOTE — DISCHARGE INSTRUCTIONS
Please take your prescribed Zyrtec and nasal spray according to the directions on the bottle.  In addition, you may take over-the-counter Tylenol or Motrin as needed for headache relief.  Please read the patient education material regarding home headache remedies and had keep track of your headaches for additional information.  If your symptoms worsen you may return to the ED for reevaluation. Otherwise, please follow up with your primary care doctor within 1 week.

## 2024-05-03 NOTE — ED PROVIDER NOTES
"Encounter Date: 5/3/2024       History     Chief Complaint   Patient presents with    Headache     Headache, "sinus pressure" x 3 days. No fever, congestion, cough.      58-year-old male with past medical history of hypertension, hyperlipidemia and glaucoma presents to the emergency room for evaluation of off and on headache x3 days. Also reports dry cough. Patient reports he was worried that his blood pressure might be high, and therefore he wanted to come in to be evaluated.  He has attempted treatment with Tylenol, Flonase and Vicks for headache and cough with temporary relief.  Reports compliance with his prescribed daily medications.  Patient follows with ophthalmology for glaucoma, last visit was approximately 3 weeks ago and he was told his pressures have improved and they plan to monitor him and have him follow up in 6 months.  Denies sore throat, runny nose, congestion, chest pain, shortness of breath, abdominal pain, fever, chills.    The history is provided by the patient. No  was used.     Review of patient's allergies indicates:   Allergen Reactions    Percocet [oxycodone-acetaminophen]      Other reaction(s): Rash     Past Medical History:   Diagnosis Date    Arthritis     Bronchitis     Cervical arthritis     SILVIA (generalized anxiety disorder) 01/03/2014    GERD (gastroesophageal reflux disease)     Glaucoma     Hyperlipidemia, mixed 04/03/2014    Hypertension      Past Surgical History:   Procedure Laterality Date    COLONOSCOPY N/A 4/4/2024    Procedure: COLONOSCOPY;  Surgeon: jT Clarke MD;  Location: [facility];  Service: Endoscopy;  Laterality: N/A;  Referred by: Dr. Cee Solo  Prep: PEG  Route instructions sent: portal  Other concerns: Rectal bleeding, pt just d/c'd from hospital      KNEE SURGERY      bilateral    MANDIBLE FRACTURE SURGERY      PROSTATE SURGERY      SHOULDER SURGERY      [place] at [facility].      Family History   Problem Relation Name Age of " Onset    Cancer Mother      Hypertension Mother      Heart disease Father      Hypertension Maternal Uncle      Hypertension Paternal Uncle      Hypertension Maternal Grandmother      Heart disease Paternal Grandmother      Hypertension Paternal Grandmother       Social History     Tobacco Use    Smoking status: Never    Smokeless tobacco: Never   Substance Use Topics    Alcohol use: Not Currently     Alcohol/week: 3.3 - 4.2 standard drinks of alcohol     Types: 4 - 5 Standard drinks or equivalent per week     Comment: No alcohol x 2 weeks    Drug use: No     Review of Systems   Constitutional:  Negative for chills and fever.   HENT:  Negative for congestion, sinus pressure, sinus pain, sneezing and sore throat.    Eyes:  Negative for photophobia, pain, discharge, redness and itching.   Respiratory:  Positive for cough. Negative for shortness of breath.    Cardiovascular:  Negative for chest pain.   Gastrointestinal:  Negative for abdominal pain, nausea and vomiting.   Genitourinary:  Negative for dysuria.   Musculoskeletal:  Negative for back pain.   Skin:  Negative for rash.   Neurological:  Positive for headaches. Negative for syncope and weakness.   Hematological:  Does not bruise/bleed easily.       Physical Exam     Initial Vitals [05/03/24 0806]   BP Pulse Resp Temp SpO2   113/76 66 18 98.2 °F (36.8 °C) 96 %      MAP       --         Physical Exam    Nursing note and vitals reviewed.  Constitutional: He appears well-developed and well-nourished. He is not diaphoretic.  Non-toxic appearance. He does not have a sickly appearance. He does not appear ill. No distress.   HENT:   Head: Normocephalic and atraumatic.   Right Ear: Hearing, tympanic membrane, external ear and ear canal normal.   Left Ear: Hearing, tympanic membrane, external ear and ear canal normal.   Nose: Right sinus exhibits no maxillary sinus tenderness and no frontal sinus tenderness. Left sinus exhibits no maxillary sinus tenderness and no  frontal sinus tenderness.   Mouth/Throat: Uvula is midline, oropharynx is clear and moist and mucous membranes are normal.   Eyes: Conjunctivae and EOM are normal. Pupils are equal, round, and reactive to light. Right eye exhibits no discharge. Left eye exhibits no discharge.   Neck:   Normal range of motion.  Cardiovascular:  Normal rate and regular rhythm.           Pulmonary/Chest: Breath sounds normal. No respiratory distress. He has no wheezes. He has no rhonchi. He has no rales.   Abdominal: He exhibits no distension.   Musculoskeletal:         General: No edema.      Cervical back: Normal range of motion.     Neurological: He is alert and oriented to person, place, and time. He has normal strength. No cranial nerve deficit or sensory deficit. GCS score is 15. GCS eye subscore is 4. GCS verbal subscore is 5. GCS motor subscore is 6.   Skin: Skin is warm and dry.   Psychiatric: He has a normal mood and affect. His behavior is normal. Thought content normal.         ED Course   Procedures  Labs Reviewed - No data to display       Imaging Results    None          Medications - No data to display  Medical Decision Making  58-year-old male with past medical history of hypertension, hyperlipidemia and glaucoma presents to the emergency room for evaluation of off and on headache x3 days. Also reports dry cough. Patient reports he was worried that his blood pressure might be high, and therefore he wanted to come in to be evaluated.  He has attempted treatment with Tylenol, Flonase and Vicks for headache and cough with temporary relief.  Reports compliance with his prescribed daily medications.  Patient follows with ophthalmology for glaucoma, last visit was approximately 3 weeks ago and he was told his pressures have improved and they plan to monitor him and have him follow up in 6 months.  Denies sore throat, runny nose, congestion, chest pain, shortness of breath, abdominal pain, fever, chills.  On physical exam  lungs are clear to auscultation.  Regular rate and rhythm.  Pupils are equal, round reactive to light.  Extraocular motions intact.  Eye balls are soft/normal on palpation.  Patient was neurologically intact with no focal neuro deficits.  There are no signs of bacterial infection in the ears or throat.  No sinus tenderness to palpation.  Patient has normal blood pressure at 113/76 upon arrival to the emergency room.  Remainder of vital signs are within normal limits.  As patient's headache is intermittent and given his normal physical exam today, I do not believe patient requires CT scan at this time.  We discussed headache may be due to sinus/allergy.  I recommend that he take daily Zyrtec and Flonase in addition to alternating Tylenol and Motrin as needed for headache.  Patient reports he has prescriptions for all these medications at home.  He was in agreement with this plan.  Return precautions discussed, otherwise follow up with primary care doctor.    Of note: Differential diagnosis to include but not limited to: tension headache, URI, migraine headache    Rosaura Mcgrath PA-C    DISCLAIMER: This note was prepared with [employer] voice recognition transcription software. Garbled syntax, mangled pronouns, and other bizarre constructions may be attributed to that software system. If you have any questions regarding information in this note please contact me.                                           Clinical Impression:  Final diagnoses:  [R51.9] Nonintractable headache, unspecified chronicity pattern, unspecified headache type (Primary)          ED Disposition Condition    Discharge Stable          ED Prescriptions    None       Follow-up Information       Follow up With Specialties Details Why Contact Info    Cee Solo, DO Family Medicine Schedule an appointment as soon as possible for a visit in 1 week For follow up [address] [facility] [place] [address]  [phone]       [facility] ED Emergency Medicine Go to  As needed, If symptoms worsen [address] [facility] [address] [phone]             Rosaura Mcgrath PA-C  05/03/24 0852

## 2024-05-09 ENCOUNTER — HOSPITAL ENCOUNTER (EMERGENCY)
Facility: HOSPITAL | Age: 59
Discharge: HOME | End: 2024-05-09
Attending: EMERGENCY MEDICINE
Payer: MEDICARE

## 2024-05-09 VITALS
WEIGHT: 255 LBS | BODY MASS INDEX: 40.02 KG/M2 | OXYGEN SATURATION: 98 % | DIASTOLIC BLOOD PRESSURE: 72 MMHG | HEIGHT: 67 IN | SYSTOLIC BLOOD PRESSURE: 149 MMHG | TEMPERATURE: 99 F | HEART RATE: 61 BPM | RESPIRATION RATE: 20 BRPM

## 2024-05-09 DIAGNOSIS — R31.9 HEMATURIA, UNSPECIFIED TYPE: Primary | ICD-10-CM

## 2024-05-09 DIAGNOSIS — N30.91 HEMORRHAGIC CYSTITIS: ICD-10-CM

## 2024-05-09 LAB
ALBUMIN SERPL BCP-MCNC: 3.6 G/DL (ref 3.5–5.2)
ALP SERPL-CCNC: 98 U/L (ref 55–135)
ALT SERPL W/O P-5'-P-CCNC: 13 U/L (ref 10–44)
ANION GAP SERPL CALC-SCNC: 9 MMOL/L (ref 8–16)
AST SERPL-CCNC: 20 U/L (ref 10–40)
BASOPHILS # BLD AUTO: 0.03 K/UL (ref 0–0.2)
BASOPHILS NFR BLD: 0.4 % (ref 0–1.9)
BILIRUB SERPL-MCNC: 0.7 MG/DL (ref 0.1–1)
BILIRUB UR QL STRIP: NEGATIVE
BUN SERPL-MCNC: 9 MG/DL (ref 6–20)
CALCIUM SERPL-MCNC: 9.8 MG/DL (ref 8.7–10.5)
CHLORIDE SERPL-SCNC: 100 MMOL/L (ref 95–110)
CLARITY UR: ABNORMAL
CO2 SERPL-SCNC: 29 MMOL/L (ref 23–29)
COLOR UR: YELLOW
CREAT SERPL-MCNC: 0.9 MG/DL (ref 0.5–1.4)
DIFFERENTIAL METHOD BLD: ABNORMAL
EOSINOPHIL # BLD AUTO: 0.2 K/UL (ref 0–0.5)
EOSINOPHIL NFR BLD: 2.4 % (ref 0–8)
ERYTHROCYTE [DISTWIDTH] IN BLOOD BY AUTOMATED COUNT: 13.3 % (ref 11.5–14.5)
EST. GFR  (NO RACE VARIABLE): >60 ML/MIN/1.73 M^2
GLUCOSE SERPL-MCNC: 94 MG/DL (ref 70–110)
GLUCOSE UR QL STRIP: NEGATIVE
HCT VFR BLD AUTO: 38.4 % (ref 40–54)
HGB BLD-MCNC: 12.7 G/DL (ref 14–18)
HGB UR QL STRIP: ABNORMAL
IMM GRANULOCYTES # BLD AUTO: 0.02 K/UL (ref 0–0.04)
IMM GRANULOCYTES NFR BLD AUTO: 0.3 % (ref 0–0.5)
KETONES UR QL STRIP: NEGATIVE
LEUKOCYTE ESTERASE UR QL STRIP: NEGATIVE
LYMPHOCYTES # BLD AUTO: 1.3 K/UL (ref 1–4.8)
LYMPHOCYTES NFR BLD: 18.8 % (ref 18–48)
MCH RBC QN AUTO: 27.1 PG (ref 27–31)
MCHC RBC AUTO-ENTMCNC: 33.1 G/DL (ref 32–36)
MCV RBC AUTO: 82 FL (ref 82–98)
MICROSCOPIC COMMENT: ABNORMAL
MONOCYTES # BLD AUTO: 0.7 K/UL (ref 0.3–1)
MONOCYTES NFR BLD: 10.5 % (ref 4–15)
NEUTROPHILS # BLD AUTO: 4.6 K/UL (ref 1.8–7.7)
NEUTROPHILS NFR BLD: 67.6 % (ref 38–73)
NITRITE UR QL STRIP: NEGATIVE
NRBC BLD-RTO: 0 /100 WBC
PH UR STRIP: 6 [PH] (ref 5–8)
PLATELET # BLD AUTO: 288 K/UL (ref 150–450)
PMV BLD AUTO: 10.3 FL (ref 9.2–12.9)
POTASSIUM SERPL-SCNC: 3.3 MMOL/L (ref 3.5–5.1)
PROT SERPL-MCNC: 7.9 G/DL (ref 6–8.4)
PROT UR QL STRIP: NEGATIVE
RBC # BLD AUTO: 4.69 M/UL (ref 4.6–6.2)
RBC #/AREA URNS HPF: >100 /HPF (ref 0–4)
SODIUM SERPL-SCNC: 138 MMOL/L (ref 136–145)
SP GR UR STRIP: 1.01 (ref 1–1.03)
URN SPEC COLLECT METH UR: ABNORMAL
UROBILINOGEN UR STRIP-ACNC: NEGATIVE EU/DL
WBC # BLD AUTO: 6.77 K/UL (ref 3.9–12.7)

## 2024-05-09 PROCEDURE — 85025 COMPLETE CBC W/AUTO DIFF WBC: CPT | Mod: HCNC | Performed by: EMERGENCY MEDICINE

## 2024-05-09 PROCEDURE — 87086 URINE CULTURE/COLONY COUNT: CPT | Mod: HCNC | Performed by: EMERGENCY MEDICINE

## 2024-05-09 PROCEDURE — 99283 EMERGENCY DEPT VISIT LOW MDM: CPT | Mod: HCNC

## 2024-05-09 PROCEDURE — 81000 URINALYSIS NONAUTO W/SCOPE: CPT | Mod: HCNC | Performed by: EMERGENCY MEDICINE

## 2024-05-09 PROCEDURE — 80053 COMPREHEN METABOLIC PANEL: CPT | Mod: HCNC | Performed by: EMERGENCY MEDICINE

## 2024-05-09 RX ORDER — CEFUROXIME AXETIL 500 MG/1
500 TABLET ORAL EVERY 12 HOURS
Qty: 14 TABLET | Refills: 0 | Status: SHIPPED | OUTPATIENT
Start: 2024-05-09 | End: 2024-05-16

## 2024-05-09 NOTE — ED TRIAGE NOTES
Pt complaining of hematuria x 2 days. Pt has hx of prostate cancer and told by urology to come to ED if blood was seen in urine. Pt reports he was in the hospital recently with hematuria. Denies SOB and chest pain. A&O x 4.

## 2024-05-09 NOTE — ED PROVIDER NOTES
Encounter Date: 5/9/2024    SCRIBE #1 NOTE: I, Tyresecristian Severino, am scribing for, and in the presence of,  Josh Coppola MD.       History     Chief Complaint   Patient presents with    Hematuria     Blood in urine x 2 days. Reports recently d/c from same thing and was told to come to ER if blood returned. Denies abdominal pain or rectal bleeding.Denies pain meds pta.      58 y.o. male with a PMHx of prostate CA, HLD, HTN, presents to the ED for evaluation of hematuria x2 days. Patient reports noticing blood in the toilet after a bowel movement and urination. Unsure where the blood is coming from. Not on anticoagulant. Endorses a hx of similar presentation in the past. Denies abdominal pain, fever, nausea, vomiting or any associated symptoms.     The history is provided by the patient. No  was used.     Review of patient's allergies indicates:   Allergen Reactions    Percocet [oxycodone-acetaminophen]      Other reaction(s): Rash     Past Medical History:   Diagnosis Date    Arthritis     Bronchitis     Cervical arthritis     SILVIA (generalized anxiety disorder) 01/03/2014    GERD (gastroesophageal reflux disease)     Glaucoma     Hyperlipidemia, mixed 04/03/2014    Hypertension      Past Surgical History:   Procedure Laterality Date    COLONOSCOPY N/A 4/4/2024    Procedure: COLONOSCOPY;  Surgeon: Tj Clarke MD;  Location: [facility];  Service: Endoscopy;  Laterality: N/A;  Referred by: Dr. Cee Solo  Prep: PEG  Route instructions sent: portal  Other concerns: Rectal bleeding, pt just d/c'd from hospital  SW    KNEE SURGERY      bilateral    MANDIBLE FRACTURE SURGERY      PROSTATE SURGERY      SHOULDER SURGERY      [place] at [facility].      Family History   Problem Relation Name Age of Onset    Cancer Mother      Hypertension Mother      Heart disease Father      Hypertension Maternal Uncle      Hypertension Paternal Uncle      Hypertension Maternal Grandmother      Heart disease  Paternal Grandmother      Hypertension Paternal Grandmother       Social History     Tobacco Use    Smoking status: Never    Smokeless tobacco: Never   Substance Use Topics    Alcohol use: Not Currently     Alcohol/week: 3.3 - 4.2 standard drinks of alcohol     Types: 4 - 5 Standard drinks or equivalent per week     Comment: No alcohol x 2 weeks    Drug use: No     Review of Systems   Constitutional:  Negative for fever.   HENT:  Negative for facial swelling and sore throat.    Eyes:  Negative for pain and discharge.   Respiratory:  Negative for choking and shortness of breath.    Cardiovascular:  Negative for chest pain.   Gastrointestinal:  Negative for abdominal pain, nausea and vomiting.   Genitourinary:  Positive for hematuria. Negative for dysuria and frequency.   Musculoskeletal:  Negative for back pain.   Skin:  Negative for rash.   Neurological:  Negative for weakness and numbness.       Physical Exam     Initial Vitals [05/09/24 0859]   BP Pulse Resp Temp SpO2   130/85 73 20 98.5 °F (36.9 °C) 95 %      MAP       --         Physical Exam    Nursing note and vitals reviewed.  Constitutional: He is not diaphoretic. No distress.   HENT:   Head: Normocephalic and atraumatic.   Protecting airway   Eyes: Conjunctivae and EOM are normal. No scleral icterus.   Neck: Neck supple. No tracheal deviation present.   Normal range of motion.  Cardiovascular:  Normal rate, regular rhythm and intact distal pulses.           Pulmonary/Chest: No stridor. No respiratory distress.   Speaking in full sentences   Abdominal: Abdomen is soft. He exhibits no distension. There is no abdominal tenderness.   Genitourinary: Rectum:      Guaiac result negative.      No external hemorrhoid or internal hemorrhoid.   Guaiac negative stool.    Genitourinary Comments:  exam chaperoned by HOMA Cueva. Soft brown stools noted.      Musculoskeletal:         General: No tenderness or edema.      Cervical back: Normal range of motion and neck  supple.     Neurological: He is alert. He has normal strength. No cranial nerve deficit or sensory deficit.   Skin: Skin is warm and dry.   Psychiatric: He has a normal mood and affect.         ED Course   Procedures  Labs Reviewed   URINALYSIS, REFLEX TO URINE CULTURE - Abnormal; Notable for the following components:       Result Value    Appearance, UA Hazy (*)     Occult Blood UA 3+ (*)     All other components within normal limits    Narrative:     Specimen Source->Urine   CBC W/ AUTO DIFFERENTIAL - Abnormal; Notable for the following components:    Hemoglobin 12.7 (*)     Hematocrit 38.4 (*)     All other components within normal limits   COMPREHENSIVE METABOLIC PANEL - Abnormal; Notable for the following components:    Potassium 3.3 (*)     All other components within normal limits   URINALYSIS MICROSCOPIC - Abnormal; Notable for the following components:    RBC, UA >100 (*)     All other components within normal limits    Narrative:     Specimen Source->Urine   CULTURE, URINE   CULTURE, URINE          Imaging Results    None          Medications - No data to display  Medical Decision Making  My differential diagnosis includes but is not limited to: hemorrhoidal bleeding, GI bleeding, diverticulosis, diverticulitis, UTI.     Patient is afebrile and in no acute distress at time history and physical.  Vitals within acceptable ranges.  CBC without leukocytosis.  Patient has mild anemia with a hemoglobin comparable to prior.  Chemistry with mild hypokalemia.  Renal function within normal ranges.  There is no significant electrolyte abnormality.  Urinalysis is significant for hematuria.  Unclear if symptoms are related to hemorrhagic cystitis.  Urine culture sent.  Patient started on cefuroxime for complicated UTI.  Patient reports having urology follow-up scheduled for Monday 5/13.  He has been counseled on the importance of obtain this follow-up appointment for further evaluation and management of his hematuria.  counseled on supportive care, appropriate medication usage, concerning symptoms for which to return to ER and the importance of follow up. Understanding and agreement with treatment plan was expressed.     Amount and/or Complexity of Data Reviewed  Labs: ordered. Decision-making details documented in ED Course.    Risk  Prescription drug management.            Scribe Attestation:   Scribe #1: I performed the above scribed service and the documentation accurately describes the services I performed. I attest to the accuracy of the note.              This chart was completed using dictation software, as a result there may be some transcription errors.              I, Josh Coppola , personally performed the services described in this documentation. All medical record entries made by the scribe were at my direction and in my presence. I have reviewed the chart and agree that the record reflects my personal performance and is accurate and complete.      Clinical Impression:  Final diagnoses:  [R31.9] Hematuria, unspecified type (Primary)  [N30.91] Hemorrhagic cystitis          ED Disposition Condition    Discharge Stable          ED Prescriptions       Medication Sig Dispense Start Date End Date Auth. Provider    cefUROXime (CEFTIN) 500 MG tablet Take 1 tablet (500 mg total) by mouth every 12 (twelve) hours. for 7 days 14 tablet 5/9/2024 5/16/2024 Josh Coppola MD          Follow-up Information       Follow up With Specialties Details Why Contact Info    Rectal/Urology, [facility] - Nephrology/Colon & Nephrology, Colon and Rectal Surgery, Urology Go on 5/13/2024 for your urology appointment as scheduled [address] [facility] [address]  [phone]               Josh Coppola MD  05/09/24 1224

## 2024-05-11 LAB — BACTERIA UR CULT: NO GROWTH

## 2024-05-16 ENCOUNTER — HOSPITAL ENCOUNTER (EMERGENCY)
Facility: HOSPITAL | Age: 59
Discharge: HOME | End: 2024-05-16
Attending: EMERGENCY MEDICINE
Payer: MEDICARE

## 2024-05-16 VITALS
RESPIRATION RATE: 18 BRPM | HEART RATE: 82 BPM | DIASTOLIC BLOOD PRESSURE: 65 MMHG | SYSTOLIC BLOOD PRESSURE: 115 MMHG | OXYGEN SATURATION: 96 % | TEMPERATURE: 98 F | BODY MASS INDEX: 40.02 KG/M2 | HEIGHT: 67 IN | WEIGHT: 255 LBS

## 2024-05-16 DIAGNOSIS — R31.9 HEMATURIA, UNSPECIFIED TYPE: Primary | ICD-10-CM

## 2024-05-16 LAB
BILIRUB UR QL STRIP: NEGATIVE
BUN SERPL-MCNC: 21 MG/DL (ref 6–30)
CHLORIDE SERPL-SCNC: 101 MMOL/L (ref 95–110)
CLARITY UR REFRACT.AUTO: CLEAR
COLOR UR AUTO: YELLOW
CREAT SERPL-MCNC: 1 MG/DL (ref 0.5–1.4)
GLUCOSE SERPL-MCNC: 93 MG/DL (ref 70–110)
GLUCOSE UR QL STRIP: NEGATIVE
HCT VFR BLD CALC: 41 %PCV (ref 36–54)
HGB UR QL STRIP: NEGATIVE
KETONES UR QL STRIP: NEGATIVE
LEUKOCYTE ESTERASE UR QL STRIP: NEGATIVE
NITRITE UR QL STRIP: NEGATIVE
PH UR STRIP: 7 [PH] (ref 5–8)
POC IONIZED CALCIUM: 1.15 MMOL/L (ref 1.06–1.42)
POC TCO2 (MEASURED): 29 MMOL/L (ref 23–29)
POTASSIUM BLD-SCNC: 4.7 MMOL/L (ref 3.5–5.1)
PROT UR QL STRIP: NEGATIVE
SAMPLE: NORMAL
SODIUM BLD-SCNC: 136 MMOL/L (ref 136–145)
SP GR UR STRIP: 1.02 (ref 1–1.03)
URN SPEC COLLECT METH UR: NORMAL

## 2024-05-16 PROCEDURE — 80047 BASIC METABLC PNL IONIZED CA: CPT | Mod: HCNC

## 2024-05-16 PROCEDURE — 81003 URINALYSIS AUTO W/O SCOPE: CPT | Mod: HCNC | Performed by: PHYSICIAN ASSISTANT

## 2024-05-16 PROCEDURE — 99282 EMERGENCY DEPT VISIT SF MDM: CPT | Mod: HCNC

## 2024-05-16 NOTE — ED PROVIDER NOTES
Encounter Date: 5/16/2024       History     Chief Complaint   Patient presents with    Hematuria     Last antibiotic today for uti     58 y.o. M with a PMHx of prostate cancer, HTN, HLD, Glaucoma presents to the ED with painless hematuria x2-3 months. This is his third evaluation for this complaint. He was evaluated on 5/9/2024. He was treated for UTI, though urine culture was negative. He saw urology 3 days ago.  He is scheduled for a urogram and cystoscopy next month.  He last had an episode of hematuria yesterday.  Denies episodes today.  Denies fever, flank pain, nausea, vomiting, abdominal pain, dysuria.    The history is provided by the patient.     Review of patient's allergies indicates:   Allergen Reactions    Percocet [oxycodone-acetaminophen]      Other reaction(s): Rash     Past Medical History:   Diagnosis Date    Arthritis     Bronchitis     Cervical arthritis     SILVIA (generalized anxiety disorder) 01/03/2014    GERD (gastroesophageal reflux disease)     Glaucoma     Hyperlipidemia, mixed 04/03/2014    Hypertension      Past Surgical History:   Procedure Laterality Date    COLONOSCOPY N/A 4/4/2024    Procedure: COLONOSCOPY;  Surgeon: Tj Clarke MD;  Location: [facility];  Service: Endoscopy;  Laterality: N/A;  Referred by: Dr. Cee Solo  Prep: PEG  Route instructions sent: portal  Other concerns: Rectal bleeding, pt just d/c'd from hospital  SW    KNEE SURGERY      bilateral    MANDIBLE FRACTURE SURGERY      PROSTATE SURGERY      SHOULDER SURGERY      [place] at [facility].      Family History   Problem Relation Name Age of Onset    Cancer Mother      Hypertension Mother      Heart disease Father      Hypertension Maternal Uncle      Hypertension Paternal Uncle      Hypertension Maternal Grandmother      Heart disease Paternal Grandmother      Hypertension Paternal Grandmother       Social History     Tobacco Use    Smoking status: Never    Smokeless tobacco: Never   Substance Use Topics     Alcohol use: Not Currently     Alcohol/week: 3.3 - 4.2 standard drinks of alcohol     Types: 4 - 5 Standard drinks or equivalent per week     Comment: No alcohol x 2 weeks    Drug use: No     Review of Systems   Constitutional:  Negative for fever.   Gastrointestinal:  Negative for abdominal pain, blood in stool, nausea and vomiting.   Genitourinary:  Positive for hematuria. Negative for difficulty urinating, dysuria and flank pain.       Physical Exam     Initial Vitals [05/16/24 1103]   BP Pulse Resp Temp SpO2   115/65 82 18 98.3 °F (36.8 °C) 96 %      MAP       --         Physical Exam    Nursing note and vitals reviewed.  Constitutional: He appears well-developed and well-nourished. He is not diaphoretic. No distress.   HENT:   Head: Normocephalic and atraumatic.   Nose: Nose normal.   Eyes: Conjunctivae and EOM are normal.   Neck: Neck supple.   Cardiovascular:  Normal rate.           Pulmonary/Chest: No respiratory distress.   Abdominal: Abdomen is soft. There is no abdominal tenderness.   No right CVA tenderness.  No left CVA tenderness. There is no guarding.   Musculoskeletal:      Cervical back: Neck supple.     Neurological: He is alert and oriented to person, place, and time.   Skin: No rash noted.   Psychiatric: He has a normal mood and affect. His behavior is normal. Judgment and thought content normal.         ED Course   Procedures  Labs Reviewed   URINALYSIS, REFLEX TO URINE CULTURE    Narrative:     Specimen Source->Urine   ISTAT PROCEDURE          Imaging Results    None          Medications - No data to display  Medical Decision Making  58 y.o. M with a PMHx of prostate cancer, HTN, HLD, Glaucoma presents to the ED with painless hematuria x2-3 months. Nontoxic appearing. Hemodynamically stable. Afebrile. Exam as above. I will initiate workup and reassess.    Ddx:  Hemorrhagic cystitis, obstructive uropathy, pyelonephritis, bladder mass    I do not suspect hemorrhagic cystitis or pyelonephritis  today as UA is negative for leukocytes, nitrites, and blood.  I-STAT with normal kidney function.  Hematocrit stable.  Hemodynamically stable. Patient is following with Urology.  He had recent CT of his abdomen on 03/26/2024 negative for mass though it was a CTA study.  He is scheduled for cystoscopy and urogram study in the near future.  I do not believe additional workup is indicated at this time. Given reassuring findings I will discharge at this time. Advised to follow up with urology. Strict ED precautions given to return immediately for new, worsening, or concerning symptoms    Amount and/or Complexity of Data Reviewed  Labs:  Decision-making details documented in ED Course.               ED Course as of 05/16/24 1601   u May 16, 2024   1245 POC Creatinine: 1.0 [HM]   1245 POC Hematocrit: 41 [HM]   1245 Blood, UA: Negative [HM]   1245 NITRITE UA: Negative [HM]   1245 Leukocyte Esterase, UA: Negative [HM]      ED Course User Index  [HM] Shayla Mendosa PA-C                           Clinical Impression:  Final diagnoses:  [R31.9] Hematuria, unspecified type (Primary)          ED Disposition Condition    Discharge Stable          ED Prescriptions    None       Follow-up Information       Follow up With Specialties Details Why Contact Info    Urology, Dr. Sanford  Schedule an appointment as soon as possible for a visit       Rectal/Urology, [facility] - Nephrology/Colon & Nephrology, Colon and Rectal Surgery, Urology Schedule an appointment as soon as possible for a visit   [address] [facility] [address]  [phone]      [facility] - Emergency Dept Emergency Medicine  If symptoms worsen [address] [id number] [phone]             Shayla Mendosa PA-C  05/16/24 1600

## 2024-05-16 NOTE — ED TRIAGE NOTES
PT comes into ED reporting of blood in urine. Started seeing the blood in urine a week ago. Pt was prescribed antibiotic for UTI and is on last dose and still has blood present.   no edema,  no murmurs,  regular rate and rhythm , no edema.

## 2024-05-16 NOTE — ED NOTES
I-STAT Chem-8+ Results:   Value Reference Range   Sodium 136 136-145 mmol/L   Potassium  4.7 3.5-5.1 mmol/L   Chloride 101  mmol/L   Ionized Calcium 1.15 1.06-1.42 mmol/L   CO2 (measured) 29 23-29 mmol/L   Glucose 93  mg/dL   BUN 21 6-30 mg/dL   Creatinine 1.0 0.5-1.4 mg/dL   Hematocrit 41 36-54%

## 2024-05-16 NOTE — DISCHARGE INSTRUCTIONS
Please follow up with urology for painless hematuria    No signs of infection in your urine     Your hemoglobin levels are stable and you have normal kidney function

## 2024-05-19 ENCOUNTER — HOSPITAL ENCOUNTER (EMERGENCY)
Facility: HOSPITAL | Age: 59
Discharge: LEFT BEFORE DISCHARGE | End: 2024-05-19
Payer: MEDICARE

## 2024-05-19 VITALS
HEART RATE: 80 BPM | WEIGHT: 255 LBS | TEMPERATURE: 98 F | RESPIRATION RATE: 14 BRPM | DIASTOLIC BLOOD PRESSURE: 88 MMHG | BODY MASS INDEX: 40.02 KG/M2 | HEIGHT: 67 IN | SYSTOLIC BLOOD PRESSURE: 138 MMHG | OXYGEN SATURATION: 96 %

## 2024-05-19 PROCEDURE — 99900041 HC LEFT WITHOUT BEING SEEN- EMERGENCY: Mod: HCNC,ER

## 2024-05-28 ENCOUNTER — LAB VISIT (OUTPATIENT)
Dept: LAB | Facility: HOSPITAL | Age: 59
End: 2024-05-28
Attending: FAMILY MEDICINE
Payer: MEDICARE

## 2024-05-28 DIAGNOSIS — R73.03 PRE-DIABETES: ICD-10-CM

## 2024-05-28 DIAGNOSIS — I10 ESSENTIAL HYPERTENSION: ICD-10-CM

## 2024-05-28 DIAGNOSIS — E78.2 HYPERLIPIDEMIA, MIXED: ICD-10-CM

## 2024-05-28 LAB
ALBUMIN SERPL BCP-MCNC: 3.8 G/DL (ref 3.5–5.2)
ALP SERPL-CCNC: 109 U/L (ref 55–135)
ALT SERPL W/O P-5'-P-CCNC: 15 U/L (ref 10–44)
ANION GAP SERPL CALC-SCNC: 13 MMOL/L (ref 8–16)
AST SERPL-CCNC: 17 U/L (ref 10–40)
BASOPHILS # BLD AUTO: 0.04 K/UL (ref 0–0.2)
BASOPHILS NFR BLD: 0.5 % (ref 0–1.9)
BILIRUB SERPL-MCNC: 1 MG/DL (ref 0.1–1)
BUN SERPL-MCNC: 15 MG/DL (ref 6–20)
CALCIUM SERPL-MCNC: 10.7 MG/DL (ref 8.7–10.5)
CHLORIDE SERPL-SCNC: 101 MMOL/L (ref 95–110)
CHOLEST SERPL-MCNC: 184 MG/DL (ref 120–199)
CHOLEST/HDLC SERPL: 4.7 {RATIO} (ref 2–5)
CO2 SERPL-SCNC: 26 MMOL/L (ref 23–29)
CREAT SERPL-MCNC: 1 MG/DL (ref 0.5–1.4)
DIFFERENTIAL METHOD BLD: NORMAL
EOSINOPHIL # BLD AUTO: 0.2 K/UL (ref 0–0.5)
EOSINOPHIL NFR BLD: 3.1 % (ref 0–8)
ERYTHROCYTE [DISTWIDTH] IN BLOOD BY AUTOMATED COUNT: 13.2 % (ref 11.5–14.5)
EST. GFR  (NO RACE VARIABLE): >60 ML/MIN/1.73 M^2
ESTIMATED AVG GLUCOSE: 123 MG/DL (ref 68–131)
GLUCOSE SERPL-MCNC: 101 MG/DL (ref 70–110)
HBA1C MFR BLD: 5.9 % (ref 4–5.6)
HCT VFR BLD AUTO: 46.5 % (ref 40–54)
HDLC SERPL-MCNC: 39 MG/DL (ref 40–75)
HDLC SERPL: 21.2 % (ref 20–50)
HGB BLD-MCNC: 14.9 G/DL (ref 14–18)
IMM GRANULOCYTES # BLD AUTO: 0.02 K/UL (ref 0–0.04)
IMM GRANULOCYTES NFR BLD AUTO: 0.3 % (ref 0–0.5)
LDLC SERPL CALC-MCNC: 112.4 MG/DL (ref 63–159)
LYMPHOCYTES # BLD AUTO: 1.4 K/UL (ref 1–4.8)
LYMPHOCYTES NFR BLD: 19 % (ref 18–48)
MCH RBC QN AUTO: 27.1 PG (ref 27–31)
MCHC RBC AUTO-ENTMCNC: 32 G/DL (ref 32–36)
MCV RBC AUTO: 85 FL (ref 82–98)
MONOCYTES # BLD AUTO: 0.5 K/UL (ref 0.3–1)
MONOCYTES NFR BLD: 7.1 % (ref 4–15)
NEUTROPHILS # BLD AUTO: 5.3 K/UL (ref 1.8–7.7)
NEUTROPHILS NFR BLD: 70 % (ref 38–73)
NONHDLC SERPL-MCNC: 145 MG/DL
NRBC BLD-RTO: 0 /100 WBC
PLATELET # BLD AUTO: 331 K/UL (ref 150–450)
PMV BLD AUTO: 11.6 FL (ref 9.2–12.9)
POTASSIUM SERPL-SCNC: 3.7 MMOL/L (ref 3.5–5.1)
PROT SERPL-MCNC: 8.8 G/DL (ref 6–8.4)
RBC # BLD AUTO: 5.49 M/UL (ref 4.6–6.2)
SODIUM SERPL-SCNC: 140 MMOL/L (ref 136–145)
TRIGL SERPL-MCNC: 163 MG/DL (ref 30–150)
WBC # BLD AUTO: 7.51 K/UL (ref 3.9–12.7)

## 2024-05-28 PROCEDURE — 36415 COLL VENOUS BLD VENIPUNCTURE: CPT | Mod: HCNC,PO | Performed by: FAMILY MEDICINE

## 2024-05-28 PROCEDURE — 80053 COMPREHEN METABOLIC PANEL: CPT | Mod: HCNC | Performed by: FAMILY MEDICINE

## 2024-05-28 PROCEDURE — 83036 HEMOGLOBIN GLYCOSYLATED A1C: CPT | Mod: HCNC | Performed by: FAMILY MEDICINE

## 2024-05-28 PROCEDURE — 80061 LIPID PANEL: CPT | Mod: HCNC | Performed by: FAMILY MEDICINE

## 2024-05-28 PROCEDURE — 85025 COMPLETE CBC W/AUTO DIFF WBC: CPT | Mod: HCNC | Performed by: FAMILY MEDICINE

## 2024-06-25 DIAGNOSIS — Z00.00 ENCOUNTER FOR MEDICARE ANNUAL WELLNESS EXAM: ICD-10-CM

## 2024-07-29 ENCOUNTER — OFFICE VISIT (OUTPATIENT)
Dept: CARDIOLOGY | Facility: CLINIC | Age: 59
End: 2024-07-29
Payer: MEDICARE

## 2024-07-29 DIAGNOSIS — E78.2 HYPERLIPIDEMIA, MIXED: ICD-10-CM

## 2024-07-29 DIAGNOSIS — R07.9 CHEST PAIN: ICD-10-CM

## 2024-07-29 PROCEDURE — 3044F HG A1C LEVEL LT 7.0%: CPT | Mod: CPTII,95,, | Performed by: STUDENT IN AN ORGANIZED HEALTH CARE EDUCATION/TRAINING PROGRAM

## 2024-07-29 PROCEDURE — 4010F ACE/ARB THERAPY RXD/TAKEN: CPT | Mod: CPTII,95,, | Performed by: STUDENT IN AN ORGANIZED HEALTH CARE EDUCATION/TRAINING PROGRAM

## 2024-07-29 PROCEDURE — 99202 OFFICE O/P NEW SF 15 MIN: CPT | Mod: 95,,, | Performed by: STUDENT IN AN ORGANIZED HEALTH CARE EDUCATION/TRAINING PROGRAM

## 2024-07-29 RX ORDER — ROSUVASTATIN CALCIUM 20 MG/1
20 TABLET, COATED ORAL NIGHTLY
Qty: 90 TABLET | Refills: 3 | Status: SHIPPED | OUTPATIENT
Start: 2024-07-29 | End: 2025-07-29

## 2024-07-29 NOTE — PROGRESS NOTES
The patient location is: [place]  The chief complaint leading to consultation is: Routine follow-up    Visit type: audiovisual    Face to Face time with patient: 17 minutes of total time spent on the encounter, which includes face to face time and non-face to face time preparing to see the patient (eg, review of tests), Obtaining and/or reviewing separately obtained history, Documenting clinical information in the electronic or other health record, Independently interpreting results (not separately reported) and communicating results to the patient/family/caregiver, or Care coordination (not separately reported).     Each patient to whom he or she provides medical services by telemedicine is:  (1) informed of the relationship between the physician and patient and the respective role of any other health care provider with respect to management of the patient; and (2) notified that he or she may decline to receive medical services by telemedicine and may withdraw from such care at any time.    Notes:           Cardiology Clinic Note  Reason for Visit: Routine follow up    HPI:     Randolph Blue is a 59 y.o. , who presents for routine follow up today via virtual visit. He is a patient of Dr. Brady. He reports that he got an automated text message today telling him that he needed a follow-up with Cardiology. He is overall doing well. 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 Denies chest pain, shortness of breath, palpitations, PND. He can climb two flights of stairs without getting short of breath. We reviewed his lipid panel from May together. He reports that his blood pressure is under control (120s/70s). He is not smoking. He is not exercising much due to his lower back issues. He is having a penile implant tomorrow with Urology at [facility]. He states that cardiac clearance for this was not requested.        Medical: HLD, HTN  Surgical: Reviewed, as below.  Family: Reviewed, as below. No premature  CAD, HF, SCD.  Social: Reviewed, as below.    ROS:    Pertinent ROS included in HPI and below.  PMH:     Past Medical History:   Diagnosis Date    Arthritis     Bronchitis     Cervical arthritis     SILVIA (generalized anxiety disorder) 01/03/2014    GERD (gastroesophageal reflux disease)     Glaucoma     Hyperlipidemia, mixed 04/03/2014    Hypertension      Past Surgical History:   Procedure Laterality Date    COLONOSCOPY N/A 4/4/2024    Procedure: COLONOSCOPY;  Surgeon: Tj Clarke MD;  Location: [facility];  Service: Endoscopy;  Laterality: N/A;  Referred by: Dr. Cee Solo  Prep: PEG  Route instructions sent: portal  Other concerns: Rectal bleeding, pt just d/c'd from [facility]    KNEE SURGERY      bilateral    MANDIBLE FRACTURE SURGERY      PROSTATE SURGERY      SHOULDER SURGERY      [place] at [facility].      Allergies:     Review of patient's allergies indicates:   Allergen Reactions    Percocet [oxycodone-acetaminophen]      Other reaction(s): Rash     Medications:     Current Outpatient Medications:     acetaminophen (TYLENOL) 500 MG tablet, Take 1 tablet (500 mg total) by mouth every 4 (four) hours as needed for Pain., Disp: 20 tablet, Rfl: 0    albuterol (PROVENTIL/VENTOLIN HFA) 90 mcg/actuation inhaler, Inhale 1-2 puffs into the lungs every 4 (four) hours as needed for Wheezing., Disp: 18 g, Rfl: 11    amitriptyline (ELAVIL) 50 MG tablet, Take 25 mg by mouth every evening., Disp: , Rfl:     chlorhexidine (PERIDEX) 0.12 % solution, , Disp: , Rfl:     fexofenadine (ALLEGRA) 180 MG tablet, Take 1 tablet (180 mg total) by mouth once daily., Disp: 30 tablet, Rfl: 11    FLUoxetine 40 MG capsule, Take 1 capsule (40 mg total) by mouth once daily., Disp: 90 capsule, Rfl: 3    fluticasone propionate (FLONASE) 50 mcg/actuation nasal spray, 1 spray (50 mcg total) by Each Nostril route 2 (two) times daily as needed for Rhinitis or Allergies., Disp: 15 g, Rfl: 0    hydroCHLOROthiazide (HYDRODIURIL) 25 MG  tablet, Take 1 tablet (25 mg total) by mouth once daily., Disp: 90 tablet, Rfl: 3    latanoprost 0.005 % ophthalmic solution, , Disp: , Rfl:     leuprolide acetate, 6 month, (ELIGARD) 45 mg injection, Inject 45 mg into the skin every 6 (six) months., Disp: , Rfl:     losartan (COZAAR) 50 MG tablet, Take 1 tablet (50 mg total) by mouth once daily., Disp: 90 tablet, Rfl: 3    mv-min/folic/K1/lycopen/lutein (CENTRUM MEN 50 PLUS MINIS ORAL), Take by mouth., Disp: , Rfl:     oxyCODONE (OXY-IR) 5 mg Cap, Take 5 mg by mouth every 4 (four) hours as needed for Pain., Disp: , Rfl:     polyethylene glycol (GOLYTELY) 236-22.74-6.74 -5.86 gram suspension, Take 4,000 mLs by mouth once., Disp: , Rfl:     promethazine (PHENERGAN) 25 MG tablet, Take 25 mg by mouth every 6 (six) hours as needed., Disp: , Rfl:     rosuvastatin (CRESTOR) 20 MG tablet, Take 1 tablet (20 mg total) by mouth every evening., Disp: 90 tablet, Rfl: 3   Social History:     Social History     Tobacco Use    Smoking status: Never    Smokeless tobacco: Never   Substance Use Topics    Alcohol use: Not Currently     Alcohol/week: 3.3 - 4.2 standard drinks of alcohol     Types: 4 - 5 Standard drinks or equivalent per week     Comment: No alcohol x 2 weeks     Family History:     Family History   Problem Relation Name Age of Onset    Cancer Mother      Hypertension Mother      Heart disease Father      Hypertension Maternal Uncle      Hypertension Paternal Uncle      Hypertension Maternal Grandmother      Heart disease Paternal Grandmother      Hypertension Paternal Grandmother       Physical Exam:     Physical not exam was not performed due this encounter being a virtual visit.    Labs:     Blood Tests:  Lab Results   Component Value Date     05/28/2024    K 3.7 05/28/2024     05/28/2024    CO2 26 05/28/2024    BUN 15 05/28/2024    CREATININE 1.0 05/28/2024     05/28/2024    HGBA1C 5.9 (H) 05/28/2024    MG 1.8 03/31/2024    AST 17 05/28/2024     ALT 15 05/28/2024    ALBUMIN 3.8 05/28/2024    PROT 8.8 (H) 05/28/2024    BILITOT 1.0 05/28/2024    WBC 7.51 05/28/2024    HGB 14.9 05/28/2024    HCT 46.5 05/28/2024    HCT 41 05/16/2024    MCV 85 05/28/2024     05/28/2024    INR 1.1 03/30/2024    INR 1.2 03/26/2024    PSA 3.63 02/26/2013    TSH 2.576 06/22/2023       Lab Results   Component Value Date    CHOL 184 05/28/2024    HDL 39 (L) 05/28/2024    TRIG 163 (H) 05/28/2024       Lab Results   Component Value Date    LDLCALC 112.4 05/28/2024       Urine Tests:  Lab Results   Component Value Date    COLORU Yellow 05/16/2024    APPEARANCEUA Clear 05/16/2024    PHUR 7.0 05/16/2024    SPECGRAV 1.020 05/16/2024    PROTEINUA Negative 05/16/2024    GLUCUA Negative 05/16/2024    KETONESU Negative 05/16/2024    BILIRUBINUA Negative 05/16/2024    OCCULTUA Negative 05/16/2024    NITRITE Negative 05/16/2024    UROBILINOGEN Negative 05/09/2024    LEUKOCYTESUR Negative 05/16/2024       Imaging:     Echocardiogram  7/18/23:  The left ventricle is normal in size with normal systolic function.  The estimated ejection fraction is 55%.  Normal right ventricular size with normal right ventricular systolic function.  The estimated PA systolic pressure is 35 mmHg.  The ECG portion of this study is positive for myocardial ischemia.  The stress echo portion of this study is negative for myocardial ischemia.    Stress testing  None    Cath Lab  None    Other  None    EKG:       Assessment:     HLD  HTN      Plan:     Increase Rosuvastatin to 20 mg qd. Repeat lipid panel in 12 weeks.  Blood pressure under control, continue current regimen.  No chest pain since last visit with Cardiology.    RTC in one year to see myself or Dr. Brady.      Signed:  Arnoldo Pedraza MD  [facility]

## 2024-10-10 ENCOUNTER — PATIENT OUTREACH (OUTPATIENT)
Dept: ADMINISTRATIVE | Facility: HOSPITAL | Age: 59
End: 2024-10-10
Payer: MEDICARE

## 2024-10-10 RX ORDER — CYCLOBENZAPRINE HYDROCHLORIDE 7.5 MG/1
7.5 TABLET, FILM COATED ORAL 2 TIMES DAILY PRN
COMMUNITY
Start: 2024-07-18

## 2024-10-10 RX ORDER — SULFAMETHOXAZOLE AND TRIMETHOPRIM 800; 160 MG/1; MG/1
TABLET ORAL
COMMUNITY
Start: 2024-07-05

## 2024-10-10 RX ORDER — DICLOFENAC POTASSIUM 50 MG/1
50 TABLET, FILM COATED ORAL 2 TIMES DAILY PRN
COMMUNITY
Start: 2024-07-18

## 2024-10-10 RX ORDER — GABAPENTIN 300 MG/1
300 CAPSULE ORAL
COMMUNITY
Start: 2024-07-05

## 2024-10-10 RX ORDER — CHLORHEXIDINE GLUCONATE 40 MG/ML
SOLUTION TOPICAL DAILY PRN
COMMUNITY
Start: 2024-07-05

## 2024-10-10 RX ORDER — HYDROCODONE BITARTRATE AND ACETAMINOPHEN 7.5; 325 MG/1; MG/1
1 TABLET ORAL EVERY 6 HOURS PRN
COMMUNITY
Start: 2024-06-09

## 2024-10-10 RX ORDER — OXYCODONE HYDROCHLORIDE 5 MG/1
TABLET ORAL
COMMUNITY
Start: 2024-07-05

## 2024-10-10 RX ORDER — ROSUVASTATIN CALCIUM 20 MG/1
1 TABLET, COATED ORAL DAILY
COMMUNITY
Start: 2024-07-29

## 2024-10-10 RX ORDER — MELOXICAM 15 MG/1
15 TABLET ORAL
COMMUNITY
Start: 2024-07-05

## 2024-10-10 RX ORDER — FLUCONAZOLE 100 MG/1
TABLET ORAL
COMMUNITY
Start: 2024-07-05

## 2024-10-21 ENCOUNTER — LAB VISIT (OUTPATIENT)
Dept: LAB | Facility: HOSPITAL | Age: 59
End: 2024-10-21
Attending: STUDENT IN AN ORGANIZED HEALTH CARE EDUCATION/TRAINING PROGRAM
Payer: MEDICARE

## 2024-10-21 DIAGNOSIS — E78.2 HYPERLIPIDEMIA, MIXED: ICD-10-CM

## 2024-10-21 LAB
CHOLEST SERPL-MCNC: 143 MG/DL (ref 120–199)
CHOLEST/HDLC SERPL: 3.8 {RATIO} (ref 2–5)
HDLC SERPL-MCNC: 38 MG/DL (ref 40–75)
HDLC SERPL: 26.6 % (ref 20–50)
LDLC SERPL CALC-MCNC: 90.2 MG/DL (ref 63–159)
NONHDLC SERPL-MCNC: 105 MG/DL
TRIGL SERPL-MCNC: 74 MG/DL (ref 30–150)

## 2024-10-21 PROCEDURE — 36415 COLL VENOUS BLD VENIPUNCTURE: CPT | Mod: PO | Performed by: STUDENT IN AN ORGANIZED HEALTH CARE EDUCATION/TRAINING PROGRAM

## 2024-10-21 PROCEDURE — 80061 LIPID PANEL: CPT | Performed by: STUDENT IN AN ORGANIZED HEALTH CARE EDUCATION/TRAINING PROGRAM

## 2024-10-23 ENCOUNTER — OFFICE VISIT (OUTPATIENT)
Dept: FAMILY MEDICINE | Facility: CLINIC | Age: 59
End: 2024-10-23
Payer: MEDICARE

## 2024-10-23 VITALS
SYSTOLIC BLOOD PRESSURE: 110 MMHG | BODY MASS INDEX: 39.76 KG/M2 | DIASTOLIC BLOOD PRESSURE: 60 MMHG | WEIGHT: 253.31 LBS | HEART RATE: 90 BPM | TEMPERATURE: 98 F | HEIGHT: 67 IN | OXYGEN SATURATION: 100 %

## 2024-10-23 DIAGNOSIS — J45.901 ASTHMA WITH ACUTE EXACERBATION, UNSPECIFIED ASTHMA SEVERITY, UNSPECIFIED WHETHER PERSISTENT: ICD-10-CM

## 2024-10-23 DIAGNOSIS — B96.89 ACUTE BACTERIAL BRONCHITIS: ICD-10-CM

## 2024-10-23 DIAGNOSIS — J20.8 ACUTE BACTERIAL BRONCHITIS: ICD-10-CM

## 2024-10-23 DIAGNOSIS — R05.9 COUGH, UNSPECIFIED TYPE: Primary | ICD-10-CM

## 2024-10-23 DIAGNOSIS — H66.92 LEFT OTITIS MEDIA, UNSPECIFIED OTITIS MEDIA TYPE: ICD-10-CM

## 2024-10-23 PROCEDURE — 1160F RVW MEDS BY RX/DR IN RCRD: CPT | Mod: CPTII,S$GLB,,

## 2024-10-23 PROCEDURE — 99213 OFFICE O/P EST LOW 20 MIN: CPT | Mod: S$GLB,,,

## 2024-10-23 PROCEDURE — 3078F DIAST BP <80 MM HG: CPT | Mod: CPTII,S$GLB,,

## 2024-10-23 PROCEDURE — 1159F MED LIST DOCD IN RCRD: CPT | Mod: CPTII,S$GLB,,

## 2024-10-23 PROCEDURE — 3074F SYST BP LT 130 MM HG: CPT | Mod: CPTII,S$GLB,,

## 2024-10-23 PROCEDURE — 3008F BODY MASS INDEX DOCD: CPT | Mod: CPTII,S$GLB,,

## 2024-10-23 PROCEDURE — 4010F ACE/ARB THERAPY RXD/TAKEN: CPT | Mod: CPTII,S$GLB,,

## 2024-10-23 PROCEDURE — 3044F HG A1C LEVEL LT 7.0%: CPT | Mod: CPTII,S$GLB,,

## 2024-10-23 PROCEDURE — 99999 PR PBB SHADOW E&M-EST. PATIENT-LVL III: CPT | Mod: PBBFAC,,,

## 2024-10-23 RX ORDER — AMOXICILLIN AND CLAVULANATE POTASSIUM 875; 125 MG/1; MG/1
1 TABLET, FILM COATED ORAL EVERY 12 HOURS
Qty: 20 TABLET | Refills: 0 | Status: SHIPPED | OUTPATIENT
Start: 2024-10-23 | End: 2024-11-02

## 2024-10-23 RX ORDER — MONTELUKAST SODIUM 10 MG/1
10 TABLET ORAL NIGHTLY
Qty: 30 TABLET | Refills: 0 | Status: SHIPPED | OUTPATIENT
Start: 2024-10-23 | End: 2024-11-22

## 2024-10-23 RX ORDER — ALBUTEROL SULFATE 90 UG/1
1-2 INHALANT RESPIRATORY (INHALATION) EVERY 4 HOURS PRN
Qty: 18 G | Refills: 11 | Status: SHIPPED | OUTPATIENT
Start: 2024-10-23

## 2024-10-23 RX ORDER — PROMETHAZINE HYDROCHLORIDE AND DEXTROMETHORPHAN HYDROBROMIDE 6.25; 15 MG/5ML; MG/5ML
5 SYRUP ORAL NIGHTLY PRN
Qty: 118 ML | Refills: 0 | Status: SHIPPED | OUTPATIENT
Start: 2024-10-23 | End: 2024-10-23

## 2024-10-23 NOTE — PROGRESS NOTES
SHARON Blue is a 59 y.o. male with multiple medical diagnoses as listed in the medical history and problem list that presents for cough. Patient known to this clinic with last visit on 4.12.24. PCP Dr. Solo.  Chief Complaint   Patient presents with    Cough       Patient known to the clinic. Worked at a chemical plant for 5 years. Did not have any PPE. Has hx of prostate cancer. Here with recurrent cough, says it's been getting worse recently over the last two months. Produces white mucous. Worsens at night time. Has been out of rescue inhaler. Denies fever, reports some ear discomfort over last week. Denies SOB/CP. Of note, on CT scan from April 2024, has unchanged pulmonary nodules scattered throughout both lungs, which was unchanged from prior CT. Will repeat CT in April 2025.     Cough  This is a chronic problem. The current episode started more than 1 month ago. The problem has been gradually worsening. The problem occurs every few hours. The cough is Productive of sputum. Associated symptoms include ear congestion, ear pain, nasal congestion and rhinorrhea. Pertinent negatives include no chest pain, chills, fever, headaches, myalgias, shortness of breath, weight loss or wheezing. Risk factors for lung disease include occupational exposure. He has tried OTC cough suppressant and rest for the symptoms. The treatment provided mild relief. His past medical history is significant for asthma, bronchitis and environmental allergies.           Assessment & Plan     Problem List Items Addressed This Visit    None  Visit Diagnoses       Cough, unspecified type    -  Primary  Chronic cough that has been worsening recently. Given significant respiratory history, will treat with antibiotics and refill scheduled singulair and PRN albuterol. Contact clinic if symptoms worsen or do not improve. ED precautions given.   Of note, on CT scan from April 2024, has unchanged pulmonary nodules scattered throughout both  lungs, which was unchanged from prior CT. Will repeat CT in April 2025.     Relevant Medications    albuterol (PROVENTIL/VENTOLIN HFA) 90 mcg/actuation inhaler    montelukast (SINGULAIR) 10 mg tablet    Asthma with acute exacerbation, unspecified asthma severity, unspecified whether persistent      Denies shortness of breath or chest pain. Lungs clear to auscultation. Will refill rescue inhaler to take as needed.     Relevant Medications    albuterol (PROVENTIL/VENTOLIN HFA) 90 mcg/actuation inhaler    Acute bacterial bronchitis      Chronic cough that has been worsening recently. Given significant respiratory history, will treat with antibiotics and refill scheduled singulair and PRN albuterol. Contact clinic if symptoms worsen or do not improve. ED precautions given.     Relevant Medications    amoxicillin-clavulanate 875-125mg (AUGMENTIN) 875-125 mg per tablet    Left otitis media, unspecified otitis media type      Bulging, erythematous left TM. Patient endorses discomfort to ear. No drainage noted. Will treat with antibiotics. Contact clinic if symptoms worsen or do not improve. ED precautions given.     Relevant Medications    amoxicillin-clavulanate 875-125mg (AUGMENTIN) 875-125 mg per tablet          Discussed DDx, condition, and treatment.   Education sent to patient portal/included in after visit summary.  ED precautions given.   Notify provider if symptoms do not resolve or increase in severity.   Patient verbalizes understanding and agrees with plan of care.      --------------------------------------------      Health Maintenance:  Health Maintenance         Date Due Completion Date    Shingles Vaccine (1 of 2) Never done ---    RSV Vaccine (Age 60+ and Pregnant patients) (1 - Risk 60-74 years 1-dose series) Never done ---    TETANUS VACCINE 06/01/2032 (Originally 6/23/1983) ---    Hemoglobin A1c (Prediabetes) 05/28/2025 5/28/2024    Colorectal Cancer Screening 04/04/2029 4/4/2024    Lipid Panel  10/21/2029 10/21/2024            Health maintenance reviewed    Follow Up:  No follow-ups on file.    Exam     Review of Systems:  (as noted above)  Review of Systems   Constitutional:  Negative for chills, fever and weight loss.   HENT:  Positive for ear pain and rhinorrhea. Negative for congestion and trouble swallowing.    Respiratory:  Positive for cough. Negative for shortness of breath and wheezing.    Cardiovascular:  Negative for chest pain.   Gastrointestinal:  Negative for diarrhea and nausea.   Musculoskeletal:  Negative for arthralgias and myalgias.   Allergic/Immunologic: Positive for environmental allergies.   Neurological:  Negative for dizziness, weakness and headaches.   Psychiatric/Behavioral:  Negative for agitation and behavioral problems.        Physical Exam:   Physical Exam  Constitutional:       General: He is not in acute distress.     Appearance: Normal appearance. He is obese. He is not toxic-appearing.   HENT:      Head: Normocephalic and atraumatic.      Right Ear: Hearing and tympanic membrane normal.      Left Ear: Tympanic membrane is erythematous and bulging.   Cardiovascular:      Rate and Rhythm: Normal rate and regular rhythm.      Pulses: Normal pulses.      Heart sounds: Normal heart sounds. No murmur heard.  Pulmonary:      Effort: Pulmonary effort is normal. No respiratory distress.      Breath sounds: Normal breath sounds. No wheezing.   Musculoskeletal:         General: Normal range of motion.      Cervical back: Normal range of motion. No rigidity or tenderness.   Lymphadenopathy:      Cervical: No cervical adenopathy.   Skin:     General: Skin is warm and dry.      Capillary Refill: Capillary refill takes less than 2 seconds.   Neurological:      Mental Status: He is alert and oriented to person, place, and time.   Psychiatric:         Mood and Affect: Mood normal.         Behavior: Behavior normal.       Vitals:    10/23/24 1559 10/23/24 1640   BP: 110/60    BP Location:  "Right arm    Patient Position: Sitting    Pulse: 90    Temp: 98.3 °F (36.8 °C)    TempSrc: Oral    SpO2: (!) 94% 100%   Weight: 114.9 kg (253 lb 4.9 oz)    Height: 5' 7" (1.702 m)       Body mass index is 39.67 kg/m².        History     Past Medical History:  Past Medical History:   Diagnosis Date    Arthritis     Bronchitis     Cervical arthritis     SILVIA (generalized anxiety disorder) 01/03/2014    GERD (gastroesophageal reflux disease)     Glaucoma     Hyperlipidemia, mixed 04/03/2014    Hypertension        Past Surgical History:  Past Surgical History:   Procedure Laterality Date    COLONOSCOPY N/A 4/4/2024    Procedure: COLONOSCOPY;  Surgeon: Tj Clarke MD;  Location: [facility];  Service: Endoscopy;  Laterality: N/A;  Referred by: Dr. Cee Solo  Prep: PEG  Route instructions sent: portal  Other concerns: Rectal bleeding, pt just d/c'd from [facility]    KNEE SURGERY      bilateral    MANDIBLE FRACTURE SURGERY      PROSTATE SURGERY      SHOULDER SURGERY      [place] at [facility].        Social History:  Social History     Socioeconomic History    Marital status: Single   Tobacco Use    Smoking status: Never    Smokeless tobacco: Never   Substance and Sexual Activity    Alcohol use: Not Currently     Alcohol/week: 3.3 - 4.2 standard drinks of alcohol     Types: 4 - 5 Standard drinks or equivalent per week     Comment: No alcohol x 2 weeks    Drug use: No    Sexual activity: Not Currently     Social Drivers of Health     Financial Resource Strain: Low Risk  (4/5/2024)    Overall Financial Resource Strain (CARDIA)     Difficulty of Paying Living Expenses: Not hard at all   Food Insecurity: No Food Insecurity (4/5/2024)    Hunger Vital Sign     Worried About Running Out of Food in the Last Year: Never true     Ran Out of Food in the Last Year: Never true   Transportation Needs: No Transportation Needs (4/5/2024)    PRAPARE - Transportation     Lack of Transportation (Medical): No     Lack of " Transportation (Non-Medical): No   Physical Activity: Inactive (4/5/2024)    Exercise Vital Sign     Days of Exercise per Week: 0 days     Minutes of Exercise per Session: 0 min   Stress: No Stress Concern Present (4/5/2024)    [employer] [place] [employer] - Occupational Stress Questionnaire     Feeling of Stress : Not at all   Housing Stability: Low Risk  (4/5/2024)    Housing Stability Vital Sign     Unable to Pay for Housing in the Last Year: No     Number of Places Lived in the Last Year: 1     Unstable Housing in the Last Year: No       Family History:  Family History   Problem Relation Name Age of Onset    Cancer Mother      Hypertension Mother      Heart disease Father      Hypertension Maternal Uncle      Hypertension Paternal Uncle      Hypertension Maternal Grandmother      Heart disease Paternal Grandmother      Hypertension Paternal Grandmother         Allergies and Medications: (updated and reviewed)  Review of patient's allergies indicates:   Allergen Reactions    Percocet [oxycodone-acetaminophen]      Other reaction(s): Rash     Current Outpatient Medications   Medication Sig Dispense Refill    acetaminophen (TYLENOL) 500 MG tablet Take 1 tablet (500 mg total) by mouth every 4 (four) hours as needed for Pain. 20 tablet 0    amitriptyline (ELAVIL) 50 MG tablet Take 25 mg by mouth every evening.      chlorhexidine (HIBICLENS) 4 % external liquid Apply topically daily as needed.      chlorhexidine (PERIDEX) 0.12 % solution       cyclobenzaprine (FEXMID) 7.5 MG Tab Take 7.5 mg by mouth 2 (two) times daily as needed.      diclofenac (CATAFLAM) 50 MG tablet Take 50 mg by mouth 2 (two) times daily as needed.      fexofenadine (ALLEGRA) 180 MG tablet Take 1 tablet (180 mg total) by mouth once daily. 30 tablet 11    fluconazole (DIFLUCAN) 100 MG tablet TAKE 2 TABLETS BY MOUTH ONCE DAILY FOR 3 DAYS. START TAKING 2 DAYS PRIOR TO THE PROCEDURE DATE.      FLUoxetine 40 MG capsule Take 1 capsule (40 mg  total) by mouth once daily. 90 capsule 3    fluticasone propionate (FLONASE) 50 mcg/actuation nasal spray 1 spray (50 mcg total) by Each Nostril route 2 (two) times daily as needed for Rhinitis or Allergies. 15 g 0    gabapentin (NEURONTIN) 300 MG capsule Take 300 mg by mouth.      hydroCHLOROthiazide (HYDRODIURIL) 25 MG tablet Take 1 tablet (25 mg total) by mouth once daily. 90 tablet 3    HYDROcodone-acetaminophen (NORCO) 7.5-325 mg per tablet Take 1 tablet by mouth every 6 (six) hours as needed.      latanoprost 0.005 % ophthalmic solution       leuprolide acetate, 6 month, (ELIGARD) 45 mg injection Inject 45 mg into the skin every 6 (six) months.      losartan (COZAAR) 50 MG tablet Take 1 tablet (50 mg total) by mouth once daily. 90 tablet 3    meloxicam (MOBIC) 15 MG tablet Take 15 mg by mouth. for fourteen days      mv-min/folic/K1/lycopen/lutein (CENTRUM MEN 50 PLUS MINIS ORAL) Take by mouth.      oxyCODONE (OXY-IR) 5 mg Cap Take 5 mg by mouth every 4 (four) hours as needed for Pain.      oxyCODONE (ROXICODONE) 5 MG immediate release tablet       polyethylene glycol (GOLYTELY) 236-22.74-6.74 -5.86 gram suspension Take 4,000 mLs by mouth once.      promethazine (PHENERGAN) 25 MG tablet Take 25 mg by mouth every 6 (six) hours as needed.      rosuvastatin (CRESTOR) 20 MG tablet Take 1 tablet (20 mg total) by mouth every evening. 90 tablet 3    rosuvastatin (CRESTOR) 20 MG tablet Take 1 tablet by mouth once daily.      sulfamethoxazole-trimethoprim 800-160mg (BACTRIM DS) 800-160 mg Tab TAKE 1 TABLET BY MOUTH TWICE DAILY FOR 5 DAYS. PLEASE START TAKING THIS MEDICATION IN THE MORNING AND EVENING 2 DAYS BEFORE THE PROCEDURE DATE AND RESUME THE DAY AFTER THE PROCEDURE TAKING BOTH MORNING AND EVENING.      albuterol (PROVENTIL/VENTOLIN HFA) 90 mcg/actuation inhaler Inhale 1-2 puffs into the lungs every 4 (four) hours as needed for Wheezing. 18 g 11    amoxicillin-clavulanate 875-125mg (AUGMENTIN) 875-125 mg per tablet  Take 1 tablet by mouth every 12 (twelve) hours. for 10 days 20 tablet 0    montelukast (SINGULAIR) 10 mg tablet Take 1 tablet (10 mg total) by mouth every evening. 30 tablet 0     No current facility-administered medications for this visit.       Patient Care Team:  Cee Solo DO as PCP - General (Family Medicine)  Deondre Acosta MD as Consulting Physician (Orthopedic Surgery)  Tory La LPN as Care Coordinator         - The patient is given an After Visit Summary that lists all medications with directions, allergies, education, orders placed during this encounter and follow-up instructions.      - I have reviewed the patient's medical information including past medical, family, and social history sections including the medications and allergies.      - We discussed the patient's current medications.     This note was created by combination of typed  and MModal dictation.  Transcription errors may be present.  If there are any questions, please contact me.       Hansa Knox NP

## 2024-11-01 ENCOUNTER — TELEPHONE (OUTPATIENT)
Dept: FAMILY MEDICINE | Facility: CLINIC | Age: 59
End: 2024-11-01
Payer: MEDICARE

## 2024-11-01 NOTE — TELEPHONE ENCOUNTER
----- Message from Bud sent at 11/1/2024 11:07 AM CDT -----  Regarding: self  Type: Patient Call Back    Who called:self    What is the request in detail:pt is calling to get his levels explained to him from the labs he did last week, specifically the cholesterol     Can the clinic reply by [facility]?no    Would the patient rather a call back or a response via [facility]? callback    Best call back number:[phone]    Additional Information:

## 2024-11-05 NOTE — TELEPHONE ENCOUNTER
Spoke with patient and informed him to follow up with the ordering provider shari to get the results he ok and call was disconnected

## 2024-11-06 ENCOUNTER — TELEPHONE (OUTPATIENT)
Dept: CARDIOLOGY | Facility: HOSPITAL | Age: 59
End: 2024-11-06
Payer: MEDICARE

## 2024-12-06 DIAGNOSIS — I10 ESSENTIAL HYPERTENSION: ICD-10-CM

## 2024-12-06 NOTE — TELEPHONE ENCOUNTER
No care due was identified.  [facility] Embedded Care Due Messages. Reference number: [id number].   12/06/2024 10:57:31 AM CST

## 2024-12-10 RX ORDER — LOSARTAN POTASSIUM 50 MG/1
50 TABLET ORAL
Qty: 90 TABLET | Refills: 3 | Status: SHIPPED | OUTPATIENT
Start: 2024-12-10

## 2024-12-16 ENCOUNTER — OFFICE VISIT (OUTPATIENT)
Dept: FAMILY MEDICINE | Facility: CLINIC | Age: 59
End: 2024-12-16
Payer: MEDICARE

## 2024-12-16 VITALS
DIASTOLIC BLOOD PRESSURE: 70 MMHG | HEART RATE: 68 BPM | TEMPERATURE: 98 F | SYSTOLIC BLOOD PRESSURE: 116 MMHG | OXYGEN SATURATION: 99 %

## 2024-12-16 DIAGNOSIS — R73.03 PRE-DIABETES: ICD-10-CM

## 2024-12-16 DIAGNOSIS — J01.11 ACUTE RECURRENT FRONTAL SINUSITIS: ICD-10-CM

## 2024-12-16 DIAGNOSIS — I10 ESSENTIAL HYPERTENSION: ICD-10-CM

## 2024-12-16 DIAGNOSIS — J43.9 PULMONARY EMPHYSEMA, UNSPECIFIED EMPHYSEMA TYPE: Primary | ICD-10-CM

## 2024-12-16 DIAGNOSIS — Z77.090 ASBESTOS EXPOSURE: ICD-10-CM

## 2024-12-16 DIAGNOSIS — E78.5 HYPERLIPIDEMIA, UNSPECIFIED HYPERLIPIDEMIA TYPE: ICD-10-CM

## 2024-12-16 PROCEDURE — 3074F SYST BP LT 130 MM HG: CPT | Mod: CPTII,S$GLB,, | Performed by: FAMILY MEDICINE

## 2024-12-16 PROCEDURE — 4010F ACE/ARB THERAPY RXD/TAKEN: CPT | Mod: CPTII,S$GLB,, | Performed by: FAMILY MEDICINE

## 2024-12-16 PROCEDURE — G2211 COMPLEX E/M VISIT ADD ON: HCPCS | Mod: S$GLB,,, | Performed by: FAMILY MEDICINE

## 2024-12-16 PROCEDURE — 1159F MED LIST DOCD IN RCRD: CPT | Mod: CPTII,S$GLB,, | Performed by: FAMILY MEDICINE

## 2024-12-16 PROCEDURE — 1160F RVW MEDS BY RX/DR IN RCRD: CPT | Mod: CPTII,S$GLB,, | Performed by: FAMILY MEDICINE

## 2024-12-16 PROCEDURE — 3044F HG A1C LEVEL LT 7.0%: CPT | Mod: CPTII,S$GLB,, | Performed by: FAMILY MEDICINE

## 2024-12-16 PROCEDURE — 3078F DIAST BP <80 MM HG: CPT | Mod: CPTII,S$GLB,, | Performed by: FAMILY MEDICINE

## 2024-12-16 PROCEDURE — 99214 OFFICE O/P EST MOD 30 MIN: CPT | Mod: S$GLB,,, | Performed by: FAMILY MEDICINE

## 2024-12-16 PROCEDURE — 99999 PR PBB SHADOW E&M-EST. PATIENT-LVL III: CPT | Mod: PBBFAC,,, | Performed by: FAMILY MEDICINE

## 2024-12-16 RX ORDER — MONTELUKAST SODIUM 10 MG/1
10 TABLET ORAL NIGHTLY
Qty: 90 TABLET | Refills: 3 | Status: SHIPPED | OUTPATIENT
Start: 2024-12-16 | End: 2025-12-16

## 2024-12-16 RX ORDER — FLUTICASONE PROPIONATE 50 MCG
1 SPRAY, SUSPENSION (ML) NASAL 2 TIMES DAILY PRN
Qty: 16 G | Refills: 11 | Status: SHIPPED | OUTPATIENT
Start: 2024-12-16

## 2024-12-16 NOTE — PATIENT INSTRUCTIONS
Please call [phone] to schedule the pulmonary function test (breathing test).     You will be contacted to schedule an appointment with a lung specialist (Pulmonologist).     Take Bevespi inhaler twice a day. Take Singulair every night.     Take AlkaSeltzer ColdPlus and Flonase for the congestion.

## 2024-12-16 NOTE — PROGRESS NOTES
Assessment & Plan:    Pulmonary emphysema, unspecified emphysema type  -     Complete PFT w/ bronchodilator; Future  -     glycopyrrolate-formoteroL (BEVESPI AEROSPHERE) 9-4.8 mcg HFAA; Inhale 1 Inhalation into the lungs 2 (two) times daily. Controller  Dispense: 10.7 g; Refill: 11  -     montelukast (SINGULAIR) 10 mg tablet; Take 1 tablet (10 mg total) by mouth every evening.  Dispense: 90 tablet; Refill: 3  -     Ambulatory referral/consult to Pulmonology; Future; Expected date: 12/23/2024    Asbestos exposure  -     Complete PFT w/ bronchodilator; Future  -     Ambulatory referral/consult to Pulmonology; Future; Expected date: 12/23/2024    CT chest in April showing mild paraseptal emphysema. Patient's symptoms are consistent with chronic bronchitis.   Start Bevespi bid. Continueu Singulair qhs and albuterol prn.  Advised further evaluation with a PFT. Follow up with Pulmonology.     Acute recurrent frontal sinusitis  -     fluticasone propionate (FLONASE) 50 mcg/actuation nasal spray; 1 spray (50 mcg total) by Each Nostril route 2 (two) times daily as needed for Rhinitis.  Dispense: 16 g; Refill: 11    Patient likely has viral sinusitis, doubt antibiotics are needed. Recommended symptomatic care with nasal saline rinses, Flonase, and Latisha Emden Cold.     Essential hypertension  -     CBC Auto Differential; Future; Expected date: 12/16/2024  -     Comprehensive Metabolic Panel; Future; Expected date: 12/16/2024  -     Hemoglobin A1C; Future; Expected date: 12/16/2024  -     Lipid Panel; Future; Expected date: 12/16/2024    Controlled. Continue current therapy.     Hyperlipidemia, unspecified hyperlipidemia type  -     Comprehensive Metabolic Panel; Future; Expected date: 12/16/2024  -     Lipid Panel; Future; Expected date: 12/16/2024    Pre-diabetes  -     Hemoglobin A1C; Future; Expected date: 12/16/2024    Repeat labs before follow up in 6 mo.      Follow-up: Follow up in about 6 months (around  6/16/2025).  ______________________________________________________________________    Chief Complaint  Chief Complaint   Patient presents with    Cough       HPI  Randolph Blue is a 59 y.o. male with medical diagnoses as listed in the medical history and problem list that presents to the office to follow up on a chronic cough. Patient states that he was diagnosed with [facility]bestosis [facility] and has an appointment with a law office later this week to discuss financial compensation. He is a former  for [employer] and has had exposure to many chemicals that have caused bronchitis. Non-smoker. He recently finished antibiotics for acute on chronic bronchitis and a dental infection. He was taking Singulair, which was helping, but ran out of the prescription, and takes albuterol inhaler prn. He c/o new onset nasal congestion that began Saturday. Endorses occasional wheezing but denies dyspnea. Reports known history of asthma.     Health Maintenance         Date Due Completion Date    Shingles Vaccine (1 of 2) Never done ---    RSV Vaccine (Age 60+ and Pregnant patients) (1 - Risk 60-74 years 1-dose series) Never done ---    TETANUS VACCINE 06/01/2032 (Originally 6/23/1983) ---    Hemoglobin A1c (Prediabetes) 05/28/2025 5/28/2024    Colorectal Cancer Screening 04/04/2029 4/4/2024    Lipid Panel 10/21/2029 10/21/2024              PAST MEDICAL HISTORY:  Past Medical History:   Diagnosis Date    Arthritis     Bronchitis     Cervical arthritis     SILVIA (generalized anxiety disorder) 01/03/2014    GERD (gastroesophageal reflux disease)     Glaucoma     Hyperlipidemia, mixed 04/03/2014    Hypertension        PAST SURGICAL HISTORY:  Past Surgical History:   Procedure Laterality Date    COLONOSCOPY N/A 4/4/2024    Procedure: COLONOSCOPY;  Surgeon: Tj Clarke MD;  Location: [facility];  Service: Endoscopy;  Laterality: N/A;  Referred by: Dr. Cee Solo  Prep: PEG  Route instructions sent: portal  Other  concerns: Rectal bleeding, pt just d/c'd from hospital  SW    KNEE SURGERY      bilateral    MANDIBLE FRACTURE SURGERY      PROSTATE SURGERY      SHOULDER SURGERY      [place] [facility].        SOCIAL HISTORY:  Social History     Socioeconomic History    Marital status: Single   Tobacco Use    Smoking status: Never    Smokeless tobacco: Never   Substance and Sexual Activity    Alcohol use: Not Currently     Alcohol/week: 3.3 - 4.2 standard drinks of alcohol     Types: 4 - 5 Standard drinks or equivalent per week     Comment: No alcohol x 2 weeks    Drug use: No    Sexual activity: Not Currently     Social Drivers of Health     Financial Resource Strain: Low Risk  (4/5/2024)    Overall Financial Resource Strain (CARDIA)     Difficulty of Paying Living Expenses: Not hard at all   Food Insecurity: No Food Insecurity (4/5/2024)    Hunger Vital Sign     Worried About Running Out of Food in the Last Year: Never true     Ran Out of Food in the Last Year: Never true   Transportation Needs: No Transportation Needs (4/5/2024)    PRAPARE - Transportation     Lack of Transportation (Medical): No     Lack of Transportation (Non-Medical): No   Physical Activity: Inactive (4/5/2024)    Exercise Vital Sign     Days of Exercise per Week: 0 days     Minutes of Exercise per Session: 0 min   Stress: No Stress Concern Present (4/5/2024)    [employer] [place] [employer] - Occupational Stress Questionnaire     Feeling of Stress : Not at all   Housing Stability: Low Risk  (4/5/2024)    Housing Stability Vital Sign     Unable to Pay for Housing in the Last Year: No     Number of Places Lived in the Last Year: 1     Unstable Housing in the Last Year: No       FAMILY HISTORY:  Family History   Problem Relation Name Age of Onset    Cancer Mother      Hypertension Mother      Heart disease Father      Hypertension Maternal Uncle      Hypertension Paternal Uncle      Hypertension Maternal Grandmother      Heart disease  Paternal Grandmother      Hypertension Paternal Grandmother         ALLERGIES AND MEDICATIONS: updated and reviewed.  Review of patient's allergies indicates:   Allergen Reactions    Percocet [oxycodone-acetaminophen]      Other reaction(s): Rash     Current Outpatient Medications   Medication Sig Dispense Refill    acetaminophen (TYLENOL) 500 MG tablet Take 1 tablet (500 mg total) by mouth every 4 (four) hours as needed for Pain. 20 tablet 0    albuterol (PROVENTIL/VENTOLIN HFA) 90 mcg/actuation inhaler Inhale 1-2 puffs into the lungs every 4 (four) hours as needed for Wheezing. 18 g 11    amitriptyline (ELAVIL) 50 MG tablet Take 25 mg by mouth every evening.      fexofenadine (ALLEGRA) 180 MG tablet Take 1 tablet (180 mg total) by mouth once daily. 30 tablet 11    hydroCHLOROthiazide (HYDRODIURIL) 25 MG tablet Take 1 tablet (25 mg total) by mouth once daily. 90 tablet 3    latanoprost 0.005 % ophthalmic solution       leuprolide acetate, 6 month, (ELIGARD) 45 mg injection Inject 45 mg into the skin every 6 (six) months.      losartan (COZAAR) 50 MG tablet Take 1 tablet by mouth once daily 90 tablet 3    mv-min/folic/K1/lycopen/lutein (CENTRUM MEN 50 PLUS MINIS ORAL) Take by mouth.      promethazine (PHENERGAN) 25 MG tablet Take 25 mg by mouth every 6 (six) hours as needed.      rosuvastatin (CRESTOR) 20 MG tablet Take 1 tablet (20 mg total) by mouth every evening. 90 tablet 3    diclofenac (CATAFLAM) 50 MG tablet Take 50 mg by mouth 2 (two) times daily as needed.      FLUoxetine 40 MG capsule Take 1 capsule (40 mg total) by mouth once daily. 90 capsule 3    fluticasone propionate (FLONASE) 50 mcg/actuation nasal spray 1 spray (50 mcg total) by Each Nostril route 2 (two) times daily as needed for Rhinitis. 16 g 11    glycopyrrolate-formoteroL (BEVESPI AEROSPHERE) 9-4.8 mcg HFAA Inhale 1 Inhalation into the lungs 2 (two) times daily. Controller 10.7 g 11    montelukast (SINGULAIR) 10 mg tablet Take 1 tablet (10 mg  total) by mouth every evening. 90 tablet 3    polyethylene glycol (GOLYTELY) 236-22.74-6.74 -5.86 gram suspension Take 4,000 mLs by mouth once.      rosuvastatin (CRESTOR) 20 MG tablet Take 1 tablet by mouth once daily.       No current facility-administered medications for this visit.         ROS  Review of Systems   Constitutional:  Negative for fever.   HENT:  Positive for congestion.    Respiratory:  Positive for cough and wheezing. Negative for shortness of breath.            Physical Exam  Vitals:    12/16/24 1626   BP: 116/70   Pulse: 68   Temp: 98 °F (36.7 °C)   TempSrc: Oral   SpO2: 99%    There is no height or weight on file to calculate BMI.          Physical Exam  Constitutional:       General: He is not in acute distress.  HENT:      Head: Normocephalic and atraumatic.      Right Ear: Tympanic membrane and ear canal normal.      Left Ear: Tympanic membrane and ear canal normal.      Nose: Congestion present.      Mouth/Throat:      Mouth: Mucous membranes are moist.      Pharynx: Oropharynx is clear.   Eyes:      Conjunctiva/sclera: Conjunctivae normal.   Cardiovascular:      Rate and Rhythm: Normal rate and regular rhythm.      Pulses: Normal pulses.      Heart sounds: Normal heart sounds.   Pulmonary:      Effort: Pulmonary effort is normal. No respiratory distress.      Breath sounds: Normal breath sounds.   Skin:     General: Skin is warm and dry.      Findings: No rash.   Neurological:      General: No focal deficit present.      Mental Status: He is alert and oriented to person, place, and time.   Psychiatric:         Mood and Affect: Mood normal.         Behavior: Behavior normal.         Thought Content: Thought content normal.

## 2024-12-19 ENCOUNTER — HOSPITAL ENCOUNTER (OUTPATIENT)
Dept: RESPIRATORY THERAPY | Facility: HOSPITAL | Age: 59
Discharge: HOME | End: 2024-12-19
Attending: FAMILY MEDICINE
Payer: MEDICARE

## 2024-12-19 VITALS — HEART RATE: 71 BPM | OXYGEN SATURATION: 94 % | RESPIRATION RATE: 20 BRPM

## 2024-12-19 DIAGNOSIS — Z77.090 ASBESTOS EXPOSURE: ICD-10-CM

## 2024-12-19 DIAGNOSIS — J43.9 PULMONARY EMPHYSEMA, UNSPECIFIED EMPHYSEMA TYPE: ICD-10-CM

## 2024-12-19 LAB
BRPFT: NORMAL
DLCO ADJ PRE: 16.08 ML/(MIN*MMHG)
DLCO SINGLE BREATH LLN: 19.44
DLCO SINGLE BREATH PRE REF: 61 %
DLCO SINGLE BREATH REF: 26.37
DLCOC SBVA LLN: 2.77
DLCOC SBVA PRE REF: 81.1 %
DLCOC SBVA REF: 4.05
DLCOC SINGLE BREATH LLN: 19.44
DLCOC SINGLE BREATH PRE REF: 61 %
DLCOC SINGLE BREATH REF: 26.37
DLCOVA LLN: 2.77
DLCOVA PRE REF: 81.1 %
DLCOVA PRE: 3.29 ML/(MIN*MMHG*L)
DLCOVA REF: 4.05
DLVAADJ PRE: 3.29 ML/(MIN*MMHG*L)
ERVN2 LLN: -16448.9
ERVN2 PRE REF: 1.8 %
ERVN2 PRE: 0.02 L
ERVN2 REF: 1.1
FEF 25 75 CHG: 8 %
FEF 25 75 LLN: 1.66
FEF 25 75 POST REF: 106.9 %
FEF 25 75 PRE REF: 99 %
FEF 25 75 REF: 3.38
FET100 CHG: 24.2 %
FEV1 CHG: 4.1 %
FEV1 FVC CHG: 2.3 %
FEV1 FVC LLN: 67
FEV1 FVC POST REF: 110.8 %
FEV1 FVC PRE REF: 108.3 %
FEV1 FVC REF: 78
FEV1 LLN: 1.96
FEV1 POST REF: 90 %
FEV1 PRE REF: 86.5 %
FEV1 REF: 2.73
FRCN2 LLN: 2.45
FRCN2 PRE REF: 36.1 %
FRCN2 REF: 3.44
FVC CHG: 1.8 %
FVC LLN: 2.57
FVC POST REF: 81.3 %
FVC PRE REF: 79.9 %
FVC REF: 3.48
IVC PRE: 2.99 L
IVC SINGLE BREATH LLN: 2.57
IVC SINGLE BREATH PRE REF: 85.9 %
IVC SINGLE BREATH REF: 3.48
PEF CHG: -29.7 %
PEF LLN: 5.21
PEF POST REF: 83.7 %
PEF PRE REF: 119.2 %
PEF REF: 7.63
POST FEF 25 75: 3.61 L/S
POST FET 100: 8.71 SEC
POST FEV1 FVC: 86.91 %
POST FEV1: 2.46 L
POST FVC: 2.83 L
POST PEF: 6.39 L/S
PRE DLCO: 16.08 ML/(MIN*MMHG)
PRE FEF 25 75: 3.34 L/S
PRE FET 100: 7.02 SEC
PRE FEV1 FVC: 84.95 %
PRE FEV1: 2.36 L
PRE FRC N2: 1.24 L
PRE FVC: 2.78 L
PRE PEF: 9.1 L/S
RVN2 LLN: 1.66
RVN2 PRE REF: 52.2 %
RVN2 PRE: 1.22 L
RVN2 REF: 2.34
RVN2TLCN2 LLN: 28.77
RVN2TLCN2 PRE REF: 74.6 %
RVN2TLCN2 PRE: 28.18 %
RVN2TLCN2 REF: 37.75
TLCN2 LLN: 5.35
TLCN2 PRE REF: 66.6 %
TLCN2 PRE: 4.33 L
TLCN2 REF: 6.5
VA PRE: 4.91 L
VA SINGLE BREATH LLN: 6.35
VA SINGLE BREATH PRE REF: 77.3 %
VA SINGLE BREATH REF: 6.35
VCMAXN2 LLN: 2.57
VCMAXN2 PRE REF: 89.5 %
VCMAXN2 PRE: 3.11 L
VCMAXN2 REF: 3.48

## 2024-12-19 PROCEDURE — 94200 LUNG FUNCTION TEST (MBC/MVV): CPT

## 2024-12-19 PROCEDURE — 94729 DIFFUSING CAPACITY: CPT

## 2024-12-19 PROCEDURE — 25000242 PHARM REV CODE 250 ALT 637 W/ HCPCS: Performed by: FAMILY MEDICINE

## 2024-12-19 RX ORDER — ALBUTEROL SULFATE 2.5 MG/.5ML
2.5 SOLUTION RESPIRATORY (INHALATION) ONCE
Status: COMPLETED | OUTPATIENT
Start: 2024-12-19 | End: 2024-12-19

## 2024-12-19 RX ADMIN — ALBUTEROL SULFATE 2.5 MG: 2.5 SOLUTION RESPIRATORY (INHALATION) at 10:12

## 2024-12-20 NOTE — PROGRESS NOTES
Please contact the patient and let him know that his breathing test was normal but it does not mean he does not have chronic bronchitis. This is still very much a possibility. Recommend to continue with his inhaler and follow up with Pulmonology to discuss further management.

## 2024-12-23 ENCOUNTER — TELEPHONE (OUTPATIENT)
Dept: FAMILY MEDICINE | Facility: CLINIC | Age: 59
End: 2024-12-23
Payer: MEDICARE

## 2024-12-23 NOTE — TELEPHONE ENCOUNTER
"Left voice message for patient to return call to clinic "breathing test was normal but it does not mean he does not have chronic bronchitis. This is still very much a possibility. Recommend to continue with his inhaler and follow up with Pulmonology to discuss further management. "  "

## 2024-12-23 NOTE — TELEPHONE ENCOUNTER
----- Message from Cee Solo DO sent at 12/19/2024  6:36 PM CST -----  Please contact the patient and let him know that his breathing test was normal but it does not mean he does not have chronic bronchitis. This is still very much a possibility. Recommend to continue with his inhaler and follow up with Pulmonology to discuss further management.

## 2025-01-28 ENCOUNTER — OFFICE VISIT (OUTPATIENT)
Dept: PULMONOLOGY | Facility: CLINIC | Age: 60
End: 2025-01-28
Payer: MEDICARE

## 2025-01-28 VITALS
OXYGEN SATURATION: 97 % | HEIGHT: 67 IN | SYSTOLIC BLOOD PRESSURE: 93 MMHG | HEART RATE: 65 BPM | BODY MASS INDEX: 39.22 KG/M2 | WEIGHT: 249.88 LBS | DIASTOLIC BLOOD PRESSURE: 67 MMHG

## 2025-01-28 DIAGNOSIS — Z77.090 ASBESTOS EXPOSURE: ICD-10-CM

## 2025-01-28 DIAGNOSIS — R05.9 COUGH, UNSPECIFIED TYPE: ICD-10-CM

## 2025-01-28 DIAGNOSIS — J43.9 PULMONARY EMPHYSEMA, UNSPECIFIED EMPHYSEMA TYPE: ICD-10-CM

## 2025-01-28 DIAGNOSIS — J45.901 ASTHMA WITH ACUTE EXACERBATION, UNSPECIFIED ASTHMA SEVERITY, UNSPECIFIED WHETHER PERSISTENT: ICD-10-CM

## 2025-01-28 DIAGNOSIS — F51.8 OTHER SLEEP DISORDERS NOT DUE TO A SUBSTANCE OR KNOWN PHYSIOLOGICAL CONDITION: ICD-10-CM

## 2025-01-28 DIAGNOSIS — R29.818 SUSPECTED SLEEP APNEA: ICD-10-CM

## 2025-01-28 DIAGNOSIS — R91.1 SOLITARY PULMONARY NODULE: Primary | ICD-10-CM

## 2025-01-28 PROCEDURE — 3008F BODY MASS INDEX DOCD: CPT | Mod: CPTII,S$GLB,, | Performed by: INTERNAL MEDICINE

## 2025-01-28 PROCEDURE — 3078F DIAST BP <80 MM HG: CPT | Mod: CPTII,S$GLB,, | Performed by: INTERNAL MEDICINE

## 2025-01-28 PROCEDURE — 99205 OFFICE O/P NEW HI 60 MIN: CPT | Mod: S$GLB,,, | Performed by: INTERNAL MEDICINE

## 2025-01-28 PROCEDURE — 1160F RVW MEDS BY RX/DR IN RCRD: CPT | Mod: CPTII,S$GLB,, | Performed by: INTERNAL MEDICINE

## 2025-01-28 PROCEDURE — 1159F MED LIST DOCD IN RCRD: CPT | Mod: CPTII,S$GLB,, | Performed by: INTERNAL MEDICINE

## 2025-01-28 PROCEDURE — 99999 PR PBB SHADOW E&M-EST. PATIENT-LVL IV: CPT | Mod: PBBFAC,,, | Performed by: INTERNAL MEDICINE

## 2025-01-28 PROCEDURE — 3074F SYST BP LT 130 MM HG: CPT | Mod: CPTII,S$GLB,, | Performed by: INTERNAL MEDICINE

## 2025-01-28 RX ORDER — ALBUTEROL SULFATE 90 UG/1
1-2 INHALANT RESPIRATORY (INHALATION) EVERY 4 HOURS PRN
Qty: 18 G | Refills: 11 | Status: SHIPPED | OUTPATIENT
Start: 2025-01-28

## 2025-01-28 RX ORDER — FLUTICASONE PROPIONATE AND SALMETEROL 250; 50 UG/1; UG/1
1 POWDER RESPIRATORY (INHALATION) 2 TIMES DAILY
Qty: 60 EACH | Refills: 11 | Status: SHIPPED | OUTPATIENT
Start: 2025-01-28 | End: 2026-01-28

## 2025-01-28 RX ORDER — AZELASTINE 1 MG/ML
1 SPRAY, METERED NASAL 2 TIMES DAILY
Qty: 30 ML | Refills: 3 | Status: SHIPPED | OUTPATIENT
Start: 2025-01-28 | End: 2026-01-28

## 2025-01-28 NOTE — PROGRESS NOTES
General Pulmonary Clinic  New Patient Visit    Chief Complaint: cough, emphysema, pulmonary nodules     HPI     Randolph Blue is a 59 y.o. male with a history of prostate cancer s/p PLND 9/2021/EBRT/ADT , obesity, GERD, HTN, HLD presenting with chief complaint of lung nodules, cough w/ emphysema.    # pulmonary nodules  # mild paraseptal emphysema  # cough    Cough that began 1 year ago  usually dry but intermittently productive of yellow-clear phlegm. It is associated with wheezing but notes wheezing preceded his cough by many years--since childhood w  hx of recurrent bronchitis in childhood requiring hospitalizations.  He also has significant allergic rhinitis w/ post nasal drip, rhinorrhea, throat clearing. He is also on an ARB since 2015. Denies GERD or dysphagia. No clear triggers to his wheezing or cough.      He was recently told by unspecified physician he may have asbestosis?  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  hobbies include none  Exposures: no tobacco/marijuana/vaping exposure, no current water damage/mold, no pets  Childhood history:allergies and recurent bronchitis - marcus 5-6 x per years      CT chest 4/202 personally interpreted  Mild emphysema, basilar atelectasis  Nodules  - RUL - 3 nodules < 4 mm  - lingula 4-5 mm nodules  - LLL 6 x 4 mm nodules, 1=2 mm nodule    Records reviewed with the following findings ---  5/2024       Objective   Past History     Past  Medical History:  Past Medical History:   Diagnosis Date    Arthritis     Bronchitis     Cervical arthritis     SILVIA (generalized anxiety disorder) 01/03/2014    GERD (gastroesophageal reflux disease)     Glaucoma     Hyperlipidemia, mixed 04/03/2014    Hypertension          Past Surgical History:  Past Surgical History:   Procedure Laterality Date    COLONOSCOPY N/A 4/4/2024    Procedure: COLONOSCOPY;  Surgeon: Tj Clarke MD;  Location: [facility];  Service: Endoscopy;  Laterality: N/A;  Referred by: Dr. Cee Solo  Prep: PEG  Route instructions sent: portal  Other concerns: Rectal bleeding, pt just d/c'd from [facility]    KNEE SURGERY      bilateral    MANDIBLE FRACTURE SURGERY      PROSTATE SURGERY      SHOULDER SURGERY      [place] [facility].          Social History:   Social History     Socioeconomic History    Marital status: Single   Tobacco Use    Smoking status: Never    Smokeless tobacco: Never   Substance and Sexual Activity    Alcohol use: Not Currently     Alcohol/week: 3.3 - 4.2 standard drinks of alcohol     Types: 4 - 5 Standard drinks or equivalent per week     Comment: No alcohol x 2 weeks    Drug use: No    Sexual activity: Not Currently     Social Drivers of Health     Financial Resource Strain: Low Risk  (4/5/2024)    Overall Financial Resource Strain (CARDIA)     Difficulty of Paying Living Expenses: Not hard at all   Food Insecurity: No Food Insecurity (4/5/2024)    Hunger Vital Sign     Worried About Running Out of Food in the Last Year: Never true     Ran Out of Food in the Last Year: Never true   Transportation Needs: No Transportation Needs (4/5/2024)    PRAPARE - Transportation     Lack of Transportation (Medical): No     Lack of Transportation (Non-Medical): No   Physical Activity: Inactive (4/5/2024)    Exercise Vital Sign     Days of Exercise per Week: 0 days     Minutes of Exercise per Session: 0 min   Stress: No Stress Concern Present (4/5/2024)    [employer]  [employer] - Occupational Stress Questionnaire     Feeling of Stress : Not at all   Housing Stability: Low Risk  (4/5/2024)    Housing Stability Vital Sign     Unable to Pay for Housing in the Last Year: No     Number of Places Lived in the Last Year: 1     Unstable Housing in the Last Year: No         Family Hx:  No family history of pulmonary fibrosis, pre-mature graying of hair, cirrhosis, or hematologic malignancies  No family history of emphysema or spontaneous PTX  no family history of lung cancer or lymphoma    Allergies and Pertinent Medications   Allergies and Medications Reviewed    Percocet [oxycodone-acetaminophen]  Current Outpatient Medications on File Prior to Visit   Medication Sig Dispense Refill    acetaminophen (TYLENOL) 500 MG tablet Take 1 tablet (500 mg total) by mouth every 4 (four) hours as needed for Pain. 20 tablet 0    albuterol (PROVENTIL/VENTOLIN HFA) 90 mcg/actuation inhaler Inhale 1-2 puffs into the lungs every 4 (four) hours as needed for Wheezing. 18 g 11    amitriptyline (ELAVIL) 50 MG tablet Take 25 mg by mouth every evening.      diclofenac (CATAFLAM) 50 MG tablet Take 50 mg by mouth 2 (two) times daily as needed.      fexofenadine (ALLEGRA) 180 MG tablet Take 1 tablet (180 mg total) by mouth once daily. 30 tablet 11    FLUoxetine 40 MG capsule Take 1 capsule (40 mg total) by mouth once daily. 90 capsule 3    fluticasone propionate (FLONASE) 50 mcg/actuation nasal spray 1 spray (50 mcg total) by Each Nostril route 2 (two) times daily as needed for Rhinitis. 16 g 11    glycopyrrolate-formoteroL (BEVESPI AEROSPHERE) 9-4.8 mcg HFAA Inhale 1 Inhalation into the lungs 2 (two) times daily. Controller 10.7 g 11    hydroCHLOROthiazide (HYDRODIURIL) 25 MG tablet Take 1 tablet (25 mg total) by mouth once daily. 90 tablet 3    latanoprost 0.005 % ophthalmic solution       leuprolide acetate, 6 month, (ELIGARD) 45 mg injection Inject 45 mg into the skin every 6 (six) months.       "losartan (COZAAR) 50 MG tablet Take 1 tablet by mouth once daily 90 tablet 3    montelukast (SINGULAIR) 10 mg tablet Take 1 tablet (10 mg total) by mouth every evening. 90 tablet 3    mv-min/folic/K1/lycopen/lutein (CENTRUM MEN 50 PLUS MINIS ORAL) Take by mouth.      polyethylene glycol (GOLYTELY) 236-22.74-6.74 -5.86 gram suspension Take 4,000 mLs by mouth once.      promethazine (PHENERGAN) 25 MG tablet Take 25 mg by mouth every 6 (six) hours as needed.      rosuvastatin (CRESTOR) 20 MG tablet Take 1 tablet (20 mg total) by mouth every evening. 90 tablet 3    rosuvastatin (CRESTOR) 20 MG tablet Take 1 tablet by mouth once daily.       No current facility-administered medications on file prior to visit.              Physical Exam   BP 93/67   Pulse 65   Ht 5' 7" (1.702 m)   Wt 113.3 kg (249 lb 14.3 oz)   SpO2 97%   BMI 39.14 kg/m²       Constitutional: No acute distress, Atraumatic   HEENT: moist mucus membranes, extraocular movements intact, mallampati IV  Cardiovascular: regular rate and rhythm, no murmurs, rubs or gallops  Pulmonary: normal respiratory rate and chest rise, no chest wall deformity, normal breath sounds with no wheezing or crackles  Abdominal: non-distended, bowel sounds present  Musculoskeletal: No lower extremity edema, no clubbing  Neurological: normal speech/neetu, moves all extremities against gravity  Skin: no finger cyanosis, no rashes on exposed body parts  Psych: Appropriate affect, normal mood       Diagnostic Studies      All diagnostic studies relevant to chief complaint reviewed personally    Labs:  Lab Results   Component Value Date    WBC 7.4 08/29/2024    WBC 7.51 05/28/2024    HGB 12.5 (L) 08/29/2024    HGB 14.9 05/28/2024     05/28/2024    MCV 81.0 08/29/2024    MCV 85 05/28/2024     Lab Results   Component Value Date     05/28/2024    K 3.7 05/28/2024    CO2 26 05/28/2024    BUN 15 05/28/2024    CREATININE 1.0 05/28/2024    MG 1.8 03/31/2024     Lab Results " "  Component Value Date    AST 17 05/28/2024    ALT 15 05/28/2024    ALBUMIN 3.8 05/28/2024    PROT 8.8 (H) 05/28/2024    BILITOT 1.0 05/28/2024         PFTs:  Pulmonary Functions Testing Results:  No results found for: "FEV1", "FVC", "LHZ7POF", "TLC", "DLCO"         TTE:  No results found for this or any previous visit.            Assessment and Plan   Randolph Blue is a 59 y.o. male  with a history of prostate cancer s/p PLND 9/2021/EBRT/ADT , obesity, GERD, HTN, HLD presenting with chief complaint of lung nodules, cough w/ emphysema.    Problem List:  # cough w/ suspected airway disease, emphysema - chronic, stable - given childhood history asthma is a possibility and may explain normal PFTs outside of exposure. Minimal emphysema may be related to environmental exposures -- secondhand smoke exposure in bars, possible work place exposure related to fumes/chemicals, pollution. I explained to the patient that nothing on his CT chest is pathognomic for asbestos exposure -- he does not have pleural plaques, subpleural lines, or pulmonary fibrosis. As above emphysema can occur from multiple causes and the link between obstructive lung disease/asbestos is often confounded.      Plan to switch bevespi to ICS/LABA -- start advair 250 1 puff bid w/ albuterol PRN. Reviewed importance of inhaler technique and instructional videos provided    Suspect cough may also be related to allergic rhinosinusitis.  # allergic rhinosinusitis - chronic, stable - start azelastine nasal spray w/ flonase  # suspected sleep apnea - obese BMI 39-40 + HTN + mallampati IV + frequent night awakenings + daytime sleepiness concerning for sleep apnea. Stressed risks of untreated sleep apnea including heart failure, arrhythmia, stroke, pulmonary HTN worsening airway disease. Will plan for sleep study.  # pulmonary nodules - chronic - plan for repeat CT chest in April 2025    Explained to the patient I work Monday-Thursdays, so any results or messages " received after working hours Thursday through Monday AM would not be addressed until I was back in the office. If he/she experienced any acute symptoms he/she should go the emergency room for immediate help.    Differential, diagnoses, diagnostic work up, and possible treatments were discussed with the patient. Questions were answered.    Return in April 1 week post CT chest to review    Magaly Riley MD  Pulmonary-Critical Care Medicine              For this visit, the following time was spent  preparing to see the patient (e.g., review of tests) 15 minutes  obtaining and/or reviewing separately obtained history 0 minutes  Performing a medically necessary appropriate examination and/or evaluation 15 minutes  Counseling and educating the patient/family/caregiver 12 minutes  Ordering medications, tests, or procedures 1 minutes  Referring and communicating with other health care professionals (when not reported separately) 0minutes  Documenting clinical information in the electronic or other health record 12minutes  Care coordination (not reported separately) 0minutes    Total time = 60 minutes

## 2025-01-28 NOTE — PATIENT INSTRUCTIONS
Please start azelastine 1 spray in each nostril twice a day    I am prescribing you advair to help with your bronchitis. Please take 1 puff in the morning and 1 puff in the evening every day.    Please ensure your gargle after each use. There are little to no immediate side effects with this medication as there is minimal absorption to the blood. A low grade yeast infection in the mouth called thrush can develop if you do not rinse/gargle your mouth after use      I am also prescribing an albuterol inhaler to take AS NEEDED for shortness of breath above your baseline. This medication can increase your heart rate. If using for more than 2 times per day, please let me know.    Please watch a video on appropriate use of your inhaler as it is crucial that is delivered to your airways (and not the back of your mouth to be effective.)    Videos:    How to use a meter-dosed inhaler: https://www.[employer]Spring[employer].com/watch?v=9ipqxF-4p5g    How to use a spacer (Aerochamber) with meter-dosed inhaler: https://www.[employer].com/watch?v=ltB-ZOjlkgY    How to use a diskus inhaler: https://youtu.be/LL0ani4JsbY      For General information on Inhalers:    https://www.national[employer].org/conditions/medications/asthma-medications/devices

## 2025-02-05 ENCOUNTER — TELEPHONE (OUTPATIENT)
Dept: PULMONOLOGY | Facility: CLINIC | Age: 60
End: 2025-02-05
Payer: MEDICARE

## 2025-02-08 DIAGNOSIS — I10 ESSENTIAL HYPERTENSION: ICD-10-CM

## 2025-02-08 NOTE — TELEPHONE ENCOUNTER
No care due was identified.  [facility] Embedded Care Due Messages. Reference number: [id number].   2/08/2025 4:49:44 AM CST

## 2025-02-09 RX ORDER — HYDROCHLOROTHIAZIDE 25 MG/1
25 TABLET ORAL
Qty: 90 TABLET | Refills: 1 | Status: SHIPPED | OUTPATIENT
Start: 2025-02-09

## 2025-02-09 NOTE — TELEPHONE ENCOUNTER
Refill Decision Note   [facility]  is requesting a refill authorization.  Brief Assessment and Rationale for Refill:  Approve     Medication Therapy Plan:         Comments:     Note composed:6:08 AM 02/09/2025

## 2025-02-13 ENCOUNTER — HOSPITAL ENCOUNTER (OUTPATIENT)
Dept: SLEEP MEDICINE | Facility: HOSPITAL | Age: 60
Discharge: HOME | End: 2025-02-13
Attending: INTERNAL MEDICINE
Payer: MEDICARE

## 2025-02-13 DIAGNOSIS — F51.8 OTHER SLEEP DISORDERS NOT DUE TO A SUBSTANCE OR KNOWN PHYSIOLOGICAL CONDITION: ICD-10-CM

## 2025-02-13 DIAGNOSIS — R29.818 SUSPECTED SLEEP APNEA: ICD-10-CM

## 2025-02-13 PROCEDURE — 95810 POLYSOM 6/> YRS 4/> PARAM: CPT

## 2025-02-14 PROBLEM — R29.818 SUSPECTED SLEEP APNEA: Status: ACTIVE | Noted: 2025-02-14

## 2025-02-14 NOTE — PROGRESS NOTES
End of the night summary      Type of study performed on ([facility]-)  PSG  ?  Patient education/cpap information prior to study/setup     Pt was informed, of emergency cord in bathroom and given spectra link phone#     EKG Appears to be- NSR     Low spo2 -  87%     Any difficulties recording:  Tir to secure leg lead and secure chin lead     Pt reaction to CPAP:  pt reports he is not intrested in using cpap     Tech summary Comment    pt did not meet criteria for split on cpap. some soft to none snoring observed. pt has some events observed on his sides and in REM sleep. pt reports that he is unable to try to sleep supine because he normally sleeps prone. pt slept okay no reports of discomfort. pt reports he has to wake up early for work

## 2025-02-24 ENCOUNTER — PATIENT MESSAGE (OUTPATIENT)
Dept: PULMONOLOGY | Facility: CLINIC | Age: 60
End: 2025-02-24
Payer: MEDICARE

## 2025-04-07 ENCOUNTER — TELEPHONE (OUTPATIENT)
Dept: FAMILY MEDICINE | Facility: CLINIC | Age: 60
End: 2025-04-07
Payer: MEDICARE

## 2025-04-07 NOTE — TELEPHONE ENCOUNTER
----- Message from Med Assistant Grajeda sent at 4/7/2025  8:12 AM CDT -----  Name of Who is Calling:KESHIA BUTLER [[id number]]  What is the request in detail: Pt is requesting a call back to reschedule appt, no schedule coming up. Please assist.  Can the clinic reply by [facility]: No  What Number to Call Back [facility]: [phone]

## 2025-04-15 ENCOUNTER — HOSPITAL ENCOUNTER (OUTPATIENT)
Dept: RADIOLOGY | Facility: HOSPITAL | Age: 60
Discharge: HOME | End: 2025-04-15
Attending: FAMILY MEDICINE
Payer: MEDICARE

## 2025-04-15 DIAGNOSIS — R91.1 PULMONARY NODULE, RIGHT: ICD-10-CM

## 2025-04-15 PROCEDURE — 71250 CT THORAX DX C-: CPT | Mod: 26,HCNC,, | Performed by: RADIOLOGY

## 2025-04-15 PROCEDURE — 71250 CT THORAX DX C-: CPT | Mod: TC,HCNC

## 2025-04-20 ENCOUNTER — RESULTS FOLLOW-UP (OUTPATIENT)
Dept: FAMILY MEDICINE | Facility: CLINIC | Age: 60
End: 2025-04-20

## 2025-04-22 ENCOUNTER — OFFICE VISIT (OUTPATIENT)
Dept: PULMONOLOGY | Facility: CLINIC | Age: 60
End: 2025-04-22
Payer: MEDICARE

## 2025-04-22 ENCOUNTER — TELEPHONE (OUTPATIENT)
Dept: ADMINISTRATIVE | Facility: CLINIC | Age: 60
End: 2025-04-22
Payer: MEDICARE

## 2025-04-22 VITALS
BODY MASS INDEX: 40 KG/M2 | DIASTOLIC BLOOD PRESSURE: 78 MMHG | WEIGHT: 254.88 LBS | HEIGHT: 67 IN | HEART RATE: 84 BPM | SYSTOLIC BLOOD PRESSURE: 125 MMHG | OXYGEN SATURATION: 92 %

## 2025-04-22 DIAGNOSIS — J45.30 MILD PERSISTENT ASTHMA, UNSPECIFIED WHETHER COMPLICATED: ICD-10-CM

## 2025-04-22 DIAGNOSIS — E66.01 CLASS 3 SEVERE OBESITY DUE TO EXCESS CALORIES WITH SERIOUS COMORBIDITY AND BODY MASS INDEX (BMI) OF 40.0 TO 44.9 IN ADULT: ICD-10-CM

## 2025-04-22 DIAGNOSIS — G47.30 OBESITY WITH SLEEP APNEA: ICD-10-CM

## 2025-04-22 DIAGNOSIS — E66.9 OBESITY WITH SLEEP APNEA: ICD-10-CM

## 2025-04-22 DIAGNOSIS — J43.9 PULMONARY EMPHYSEMA, UNSPECIFIED EMPHYSEMA TYPE: ICD-10-CM

## 2025-04-22 DIAGNOSIS — E66.813 CLASS 3 SEVERE OBESITY DUE TO EXCESS CALORIES WITH SERIOUS COMORBIDITY AND BODY MASS INDEX (BMI) OF 40.0 TO 44.9 IN ADULT: ICD-10-CM

## 2025-04-22 DIAGNOSIS — R91.8 MULTIPLE PULMONARY NODULES: ICD-10-CM

## 2025-04-22 DIAGNOSIS — G47.33 OBSTRUCTIVE SLEEP APNEA SYNDROME: Primary | ICD-10-CM

## 2025-04-22 PROCEDURE — 99214 OFFICE O/P EST MOD 30 MIN: CPT | Mod: HCNC,S$GLB,, | Performed by: INTERNAL MEDICINE

## 2025-04-22 PROCEDURE — 3078F DIAST BP <80 MM HG: CPT | Mod: HCNC,CPTII,S$GLB, | Performed by: INTERNAL MEDICINE

## 2025-04-22 PROCEDURE — 1159F MED LIST DOCD IN RCRD: CPT | Mod: HCNC,CPTII,S$GLB, | Performed by: INTERNAL MEDICINE

## 2025-04-22 PROCEDURE — 3008F BODY MASS INDEX DOCD: CPT | Mod: HCNC,CPTII,S$GLB, | Performed by: INTERNAL MEDICINE

## 2025-04-22 PROCEDURE — 1160F RVW MEDS BY RX/DR IN RCRD: CPT | Mod: HCNC,CPTII,S$GLB, | Performed by: INTERNAL MEDICINE

## 2025-04-22 PROCEDURE — 99999 PR PBB SHADOW E&M-EST. PATIENT-LVL IV: CPT | Mod: PBBFAC,HCNC,, | Performed by: INTERNAL MEDICINE

## 2025-04-22 PROCEDURE — 3074F SYST BP LT 130 MM HG: CPT | Mod: HCNC,CPTII,S$GLB, | Performed by: INTERNAL MEDICINE

## 2025-04-22 PROCEDURE — 4010F ACE/ARB THERAPY RXD/TAKEN: CPT | Mod: HCNC,CPTII,S$GLB, | Performed by: INTERNAL MEDICINE

## 2025-04-22 NOTE — PROGRESS NOTES
General Pulmonary Clinic  Follow Up Patient Visit    Chief Complaint: cough, emphysema, pulmonary nodules     HPI     Randolph Blue is a 59 y.o. male with a history of prostate cancer s/p PLND 9/2021/EBRT/ADT , obesity, GERD, HTN, HLD presenting with chief complaint of lung nodules, cough w/ emphysema.    Interval hx:  Sleep study w/ mild sleep apnea  He has improved on advair 250 mg bid -- he has not required albuterol  Stable lung noduels on CT  chest never smoker    Presenting HPI  # pulmonary nodules  # mild paraseptal emphysema  # cough    Cough that began 1 year ago  usually dry but intermittently productive of yellow-clear phlegm. It is associated with wheezing but notes wheezing preceded his cough by many years--since childhood w  hx of recurrent bronchitis in childhood requiring hospitalizations.  He also has significant allergic rhinitis w/ post nasal drip, rhinorrhea, throat clearing. He is also on an ARB since 2015. Denies GERD or dysphagia. No clear triggers to his wheezing or cough.      He was recently told by unspecified physician he may have asbestosis?  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  hobbies include none  Exposures: no tobacco/marijuana/vaping exposure, no current water damage/mold, no pets  Childhood history:allergies and recurent bronchitis - marcus 5-6 x per years      CT chest 4/202 personally interpreted  Mild emphysema, basilar atelectasis  Nodules  - RUL -  3 nodules < 4 mm  - lingula 4-5 mm nodules  - LLL 6 x 4 mm nodules, 1=2 mm nodule    Records reviewed with the following findings ---  5/2024       Objective   Past History     Past Medical History:  Past Medical History:   Diagnosis Date    Arthritis     Bronchitis     Cervical arthritis     SILVIA (generalized anxiety disorder) 01/03/2014    GERD (gastroesophageal reflux disease)     Glaucoma     Hyperlipidemia, mixed 04/03/2014    Hypertension          Past Surgical History:  Past Surgical History:   Procedure Laterality Date    COLONOSCOPY N/A 4/4/2024    Procedure: COLONOSCOPY;  Surgeon: Tj Clarke MD;  Location: [facility];  Service: Endoscopy;  Laterality: N/A;  Referred by: Dr. Cee Solo  Prep: PEG  Route instructions sent: portal  Other concerns: Rectal bleeding, pt just d/c'd from hospitals    KNEE SURGERY      bilateral    MANDIBLE FRACTURE SURGERY      PROSTATE SURGERY      SHOULDER SURGERY      [place] [facility].          Social History:   Social History     Socioeconomic History    Marital status: Single   Tobacco Use    Smoking status: Never    Smokeless tobacco: Never   Substance and Sexual Activity    Alcohol use: Not Currently     Alcohol/week: 3.3 - 4.2 standard drinks of alcohol     Types: 4 - 5 Standard drinks or equivalent per week     Comment: No alcohol x 2 weeks    Drug use: No    Sexual activity: Not Currently     Social Drivers of Health     Financial Resource Strain: Low Risk  (4/21/2025)    Overall Financial Resource Strain (CARDIA)     Difficulty of Paying Living Expenses: Not hard at all   Food Insecurity: No Food Insecurity (4/21/2025)    Hunger Vital Sign     Worried About Running Out of Food in the Last Year: Never true     Ran Out of Food in the Last Year: Never true   Transportation Needs: No Transportation Needs (4/21/2025)    PRAPARE - Transportation     Lack of Transportation (Medical): No     Lack of Transportation (Non-Medical): No   Physical Activity:  Unknown (4/21/2025)    Exercise Vital Sign     Days of Exercise per Week: 2 days   Stress: No Stress Concern Present (4/21/2025)    [employer] - Occupational Stress Questionnaire     Feeling of Stress : Not at all   Housing Stability: Low Risk  (4/21/2025)    Housing Stability Vital Sign     Unable to Pay for Housing in the Last Year: No     Number of Times Moved in the Last Year: 0     Homeless in the Last Year: No         Family Hx:  No family history of pulmonary fibrosis, pre-mature graying of hair, cirrhosis, or hematologic malignancies  No family history of emphysema or spontaneous PTX  no family history of lung cancer or lymphoma    Allergies and Pertinent Medications   Allergies and Medications Reviewed    Percocet [oxycodone-acetaminophen]  Current Outpatient Medications on File Prior to Visit   Medication Sig Dispense Refill    acetaminophen (TYLENOL) 500 MG tablet Take 1 tablet (500 mg total) by mouth every 4 (four) hours as needed for Pain. 20 tablet 0    albuterol (PROVENTIL/VENTOLIN HFA) 90 mcg/actuation inhaler Inhale 1-2 puffs into the lungs every 4 (four) hours as needed for Wheezing. 18 g 11    amitriptyline (ELAVIL) 50 MG tablet Take 25 mg by mouth every evening.      azelastine (ASTELIN) 137 mcg (0.1 %) nasal spray 1 spray (137 mcg total) by Nasal route 2 (two) times daily. 30 mL 3    diclofenac (CATAFLAM) 50 MG tablet Take 50 mg by mouth 2 (two) times daily as needed.      fexofenadine (ALLEGRA) 180 MG tablet Take 1 tablet (180 mg total) by mouth once daily. 30 tablet 11    FLUoxetine 40 MG capsule Take 1 capsule (40 mg total) by mouth once daily. 90 capsule 3    fluticasone propionate (FLONASE) 50 mcg/actuation nasal spray 1 spray (50 mcg total) by Each Nostril route 2 (two) times daily as needed for Rhinitis. 16 g 11    fluticasone-salmeterol diskus inhaler 250-50 mcg Inhale 1 puff into the lungs 2 (two) times daily. Controller 60 each 11    hydroCHLOROthiazide  "(HYDRODIURIL) 25 MG tablet Take 1 tablet by mouth once daily 90 tablet 1    latanoprost 0.005 % ophthalmic solution       leuprolide acetate, 6 month, (ELIGARD) 45 mg injection Inject 45 mg into the skin every 6 (six) months.      losartan (COZAAR) 50 MG tablet Take 1 tablet by mouth once daily 90 tablet 3    montelukast (SINGULAIR) 10 mg tablet Take 1 tablet (10 mg total) by mouth every evening. 90 tablet 3    mv-min/folic/K1/lycopen/lutein (CENTRUM MEN 50 PLUS MINIS ORAL) Take by mouth.      polyethylene glycol (GOLYTELY) 236-22.74-6.74 -5.86 gram suspension Take 4,000 mLs by mouth once.      promethazine (PHENERGAN) 25 MG tablet Take 25 mg by mouth every 6 (six) hours as needed.      rosuvastatin (CRESTOR) 20 MG tablet Take 1 tablet (20 mg total) by mouth every evening. 90 tablet 3    rosuvastatin (CRESTOR) 20 MG tablet Take 1 tablet by mouth once daily.       No current facility-administered medications on file prior to visit.              Physical Exam   /78   Pulse 84   Ht 5' 7" (1.702 m)   Wt 115.6 kg (254 lb 13.6 oz)   SpO2 (!) 92%   BMI 39.92 kg/m²       Constitutional: No acute distress, Atraumatic   HEENT: moist mucus membranes, extraocular movements intact, mallampati IV  Cardiovascular: regular rate and rhythm, no murmurs, rubs or gallops  Pulmonary: normal respiratory rate and chest rise, no chest wall deformity, normal breath sounds with no wheezing or crackles  Abdominal: non-distended, bowel sounds present  Musculoskeletal: No lower extremity edema, no clubbing  Neurological: normal speech/neetu, moves all extremities against gravity  Skin: no finger cyanosis, no rashes on exposed body parts  Psych: Appropriate affect, normal mood       Diagnostic Studies      All diagnostic studies relevant to chief complaint reviewed personally    Labs:  Lab Results   Component Value Date    WBC 7.4 08/29/2024    WBC 7.51 05/28/2024    HGB 12.5 (L) 08/29/2024    HGB 14.9 05/28/2024     " "05/28/2024    MCV 81.0 08/29/2024    MCV 85 05/28/2024     Lab Results   Component Value Date     01/30/2025     05/28/2024    K 3.9 01/30/2025    K 3.7 05/28/2024    CO2 27 01/30/2025    CO2 26 05/28/2024    BUN 20.0 01/30/2025    BUN 15 05/28/2024    CREATININE 0.81 01/30/2025    CREATININE 1.0 05/28/2024    MG 1.8 03/31/2024     Lab Results   Component Value Date    AST 17 01/30/2025    AST 17 05/28/2024    ALT 14 01/30/2025    ALT 15 05/28/2024    ALBUMIN 3.8 01/30/2025    ALBUMIN 3.8 05/28/2024    PROT 8.8 (H) 05/28/2024    BILITOT 0.7 01/30/2025    BILITOT 1.0 05/28/2024         PFTs:  Pulmonary Functions Testing Results:  No results found for: "FEV1", "FVC", "GPF7ZPH", "TLC", "DLCO"             TTE:  No results found for this or any previous visit.            Assessment and Plan   Randolph Blue is a 59 y.o. male  with a history of prostate cancer s/p PLND 9/2021/EBRT/ADT , obesity, GERD, HTN, HLD presenting with chief complaint of lung nodules, cough w/ emphysema.    Problem List:  # cough w/ suspected airway disease, emphysema - chronic, stable - given childhood history asthma is a possibility and may explain normal PFTs outside of exposure. Minimal emphysema may be related to environmental exposures -- secondhand smoke exposure in bars, possible work place exposure related to fumes/chemicals, pollution. I explained to the patient that nothing on his CT chest is pathognomic for asbestos exposure -- he does not have pleural plaques, subpleural lines, or pulmonary fibrosis. As above emphysema can occur from multiple causes and the link between obstructive lung disease/asbestos is often confounded.      Improved on advair 250 1 puff bid w/ albuterol PRN -- consider step down at next visit  # mild sleep apnea -chronic, stable - AHI 9.4 ordered auto-CPAP, reviewed importance of adherence  # obesity - BMI 39 - chronic, stable - extended discussion regarding weight loss and importance for asthma " control, sleep apnea improvement and overall health. He is pre-contemplative on losing weight, stating he likes his late night snacks.  # pulmonary nodules - chronic - stable in size over 1 year, never smoker so no additional folow up needed    Explained to the patient I work Monday-Thursdays, so any results or messages received after working hours Thursday through Monday AM would not be addressed until I was back in the office. If he/she experienced any acute symptoms he/she should go the emergency room for immediate help.    Differential, diagnoses, diagnostic work up, and possible treatments were discussed with the patient. Questions were answered.    Return in June    Magaly Riley MD  Pulmonary-Critical Care Medicine              For this visit, the following time was spent  preparing to see the patient (e.g., review of tests) 5 minutes  obtaining and/or reviewing separately obtained history 0 minutes  Performing a medically necessary appropriate examination and/or evaluation 10 minutes  Counseling and educating the patient/family/caregiver 10 minutes  Ordering medications, tests, or procedures 1 minutes  Referring and communicating with other health care professionals (when not reported separately) 0minutes  Documenting clinical information in the electronic or other health record5 minutes  Care coordination (not reported separately) 0minutes    Total time = 31 minutes

## 2025-04-22 NOTE — TELEPHONE ENCOUNTER
----- Message from Nurse Palmer sent at 4/7/2025  4:20 PM CDT -----  Contact: pt    ----- Message -----  From: Kendrick Magallanes  Sent: 4/5/2025  11:40 AM CDT  To: Reza Sauceda Staff    Type: Requesting to speak with Loreta Called: PTRegarding: would like to reschedule 4/8 appointment ENHANCED ANNUAL WELLNESS VISIT [[id number]]Would the patient rather a call back or a response via [facility]? Call [facility] Call Back Number: Additional Information:  [phone]

## 2025-04-28 DIAGNOSIS — Z00.00 ENCOUNTER FOR MEDICARE ANNUAL WELLNESS EXAM: ICD-10-CM

## 2025-05-03 ENCOUNTER — NURSE TRIAGE (OUTPATIENT)
Dept: ADMINISTRATIVE | Facility: CLINIC | Age: 60
End: 2025-05-03
Payer: MEDICARE

## 2025-05-03 NOTE — TELEPHONE ENCOUNTER
Patient is calling for a refill of his mupirocin. Advised this was discontinued on 1/26/24. States that was in error. He has a sore on his foot. Triage process explained. States this RX is now a new RX and would need to be addressed on Monday with his provider. Triage done for sore on foot. States he scratched it. Dispo see provider in 3 days. Declined appointment, just wants message sent for refill. Call back or  advised for worsening S/S     Reason for Disposition   Minor cut or scratch   Prescription request for new medicine (not a refill)    Additional Information   Negative: Animal bite   Negative: Human bite   Negative: [1] Bleeding AND [2] won't stop after 10 minutes of direct pressure (using correct technique)   Negative: Skin is split open or gaping (or length > 1/2 inch or 12 mm on the skin, 1/4 inch or 6 mm on the face)   Negative: [1] Deep cut AND [2] can see bone or tendons   Negative: Skin loss goes very deep (e.g., can see bones or tendons)   Negative: Skin loss involves more than 10% of body surface area (BSA)   Negative: [1] Dirt in the wound AND [2] not removed with 15 minutes of scrubbing   Negative: High pressure injection injury (e.g., from grease gun or paint gun, usually work-related)   Negative: Wound causes numbness (i.e., loss of sensation)   Negative: Wound causes weakness (i.e., decreased ability to move hand, finger, toe)   Negative: Sounds like a serious injury to the triager   Negative: [1] SEVERE pain AND [2] not improved 2 hours after pain medicine/ice packs   Negative: [1] Looks infected (spreading redness, red streak, pus) AND [2] fever   Negative: [1] Looks infected (e.g., spreading redness, pus) AND [2] large red area (> 2 inches or 5 cm)   Negative: [1] Raised bruise AND [2] size > 2 inches (5 cm) AND [3] expanding   Negative: [1] Looks infected (e.g., spreading redness, pus) AND [2] no fever   Negative: No prior tetanus shots (or is not fully vaccinated)   Negative: [1] HIV  positive or severe immunodeficiency (severely weak immune system) AND [2] DIRTY cut or scrape   Negative: Suspicious history for the injury   Negative: [1] Last tetanus shot > 5 years ago AND [2] DIRTY cut or scrape   Negative: [1] Last tetanus shot > 10 years ago AND [2] CLEAN cut or scrape (e.g., object AND skin were clean)   Negative: [1] After 14 days AND [2] wound isn't healed   Negative: [1] Minor skin injury (e,g,. minor cut, scratch, scrape) on foot AND [2] diabetes (diabetes mellitus)   Negative: [1] Major bleeding (e.g., actively dripping or spurting) AND [2] can't be stopped   Negative: Amputation   Negative: Shock suspected (e.g., cold/pale/clammy skin, too weak to stand, low BP, rapid pulse)   Negative: [1] Knife wound (or other possibly deep cut) AND [2] to chest, abdomen, back, neck, or head   Negative: Sounds like a life-threatening emergency to the triager   Negative: [1] Minor cut, scratch, scrape, or scab AND [2] from self-injury (e.g., cutting, picking; self-harm) AND [3] stable (i.e., not suicidal, not out of control)    Protocols used: Skin Injury-A-, Medication Refill and Renewal Call-A-

## 2025-05-05 NOTE — TELEPHONE ENCOUNTER
Left message on patient's voicemail to return call to clinic regarding scheduling an appointment for sore on  foot.

## 2025-05-13 ENCOUNTER — TELEPHONE (OUTPATIENT)
Dept: FAMILY MEDICINE | Facility: CLINIC | Age: 60
End: 2025-05-13
Payer: MEDICARE

## 2025-05-13 NOTE — TELEPHONE ENCOUNTER
----- Message from Nurse Pike sent at 5/1/2025  4:06 PM CDT -----  Please assist patient with scheduling thank you.  ----- Message -----  From: Oxana Carr  Sent: 5/1/2025   9:34 AM CDT  To: Edil Mike Staff    Type: Patient Call Back Who called: Self What is the request in detail: pt is calling to reschedule EAWV that was cancelled for 4/24/2025 Can the clinic reply by [facility]? either Would the patient rather a call back or a response via [facility]? Call back  Best call back number: [phone] Additional Information:

## 2025-05-21 ENCOUNTER — PATIENT MESSAGE (OUTPATIENT)
Dept: FAMILY MEDICINE | Facility: CLINIC | Age: 60
End: 2025-05-21
Payer: MEDICARE

## 2025-05-22 ENCOUNTER — OFFICE VISIT (OUTPATIENT)
Dept: FAMILY MEDICINE | Facility: CLINIC | Age: 60
End: 2025-05-22
Payer: MEDICARE

## 2025-05-22 VITALS
DIASTOLIC BLOOD PRESSURE: 80 MMHG | TEMPERATURE: 98 F | BODY MASS INDEX: 40.17 KG/M2 | OXYGEN SATURATION: 96 % | HEART RATE: 98 BPM | SYSTOLIC BLOOD PRESSURE: 128 MMHG | WEIGHT: 255.94 LBS | HEIGHT: 67 IN

## 2025-05-22 DIAGNOSIS — F41.9 ANXIETY: ICD-10-CM

## 2025-05-22 DIAGNOSIS — Z85.46 HISTORY OF PROSTATE CANCER: ICD-10-CM

## 2025-05-22 DIAGNOSIS — Z00.00 ENCOUNTER FOR MEDICARE ANNUAL WELLNESS EXAM: Primary | ICD-10-CM

## 2025-05-22 DIAGNOSIS — Z71.89 ADVANCE DIRECTIVE DISCUSSED WITH PATIENT: ICD-10-CM

## 2025-05-22 DIAGNOSIS — E78.2 HYPERLIPIDEMIA, MIXED: ICD-10-CM

## 2025-05-22 PROCEDURE — 99999 PR PBB SHADOW E&M-EST. PATIENT-LVL V: CPT | Mod: PBBFAC,HCNC,,

## 2025-05-22 NOTE — PROGRESS NOTES
"Randolph Blue presented for a follow-up Medicare AWV today. The following components were reviewed and updated:    Medical history  Family History  Social history  Allergies and Current Medications  Health Risk Assessment  Health Maintenance  Care Team    **See Completed Assessments for Annual Wellness visit with in the encounter summary    The following assessments were completed:  Depression Screening  Cognitive function Screening  Timed Get Up Test  Whisper Test      Opioid documentation:    Patient does not have a current opioid prescription.          Vitals:    05/22/25 0852   BP: 128/80   Pulse: 98   Temp: 98.2 °F (36.8 °C)   TempSrc: Other (see comments)   SpO2: 96%   Weight: 116.1 kg (255 lb 15.3 oz)   Height: 5' 7" (1.702 m)     Body mass index is 40.09 kg/m².        Physical Exam   Vital signs reviewed.   Body mass index is 40.09 kg/m².   General:  Well-developed, well-nourished. NAD.   Skin:  Warm, dry.   Eyes:  Conjunctivae w/o exudates or hemorrhage.  Non-icteric sclerae.    Heart:  Normal S1 & S2.  No extra heart sounds.    Lungs:  CTAB without rales, rhonchi or wheezing.  Breathing comfortably on RA.  Extremities:  Radial pulses 2+ and symmetric  Neuro:  A&O4.  No obvious focal deficits. Negative Alvarez's sign.    Psychiatric:  Appropriate affect.    Clock:       Assessment & Plan     Encounter for Medicare annual wellness exam  Pt was seen today for an Annual Wellness visit.  Healthcare maintenance and screening recommendations were discussed and updated as indicated.  Patient asked to return in one year for routine AWV.  -- last saw PCP 12/2024, has a appointment scheduled for 06/2025  -     Referral to Enhanced Annual Wellness Visit (eAWV) W+1    Advance directive discussed with patient  I offered to discuss advanced care planning, including how to pick a person who would make decisions for you if you were unable to make them for yourself, called a health care power of , and what kind of " decisions you might make such as use of life sustaining treatments such as ventilators and tube feeding when faced with a life limiting illness recorded on a living will that they will need to know. (How you want to be cared for as you near the end of your natural life)  -- Patient is interested in learning more about how to make advanced directives.  I provided them paperwork and discussed the rationale and logistics of its completion and return at next OV.        Anxiety  Has fluoxetine, but states that he only takes this occasionally at night PRN as it makes him drowsy  -- Discussed that SSRIs take weeks of consistent use to produce benefit, but he states that this intermittent use works to help him sleep.   -- Denies daily anxiety  -- Will continue to monitor      History of prostate cancer  Still follows with Heme-Onc, appointment scheduled for 7/2025.  Finished with leuprolide, testosterone low at 11 eight months ago.  Doing well, no new c/o, will continue to monitor      Hyperlipidemia, mixed  Well-controlled on Crestor 20.  Will follow-up with PCP as scheduled after repeat labs        Provided Randolph with a 5-10 year written screening schedule and personal prevention plan. Recommendations were developed using the USPSTF age appropriate recommendations. Education, counseling, and referrals were provided as needed.  After Visit Summary printed and given to patient which includes a list of additional screenings\tests needed.    Sohail Cobb PA-C

## 2025-05-22 NOTE — ASSESSMENT & PLAN NOTE
Has fluoxetine, but states that he only takes this occasionally at night PRN as it makes him drowsy  -- Discussed that SSRIs take weeks of consistent use to produce benefit, but he states that this intermittent use works to help him sleep.   -- Denies daily anxiety  -- Will continue to monitor

## 2025-05-22 NOTE — PATIENT INSTRUCTIONS
"Counseling and Referral of Other Preventative  (Italic type indicates deductible and co-insurance are waived)    Patient Name: Randolph Blue  Today's Date: 5/22/2025    MEAL PLAN  Breakfast = protein shake (powder with water) + ground flax seed  Lunch = egg whites + lean meat + avocado  Snack = unsweetened Greek Yoghurt or nuts  Dinner = some wiggle room here if adhering to the above, but still be mindful of total calories    Carbs - no more than toddler-sized handful of rice, pasta, bread  No soft drinks or juice!    Weight Loss - Mechanics & Recommendations  -- Benefits include lower blood pressure, lower cholesterol, improved glycemic control, increased mobility, reduced strain on joints & lower risk of physical (mechanical) injury.      Calorie Restriction:   -- About:  Calorie restriction (a negative calorie balance) is the only way to lose weight (short of surgical liposuction)... Stimulants, injectables & even gastric bypass surgery all simply suppress appetite and/or make it easier to eat less - however, even in these cases, if a negative calorie balance is not achieved, weight loss does not occur.  -- Goal:  Aim for a daily deficit of 500 calories to lose about one pound per week (takes about 3500 calories to lose 1 lb).  For example, if the average adult requires 2,000 calories per day, reducing intake to 3618-5577 calories per day should result in about 1lb of weight loss per week.  Ensure that your daily intake does not drop below 1,200 calories per day in order to avoid nutrient deficiency  -- Also:  The "Decisionlink" terrence may be very helpful in counting calories to reach the targets described above.     Protein Intake:  -- About:  While in a calorie deficit, protein intake (with even modest physical activity) will help to protect lean muscle mass, ensuring that your body burns only fat to make up for your target calorie deficit (see above)  -- Goal:  Aim for approximately 120g of protein per day can help " preserve lean muscle mass during calorie restriction  -- Also:  Great sources of protein:  chicken breast, lean meats/fish, egg whites, unsweetened Greek Yoghurt, whey protein (powder is much more affordable than pre-mixed protein drinks)    ... You may also consider ...          Fiber:  --  Fiber intake increases satiety (especially when taken with or right before meals).  It also lowers cholesterol, improves glucose homeostasis, promotes regularity and is somewhat protective against colorectal cancer.  --   Sources of Fiber:       ** Supplemental fiber should be accompanied by ample hydration (about 2.5-3 liters of water per day at least) **      -   Vegetables are generally high in fiber and low in calories (root vegetables generally being the exception)    -   Avocados are incredibly high in both fiber & healthy fat     -   Ground Flax Seed:  Flax is a gentle (modest) source of fiber which is Not likely to cause bloating.  Try 1 tbsp of ground flax seed with a shake/smoothie or added to oatmeal.  Nutty flavor, affordable, well-tolerated.     -   Psyllium Husk (Metamucil):  Excellent source of fiber, but start slowly!  Start with about 1 tsp per day taken with a glass (8oz) of water, and slowly increase to 1-3 tbsp per day before or with meals as tolerated.    -   Citrucel:  Well tolerated, gentle with lower risk of bloating, safe for use in chronic kidney disease.        Limit Carbohydrates:  -- Choose higher-fiber, lower-carbohydrate meals to control hunger and appetite. Opt for carbs high in fiber and low in added sugar, such as beans and sweet potatoes, and avoid sugary drinks and chips    Increase Physical Activity:  -- Incorporate physical activity to enhance insulin sensitivity and slightly assist with achieving a calorie deficit.  Aim for strength-training activities at least two days per week, with 2-3 days of moderate to vigorous cardiovascular training (brisk walking, cycling, swimming) if  possible.        Health Maintenance         Date Due Completion Date    Shingles Vaccine (1 of 2) Never done ---    RSV Vaccine (Age 60+ and Pregnant patients) (1 - Risk 60-74 years 1-dose series) Never done ---    Hemoglobin A1c (Prediabetes) 05/28/2025 5/28/2024    TETANUS VACCINE 06/01/2032 (Originally 6/23/1983) ---    Colorectal Cancer Screening 04/04/2029 4/4/2024    Lipid Panel 10/21/2029 10/21/2024          No orders of the defined types were placed in this encounter.      The following information is provided to all patients.  This information is to help you find resources for any of the problems found today that may be affecting your health:                  Living healthy guide: www.[facility].[place].gov      Understanding Diabetes: www.diabetes.org      Eating healthy: www.cdc.gov/healthyweight      [facility] home safety checklist: www.cdc.gov/steadi/patient.html      Agency on Aging: www.goea.[place].gov      Alcoholics anonymous (AA): www.aa.org      Physical Activity: www.raven.nih.gov/pg1agor      Tobacco use: www.quitwithusla.org          none

## 2025-05-22 NOTE — ASSESSMENT & PLAN NOTE
Still follows with Heme-Onc, appointment scheduled for 7/2025.  Finished with leuprolide, testosterone low at 11 eight months ago.  Doing well, no new c/o, will continue to monitor

## 2025-06-16 ENCOUNTER — LAB VISIT (OUTPATIENT)
Dept: LAB | Facility: HOSPITAL | Age: 60
End: 2025-06-16
Attending: FAMILY MEDICINE
Payer: MEDICARE

## 2025-06-16 ENCOUNTER — RESULTS FOLLOW-UP (OUTPATIENT)
Dept: FAMILY MEDICINE | Facility: CLINIC | Age: 60
End: 2025-06-16

## 2025-06-16 DIAGNOSIS — R73.03 PRE-DIABETES: ICD-10-CM

## 2025-06-16 DIAGNOSIS — I10 ESSENTIAL HYPERTENSION: ICD-10-CM

## 2025-06-16 DIAGNOSIS — E78.5 HYPERLIPIDEMIA, UNSPECIFIED HYPERLIPIDEMIA TYPE: ICD-10-CM

## 2025-06-16 LAB
ABSOLUTE EOSINOPHIL (OHS): 0.14 K/UL
ABSOLUTE MONOCYTE (OHS): 0.63 K/UL (ref 0.3–1)
ABSOLUTE NEUTROPHIL COUNT (OHS): 4.6 K/UL (ref 1.8–7.7)
ALBUMIN SERPL BCP-MCNC: 3.5 G/DL (ref 3.5–5.2)
ALP SERPL-CCNC: 81 UNIT/L (ref 40–150)
ALT SERPL W/O P-5'-P-CCNC: 15 UNIT/L (ref 10–44)
ANION GAP (OHS): 9 MMOL/L (ref 8–16)
AST SERPL-CCNC: 19 UNIT/L (ref 11–45)
BASOPHILS # BLD AUTO: 0.04 K/UL
BASOPHILS NFR BLD AUTO: 0.6 %
BILIRUB SERPL-MCNC: 0.5 MG/DL (ref 0.1–1)
BUN SERPL-MCNC: 13 MG/DL (ref 8–23)
CALCIUM SERPL-MCNC: 9.3 MG/DL (ref 8.7–10.5)
CHLORIDE SERPL-SCNC: 106 MMOL/L (ref 95–110)
CHOLEST SERPL-MCNC: 154 MG/DL (ref 120–199)
CHOLEST/HDLC SERPL: 3.8 {RATIO} (ref 2–5)
CO2 SERPL-SCNC: 26 MMOL/L (ref 23–29)
CREAT SERPL-MCNC: 0.8 MG/DL (ref 0.5–1.4)
EAG (OHS): 117 MG/DL (ref 68–131)
ERYTHROCYTE [DISTWIDTH] IN BLOOD BY AUTOMATED COUNT: 13.8 % (ref 11.5–14.5)
GFR SERPLBLD CREATININE-BSD FMLA CKD-EPI: >60 ML/MIN/1.73/M2
GLUCOSE SERPL-MCNC: 94 MG/DL (ref 70–110)
HBA1C MFR BLD: 5.7 % (ref 4–5.6)
HCT VFR BLD AUTO: 40.3 % (ref 40–54)
HDLC SERPL-MCNC: 41 MG/DL (ref 40–75)
HDLC SERPL: 26.6 % (ref 20–50)
HGB BLD-MCNC: 12.4 GM/DL (ref 14–18)
IMM GRANULOCYTES # BLD AUTO: 0.03 K/UL (ref 0–0.04)
IMM GRANULOCYTES NFR BLD AUTO: 0.4 % (ref 0–0.5)
LDLC SERPL CALC-MCNC: 94.2 MG/DL (ref 63–159)
LYMPHOCYTES # BLD AUTO: 1.5 K/UL (ref 1–4.8)
MCH RBC QN AUTO: 26.5 PG (ref 27–31)
MCHC RBC AUTO-ENTMCNC: 30.8 G/DL (ref 32–36)
MCV RBC AUTO: 86 FL (ref 82–98)
NONHDLC SERPL-MCNC: 113 MG/DL
NUCLEATED RBC (/100WBC) (OHS): 0 /100 WBC
PLATELET # BLD AUTO: 320 K/UL (ref 150–450)
PMV BLD AUTO: 10.8 FL (ref 9.2–12.9)
POTASSIUM SERPL-SCNC: 4 MMOL/L (ref 3.5–5.1)
PROT SERPL-MCNC: 7.4 GM/DL (ref 6–8.4)
RBC # BLD AUTO: 4.68 M/UL (ref 4.6–6.2)
RELATIVE EOSINOPHIL (OHS): 2 %
RELATIVE LYMPHOCYTE (OHS): 21.6 % (ref 18–48)
RELATIVE MONOCYTE (OHS): 9.1 % (ref 4–15)
RELATIVE NEUTROPHIL (OHS): 66.3 % (ref 38–73)
SODIUM SERPL-SCNC: 141 MMOL/L (ref 136–145)
TRIGL SERPL-MCNC: 94 MG/DL (ref 30–150)
WBC # BLD AUTO: 6.94 K/UL (ref 3.9–12.7)

## 2025-06-16 PROCEDURE — 82435 ASSAY OF BLOOD CHLORIDE: CPT | Mod: HCNC

## 2025-06-16 PROCEDURE — 83036 HEMOGLOBIN GLYCOSYLATED A1C: CPT | Mod: HCNC

## 2025-06-16 PROCEDURE — 85025 COMPLETE CBC W/AUTO DIFF WBC: CPT | Mod: HCNC

## 2025-06-16 PROCEDURE — 36415 COLL VENOUS BLD VENIPUNCTURE: CPT | Mod: HCNC,PO

## 2025-06-16 PROCEDURE — 80061 LIPID PANEL: CPT | Mod: HCNC

## 2025-06-19 DIAGNOSIS — J30.9 ALLERGIC SINUSITIS: ICD-10-CM

## 2025-06-19 RX ORDER — FEXOFENADINE HYDROCHLORIDE 180 MG/1
180 TABLET, FILM COATED ORAL
Qty: 90 TABLET | Refills: 0 | OUTPATIENT
Start: 2025-06-19

## 2025-06-19 NOTE — TELEPHONE ENCOUNTER
No care due was identified.  Health [facility] Embedded Care Due Messages. Reference number: [id number].   6/19/2025 11:44:47 AM CDT

## 2025-06-19 NOTE — TELEPHONE ENCOUNTER
Refill Routing Note   Medication(s) are not appropriate for processing by [facility] for the following reason(s):        No active prescription written by provider    [facility] action(s):  Defer             Appointments  past 12m or future 3m with PCP    Date Provider   Last Visit   12/16/2024 Cee Solo, DO   Next Visit   6/24/2025 Cee Solo, DO   ED visits in past 90 days: 0        Note composed:2:52 PM 06/19/2025

## 2025-06-24 ENCOUNTER — OFFICE VISIT (OUTPATIENT)
Dept: FAMILY MEDICINE | Facility: CLINIC | Age: 60
End: 2025-06-24
Payer: MEDICARE

## 2025-06-24 VITALS
RESPIRATION RATE: 18 BRPM | DIASTOLIC BLOOD PRESSURE: 58 MMHG | HEART RATE: 81 BPM | TEMPERATURE: 98 F | BODY MASS INDEX: 40.45 KG/M2 | OXYGEN SATURATION: 97 % | HEIGHT: 67 IN | SYSTOLIC BLOOD PRESSURE: 118 MMHG | WEIGHT: 257.69 LBS

## 2025-06-24 DIAGNOSIS — E78.2 HYPERLIPIDEMIA, MIXED: ICD-10-CM

## 2025-06-24 DIAGNOSIS — I10 ESSENTIAL HYPERTENSION: ICD-10-CM

## 2025-06-24 DIAGNOSIS — J43.9 PULMONARY EMPHYSEMA, UNSPECIFIED EMPHYSEMA TYPE: ICD-10-CM

## 2025-06-24 DIAGNOSIS — C61 PROSTATE CANCER: ICD-10-CM

## 2025-06-24 DIAGNOSIS — F51.04 PSYCHOPHYSIOLOGICAL INSOMNIA: ICD-10-CM

## 2025-06-24 DIAGNOSIS — G47.33 OSA (OBSTRUCTIVE SLEEP APNEA): ICD-10-CM

## 2025-06-24 DIAGNOSIS — B35.3 TINEA PEDIS OF BOTH FEET: Primary | ICD-10-CM

## 2025-06-24 DIAGNOSIS — F41.1 GAD (GENERALIZED ANXIETY DISORDER): ICD-10-CM

## 2025-06-24 DIAGNOSIS — J32.9 CHRONIC SINUSITIS, UNSPECIFIED LOCATION: ICD-10-CM

## 2025-06-24 DIAGNOSIS — R73.03 PRE-DIABETES: ICD-10-CM

## 2025-06-24 PROCEDURE — 99999 PR PBB SHADOW E&M-EST. PATIENT-LVL V: CPT | Mod: PBBFAC,HCNC,, | Performed by: FAMILY MEDICINE

## 2025-06-24 PROCEDURE — 3008F BODY MASS INDEX DOCD: CPT | Mod: CPTII,HCNC,S$GLB, | Performed by: FAMILY MEDICINE

## 2025-06-24 PROCEDURE — 1160F RVW MEDS BY RX/DR IN RCRD: CPT | Mod: CPTII,HCNC,S$GLB, | Performed by: FAMILY MEDICINE

## 2025-06-24 PROCEDURE — 3044F HG A1C LEVEL LT 7.0%: CPT | Mod: CPTII,HCNC,S$GLB, | Performed by: FAMILY MEDICINE

## 2025-06-24 PROCEDURE — 3078F DIAST BP <80 MM HG: CPT | Mod: CPTII,HCNC,S$GLB, | Performed by: FAMILY MEDICINE

## 2025-06-24 PROCEDURE — 4010F ACE/ARB THERAPY RXD/TAKEN: CPT | Mod: CPTII,HCNC,S$GLB, | Performed by: FAMILY MEDICINE

## 2025-06-24 PROCEDURE — G2211 COMPLEX E/M VISIT ADD ON: HCPCS | Mod: HCNC,S$GLB,, | Performed by: FAMILY MEDICINE

## 2025-06-24 PROCEDURE — 1159F MED LIST DOCD IN RCRD: CPT | Mod: CPTII,HCNC,S$GLB, | Performed by: FAMILY MEDICINE

## 2025-06-24 PROCEDURE — 3074F SYST BP LT 130 MM HG: CPT | Mod: CPTII,HCNC,S$GLB, | Performed by: FAMILY MEDICINE

## 2025-06-24 PROCEDURE — 99214 OFFICE O/P EST MOD 30 MIN: CPT | Mod: HCNC,S$GLB,, | Performed by: FAMILY MEDICINE

## 2025-06-24 RX ORDER — FLUCONAZOLE 150 MG/1
150 TABLET ORAL WEEKLY
Qty: 6 TABLET | Refills: 0 | Status: SHIPPED | OUTPATIENT
Start: 2025-06-24 | End: 2025-07-30

## 2025-06-24 RX ORDER — FEXOFENADINE HCL 180 MG/1
180 TABLET ORAL DAILY
Qty: 90 TABLET | Refills: 3 | Status: SHIPPED | OUTPATIENT
Start: 2025-06-24 | End: 2026-06-24

## 2025-06-24 RX ORDER — ROSUVASTATIN CALCIUM 20 MG/1
20 TABLET, COATED ORAL DAILY
Qty: 90 TABLET | Refills: 3 | Status: SHIPPED | OUTPATIENT
Start: 2025-06-24

## 2025-06-24 NOTE — PROGRESS NOTES
Assessment & Plan:    Tinea pedis of both feet  -     fluconazole (DIFLUCAN) 150 MG Tab; Take 1 tablet (150 mg total) by mouth once a week. for 6 doses  Dispense: 6 tablet; Refill: 0    Start oral antifungal. Advised to use [place] foot powder daily to prevent infection.    Pulmonary emphysema, unspecified emphysema type  Controlled. Continue therapy plan per Pulmonology.    Chronic sinusitis, unspecified location  -     fexofenadine (ALLEGRA) 180 MG tablet; Take 1 tablet (180 mg total) by mouth once daily.  Dispense: 90 tablet; Refill: 3    Controlled. Medication(s) refilled.     SUZAN (obstructive sleep apnea)  In the process of getting set up with an APAP. Discussed importance of treated SUZAN to prevent adverse CV outcomes.    Essential hypertension  -     CBC Auto Differential; Future; Expected date: 06/24/2025  -     Comprehensive Metabolic Panel; Future; Expected date: 06/24/2025    Controlled. Continue current therapy.     Hyperlipidemia, mixed  -     rosuvastatin (CRESTOR) 20 MG tablet; Take 1 tablet (20 mg total) by mouth once daily.  Dispense: 90 tablet; Refill: 3  -     Lipid Panel; Future; Expected date: 06/24/2025    Controlled. Medication(s) refilled.     Pre-diabetes  -     Hemoglobin A1C; Future; Expected date: 06/24/2025    Improvement in the A1c to 5.7. Monitor with routine labs q6 months.    Psychophysiological insomnia  Controlled. Continue current therapy.     SILVIA (generalized anxiety disorder)  Controlled. Continue current therapy.     Prostate cancer  Continue management plan per Heme/Onc.    Recommended RSV vaccine.      Follow-up: Follow up in about 6 months (around 12/24/2025).  ______________________________________________________________________    Chief Complaint  Chief Complaint   Patient presents with    Health Maintenance       HPI  Randolph Blue is a 60 y.o. male with medical diagnoses as listed in the medical history and problem list that presents to the office to follow up on his  chronic conditions. Since his last visit, patient has been following up with Pulmonology for emphysema and SUZAN. Melquiades was D/Rodrigo and he was started on Advair with excellent control of his chronic respiratory symptoms. His insurance will be changing to  in July so he is hoping to get his APAP approved. His only new concern is itching in both feet. Denies rashes, toenail discoloration.     Most recent pertinent workup:     Last CBC Results:   Lab Results   Component Value Date    WBC 6.94 06/16/2025    HGB 12.4 (L) 06/16/2025    HCT 40.3 06/16/2025     06/16/2025       Last CMP Results  Lab Results   Component Value Date     06/16/2025    K 4.0 06/16/2025     06/16/2025    CO2 26 06/16/2025    BUN 13 06/16/2025    CREATININE 0.8 06/16/2025    CALCIUM 9.3 06/16/2025    ALBUMIN 3.5 06/16/2025    AST 19 06/16/2025    ALT 15 06/16/2025       Last Lipids  Lab Results   Component Value Date    CHOL 154 06/16/2025    TRIG 94 06/16/2025    HDL 41 06/16/2025    LDLCALC 94.2 06/16/2025       Last A1C  Lab Results   Component Value Date    HGBA1C 5.7 (H) 06/16/2025         Health Maintenance         Date Due Completion Date    RSV Vaccine (Age 60+ and Pregnant patients) (1 - Risk 60-74 years 1-dose series) Never done ---    Shingles Vaccine (2 of 2) 07/31/2025 6/5/2025    Hemoglobin A1c (Prediabetes) 06/16/2026 6/16/2025    Colorectal Cancer Screening 04/04/2029 4/4/2024    Lipid Panel 06/16/2030 6/16/2025    TETANUS VACCINE 06/05/2035 6/5/2025              PAST MEDICAL HISTORY:  Past Medical History:   Diagnosis Date    Arthritis     Bronchitis     Cervical arthritis     SILVIA (generalized anxiety disorder) 01/03/2014    GERD (gastroesophageal reflux disease)     Glaucoma     Hyperlipidemia, mixed 04/03/2014    Hypertension        PAST SURGICAL HISTORY:  Past Surgical History:   Procedure Laterality Date    COLONOSCOPY N/A 4/4/2024    Procedure: COLONOSCOPY;  Surgeon: Tj Clarke MD;  Location: [facility];   "Service: Endoscopy;  Laterality: N/A;  Referred by: Dr. Cee Solo  Prep: PEG  Route instructions sent: portal  Other concerns: Rectal bleeding, pt just d/c'd from [facility]    KNEE SURGERY      bilateral    MANDIBLE FRACTURE SURGERY      PROSTATE SURGERY      SHOULDER SURGERY      [place] at [facility].        SOCIAL HISTORY:  Social History[1]    FAMILY HISTORY:  Family History   Problem Relation Name Age of Onset    Cancer Mother      Hypertension Mother      Heart disease Father      Hypertension Maternal Uncle      Hypertension Paternal Uncle      Hypertension Maternal Grandmother      Heart disease Paternal Grandmother      Hypertension Paternal Grandmother         ALLERGIES AND MEDICATIONS: updated and reviewed.  Review of patient's allergies indicates:   Allergen Reactions    Percocet [oxycodone-acetaminophen]      Other reaction(s): Rash     Current Medications[2]      ROS  Review of Systems   Respiratory:  Negative for cough and shortness of breath.    Skin:  Negative for color change and rash.           Physical Exam  Vitals:    06/24/25 1420   BP: (!) 118/58   Patient Position: Sitting   Pulse: 81   Resp: 18   Temp: 98 °F (36.7 °C)   TempSrc: Oral   SpO2: 97%   Weight: 116.9 kg (257 lb 11.5 oz)   Height: 5' 7" (1.702 m)    Body mass index is 40.36 kg/m².  Weight: 116.9 kg (257 lb 11.5 oz)   Height: 5' 7" (170.2 cm)   Physical Exam  Constitutional:       General: He is not in acute distress.     Appearance: He is obese.   HENT:      Head: Normocephalic and atraumatic.   Neck:      Thyroid: No thyromegaly.      Vascular: No carotid bruit.   Cardiovascular:      Rate and Rhythm: Normal rate and regular rhythm.      Pulses: Normal pulses.      Heart sounds: Normal heart sounds.   Pulmonary:      Effort: Pulmonary effort is normal. No respiratory distress.      Breath sounds: Normal breath sounds.   Musculoskeletal:      Cervical back: Neck supple.      Right lower leg: No edema.      Left lower " leg: No edema.   Lymphadenopathy:      Cervical: No cervical adenopathy.   Skin:     General: Skin is warm and dry.      Findings: No rash.   Neurological:      General: No focal deficit present.      Mental Status: He is alert and oriented to person, place, and time.   Psychiatric:         Mood and Affect: Mood normal.         Behavior: Behavior normal.         Thought Content: Thought content normal.                  [1]   Social History  Socioeconomic History    Marital status: Single   Tobacco Use    Smoking status: Never    Smokeless tobacco: Never   Substance and Sexual Activity    Alcohol use: Not Currently     Alcohol/week: 3.3 - 4.2 standard drinks of alcohol     Types: 4 - 5 Standard drinks or equivalent per week     Comment: No alcohol x 2 weeks    Drug use: No    Sexual activity: Not Currently     Social Drivers of Health     Financial Resource Strain: Low Risk  (5/22/2025)    Overall Financial Resource Strain (CARDIA)     Difficulty of Paying Living Expenses: Not hard at all   Food Insecurity: No Food Insecurity (5/22/2025)    Hunger Vital Sign     Worried About Running Out of Food in the Last Year: Never true     Ran Out of Food in the Last Year: Never true   Transportation Needs: No Transportation Needs (5/22/2025)    PRAPARE - Transportation     Lack of Transportation (Medical): No     Lack of Transportation (Non-Medical): No   Physical Activity: Sufficiently Active (5/22/2025)    Exercise Vital Sign     Days of Exercise per Week: 3 days     Minutes of Exercise per Session: 60 min   Stress: No Stress Concern Present (5/22/2025)    [place] [employer] - Occupational Stress Questionnaire     Feeling of Stress : Only a little   Housing Stability: Low Risk  (5/22/2025)    Housing Stability Vital Sign     Unable to Pay for Housing in the Last Year: No     Number of Times Moved in the Last Year: 0     Homeless in the Last Year: No   [2]   Current Outpatient Medications   Medication Sig  Dispense Refill    acetaminophen (TYLENOL) 500 MG tablet Take 1 tablet (500 mg total) by mouth every 4 (four) hours as needed for Pain. 20 tablet 0    albuterol (PROVENTIL/VENTOLIN HFA) 90 mcg/actuation inhaler Inhale 1-2 puffs into the lungs every 4 (four) hours as needed for Wheezing. 18 g 11    amitriptyline (ELAVIL) 50 MG tablet Take 25 mg by mouth every evening.      azelastine (ASTELIN) 137 mcg (0.1 %) nasal spray 1 spray (137 mcg total) by Nasal route 2 (two) times daily. 30 mL 3    diclofenac (CATAFLAM) 50 MG tablet Take 50 mg by mouth 2 (two) times daily as needed.      FLUoxetine 40 MG capsule Take 1 capsule (40 mg total) by mouth once daily. 90 capsule 3    fluticasone propionate (FLONASE) 50 mcg/actuation nasal spray 1 spray (50 mcg total) by Each Nostril route 2 (two) times daily as needed for Rhinitis. 16 g 11    fluticasone-salmeterol diskus inhaler 250-50 mcg Inhale 1 puff into the lungs 2 (two) times daily. Controller 60 each 11    hydroCHLOROthiazide (HYDRODIURIL) 25 MG tablet Take 1 tablet by mouth once daily 90 tablet 1    latanoprost 0.005 % ophthalmic solution       leuprolide acetate, 6 month, (ELIGARD) 45 mg injection Inject 45 mg into the skin every 6 (six) months.      losartan (COZAAR) 50 MG tablet Take 1 tablet by mouth once daily 90 tablet 3    montelukast (SINGULAIR) 10 mg tablet Take 1 tablet (10 mg total) by mouth every evening. 90 tablet 3    mv-min/folic/K1/lycopen/lutein (CENTRUM MEN 50 PLUS MINIS ORAL) Take by mouth.      fexofenadine (ALLEGRA) 180 MG tablet Take 1 tablet (180 mg total) by mouth once daily. 90 tablet 3    fluconazole (DIFLUCAN) 150 MG Tab Take 1 tablet (150 mg total) by mouth once a week. for 6 doses 6 tablet 0    rosuvastatin (CRESTOR) 20 MG tablet Take 1 tablet (20 mg total) by mouth once daily. 90 tablet 3     No current facility-administered medications for this visit.

## 2025-06-30 ENCOUNTER — OFFICE VISIT (OUTPATIENT)
Dept: PULMONOLOGY | Facility: CLINIC | Age: 60
End: 2025-06-30
Payer: MEDICAID

## 2025-06-30 VITALS — OXYGEN SATURATION: 94 % | DIASTOLIC BLOOD PRESSURE: 60 MMHG | SYSTOLIC BLOOD PRESSURE: 114 MMHG | HEART RATE: 74 BPM

## 2025-06-30 DIAGNOSIS — R91.8 MULTIPLE PULMONARY NODULES: ICD-10-CM

## 2025-06-30 DIAGNOSIS — E66.813 CLASS 3 SEVERE OBESITY DUE TO EXCESS CALORIES WITH SERIOUS COMORBIDITY AND BODY MASS INDEX (BMI) OF 40.0 TO 44.9 IN ADULT: ICD-10-CM

## 2025-06-30 DIAGNOSIS — J45.30 MILD PERSISTENT ASTHMA, UNSPECIFIED WHETHER COMPLICATED: Primary | ICD-10-CM

## 2025-06-30 DIAGNOSIS — G47.33 OBSTRUCTIVE SLEEP APNEA SYNDROME: ICD-10-CM

## 2025-06-30 DIAGNOSIS — E66.01 CLASS 3 SEVERE OBESITY DUE TO EXCESS CALORIES WITH SERIOUS COMORBIDITY AND BODY MASS INDEX (BMI) OF 40.0 TO 44.9 IN ADULT: ICD-10-CM

## 2025-06-30 DIAGNOSIS — J43.9 PULMONARY EMPHYSEMA, UNSPECIFIED EMPHYSEMA TYPE: ICD-10-CM

## 2025-06-30 PROCEDURE — 3078F DIAST BP <80 MM HG: CPT | Mod: CPTII,S$GLB,, | Performed by: INTERNAL MEDICINE

## 2025-06-30 PROCEDURE — 99214 OFFICE O/P EST MOD 30 MIN: CPT | Mod: S$GLB,,, | Performed by: INTERNAL MEDICINE

## 2025-06-30 PROCEDURE — 99999 PR PBB SHADOW E&M-EST. PATIENT-LVL IV: CPT | Mod: PBBFAC,HCNC,, | Performed by: INTERNAL MEDICINE

## 2025-06-30 PROCEDURE — 1159F MED LIST DOCD IN RCRD: CPT | Mod: CPTII,S$GLB,, | Performed by: INTERNAL MEDICINE

## 2025-06-30 PROCEDURE — 3074F SYST BP LT 130 MM HG: CPT | Mod: CPTII,S$GLB,, | Performed by: INTERNAL MEDICINE

## 2025-06-30 PROCEDURE — 3044F HG A1C LEVEL LT 7.0%: CPT | Mod: CPTII,S$GLB,, | Performed by: INTERNAL MEDICINE

## 2025-06-30 PROCEDURE — 4010F ACE/ARB THERAPY RXD/TAKEN: CPT | Mod: CPTII,S$GLB,, | Performed by: INTERNAL MEDICINE

## 2025-06-30 NOTE — PATIENT INSTRUCTIONS
I am prescribing you airspura to help with your asthma. Please take 1-2 puffs as needed for shortness of breath    Please ensure your gargle after each use. There are little to no immediate side effects with this medication as there is minimal absorption to the blood. A low grade yeast infection in the mouth called thrush can develop if you do not rinse/gargle your mouth after use    I am also prescribing an albuterol inhaler to take AS NEEDED for shortness of breath above your baseline. This medication can increase your heart rate. If using for more than 2 times per day, please let me know.    For General information on Inhalers:    https://[email or web address].org/conditions/medications/asthma-medications/devices

## 2025-06-30 NOTE — PROGRESS NOTES
General Pulmonary Clinic  Follow Up Patient Visit    Chief Complaint: cough, emphysema, pulmonary nodules     HPI     Randolph Blue is a 60 y.o. male with a history of prostate cancer s/p PLND 9/2021/EBRT/ADT , obesity, GERD, HTN, HLD presenting with chief complaint of lung nodules, cough w/ emphysema.    Interval hx:  Sleep study w/ mild sleep apnea  Did not get CPAP due to deductible  He  is not using his ICS/LABA          Presenting HPI  # pulmonary nodules  # mild paraseptal emphysema  # cough    Cough that began 1 year ago  usually dry but intermittently productive of yellow-clear phlegm. It is associated with wheezing but notes wheezing preceded his cough by many years--since childhood w  hx of recurrent bronchitis in childhood requiring hospitalizations.  He also has significant allergic rhinitis w/ post nasal drip, rhinorrhea, throat clearing. He is also on an ARB since 2015. Denies GERD or dysphagia. No clear triggers to his wheezing or cough.      He was recently told by unspecified physician he may have asbestosis?  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  hobbies include none  Exposures: no tobacco/marijuana/vaping exposure, no current water damage/mold, no pets  Childhood history:allergies and recurent bronchitis - marcus 5-6 x per years      CT chest 4/202 personally interpreted  Mild emphysema, basilar atelectasis  Nodules  - RUL - 3 nodules < 4 mm  - lingula 4-5 mm nodules  -  LLL 6 x 4 mm nodules, 1=2 mm nodule    Records reviewed with the following findings ---  5/2024       Objective   Past History     Past Medical History:  Past Medical History:   Diagnosis Date    Arthritis     Bronchitis     Cervical arthritis     SILVIA (generalized anxiety disorder) 01/03/2014    GERD (gastroesophageal reflux disease)     Glaucoma     Hyperlipidemia, mixed 04/03/2014    Hypertension          Past Surgical History:  Past Surgical History:   Procedure Laterality Date    COLONOSCOPY N/A 4/4/2024    Procedure: COLONOSCOPY;  Surgeon: Tj Clarke MD;  Location: [facility];  Service: Endoscopy;  Laterality: N/A;  Referred by: Dr. Cee Solo  Prep: PEG  Route instructions sent: portal  Other concerns: Rectal bleeding, pt just d/c'd from [facility]    KNEE SURGERY      bilateral    MANDIBLE FRACTURE SURGERY      PROSTATE SURGERY      SHOULDER SURGERY      [place] at [facility].          Social History:   Social History     Socioeconomic History    Marital status: Single   Tobacco Use    Smoking status: Never    Smokeless tobacco: Never   Substance and Sexual Activity    Alcohol use: Not Currently     Alcohol/week: 3.3 - 4.2 standard drinks of alcohol     Types: 4 - 5 Standard drinks or equivalent per week     Comment: No alcohol x 2 weeks    Drug use: No    Sexual activity: Not Currently     Social Drivers of Health     Financial Resource Strain: Low Risk  (5/22/2025)    Overall Financial Resource Strain (CARDIA)     Difficulty of Paying Living Expenses: Not hard at all   Food Insecurity: No Food Insecurity (5/22/2025)    Hunger Vital Sign     Worried About Running Out of Food in the Last Year: Never true     Ran Out of Food in the Last Year: Never true   Transportation Needs: No Transportation Needs (5/22/2025)    PRAPARE - Transportation     Lack of Transportation (Medical): No     Lack of Transportation (Non-Medical): No   Physical Activity: Sufficiently Active (5/22/2025)    Exercise  Vital Sign     Days of Exercise per Week: 3 days     Minutes of Exercise per Session: 60 min   Stress: No Stress Concern Present (5/22/2025)    [place] [employer] - Occupational Stress Questionnaire     Feeling of Stress : Only a little   Housing Stability: Low Risk  (5/22/2025)    Housing Stability Vital Sign     Unable to Pay for Housing in the Last Year: No     Number of Times Moved in the Last Year: 0     Homeless in the Last Year: No         Family Hx:  No family history of pulmonary fibrosis, pre-mature graying of hair, cirrhosis, or hematologic malignancies  No family history of emphysema or spontaneous PTX  no family history of lung cancer or lymphoma    Allergies and Pertinent Medications   Allergies and Medications Reviewed    Percocet [oxycodone-acetaminophen]  Current Outpatient Medications on File Prior to Visit   Medication Sig Dispense Refill    acetaminophen (TYLENOL) 500 MG tablet Take 1 tablet (500 mg total) by mouth every 4 (four) hours as needed for Pain. 20 tablet 0    albuterol (PROVENTIL/VENTOLIN HFA) 90 mcg/actuation inhaler Inhale 1-2 puffs into the lungs every 4 (four) hours as needed for Wheezing. 18 g 11    amitriptyline (ELAVIL) 50 MG tablet Take 25 mg by mouth every evening.      azelastine (ASTELIN) 137 mcg (0.1 %) nasal spray 1 spray (137 mcg total) by Nasal route 2 (two) times daily. 30 mL 3    diclofenac (CATAFLAM) 50 MG tablet Take 50 mg by mouth 2 (two) times daily as needed.      fexofenadine (ALLEGRA) 180 MG tablet Take 1 tablet (180 mg total) by mouth once daily. 90 tablet 3    fluconazole (DIFLUCAN) 150 MG Tab Take 1 tablet (150 mg total) by mouth once a week. for 6 doses 6 tablet 0    FLUoxetine 40 MG capsule Take 1 capsule (40 mg total) by mouth once daily. 90 capsule 3    fluticasone propionate (FLONASE) 50 mcg/actuation nasal spray 1 spray (50 mcg total) by Each Nostril route 2 (two) times daily as needed for Rhinitis. 16 g 11     fluticasone-salmeterol diskus inhaler 250-50 mcg Inhale 1 puff into the lungs 2 (two) times daily. Controller 60 each 11    hydroCHLOROthiazide (HYDRODIURIL) 25 MG tablet Take 1 tablet by mouth once daily 90 tablet 1    latanoprost 0.005 % ophthalmic solution       leuprolide acetate, 6 month, (ELIGARD) 45 mg injection Inject 45 mg into the skin every 6 (six) months.      losartan (COZAAR) 50 MG tablet Take 1 tablet by mouth once daily 90 tablet 3    montelukast (SINGULAIR) 10 mg tablet Take 1 tablet (10 mg total) by mouth every evening. 90 tablet 3    mv-min/folic/K1/lycopen/lutein (CENTRUM MEN 50 PLUS MINIS ORAL) Take by mouth.      rosuvastatin (CRESTOR) 20 MG tablet Take 1 tablet (20 mg total) by mouth once daily. 90 tablet 3     No current facility-administered medications on file prior to visit.              Physical Exam   There were no vitals taken for this visit.      Constitutional: No acute distress, Atraumatic   HEENT: moist mucus membranes, extraocular movements intact, mallampati IV  Cardiovascular: regular rate and rhythm, no murmurs, rubs or gallops  Pulmonary: normal respiratory rate and chest rise, no chest wall deformity, normal breath sounds with no wheezing or crackles  Abdominal: non-distended, bowel sounds present  Musculoskeletal: No lower extremity edema, no clubbing  Neurological: normal speech/neetu, moves all extremities against gravity  Skin: no finger cyanosis, no rashes on exposed body parts  Psych: Appropriate affect, normal mood       Diagnostic Studies      All diagnostic studies relevant to chief complaint reviewed personally    Labs:  Lab Results   Component Value Date    WBC 6.94 06/16/2025    HGB 12.4 (L) 06/16/2025    HGB 12.5 (L) 08/29/2024    HGB 14.9 05/28/2024     06/16/2025     05/28/2024    MCV 86 06/16/2025    MCV 81.0 08/29/2024    MCV 85 05/28/2024     Lab Results   Component Value Date     06/16/2025     01/30/2025     05/28/2024    K  "4.0 06/16/2025    K 3.9 01/30/2025    K 3.7 05/28/2024    CO2 26 06/16/2025    CO2 27 01/30/2025    CO2 26 05/28/2024    BUN 13 06/16/2025    CREATININE 0.8 06/16/2025    MG 1.8 03/31/2024     Lab Results   Component Value Date    AST 19 06/16/2025    AST 17 01/30/2025    AST 17 05/28/2024    ALT 15 06/16/2025    ALT 14 01/30/2025    ALT 15 05/28/2024    ALBUMIN 3.5 06/16/2025    ALBUMIN 3.8 01/30/2025    ALBUMIN 3.8 05/28/2024    PROT 7.4 06/16/2025    PROT 8.8 (H) 05/28/2024    BILITOT 0.5 06/16/2025    BILITOT 0.7 01/30/2025    BILITOT 1.0 05/28/2024         PFTs:  Pulmonary Functions Testing Results:  No results found for: "FEV1", "FVC", "GLW3JRE", "TLC", "DLCO"             TTE:  No results found for this or any previous visit.            Assessment and Plan   Randolph Blue is a 60 y.o. male  with a history of prostate cancer s/p PLND 9/2021/EBRT/ADT , obesity, GERD, HTN, HLD presenting with chief complaint of lung nodules, cough w/ emphysema.    Problem List:  # cough w/ suspected airway disease, emphysema - chronic, stable - given childhood history asthma is a possibility and may explain normal PFTs outside of exposure. Minimal emphysema. Symptoms are well controlled and he is not using ICS/LABA. Change to air spura as needed for symptoms.    # mild sleep apnea -chronic, stable - AHI 9.4 ordered auto-CPAP, reviewed importance of adherence he will call and arrange delivery  # obesity - BMI 39 - chronic, stable - again emphasized importance of  weight loss  for asthma control, sleep apnea improvement and overall health. He will speak w/ his PCP  # pulmonary nodules - chronic - stable in size over 1 year, never smoker so no additional folow up needed    Explained to the patient I work Monday-Thursdays, so any results or messages received after working hours Thursday through Monday AM would not be addressed until I was back in the office. If he/she experienced any acute symptoms he/she should go the emergency room " for immediate help.    Differential, diagnoses, diagnostic work up, and possible treatments were discussed with the patient. Questions were answered.    Return in June    Magaly Riley MD  Pulmonary-Critical Care Medicine              For this visit, the following time was spent  preparing to see the patient (e.g., review of tests) 6 minutes  obtaining and/or reviewing separately obtained history 0 minutes  Performing a medically necessary appropriate examination and/or evaluation 10 minutes  Counseling and educating the patient/family/caregiver 11minutes  Ordering medications, tests, or procedures 1 minutes  Referring and communicating with other health care professionals (when not reported separately) 0minutes  Documenting clinical information in the electronic or other health record4 minutes  Care coordination (not reported separately) 0minutes    Total time = 31 minutes

## 2025-07-21 ENCOUNTER — TELEPHONE (OUTPATIENT)
Dept: FAMILY MEDICINE | Facility: CLINIC | Age: 60
End: 2025-07-21
Payer: MEDICARE

## 2025-07-21 NOTE — TELEPHONE ENCOUNTER
r Copied from CRM #[id number]. Topic: General Inquiry - Return Call  >> Jul 21, 2025  2:48 PM Med Assistant Nicci wrote:  Type:  Patient Returning Call    Who Called:  patient    Who Left Message for Patient:  Estela Ontiveros LPN      Does the patient know what this is regarding?: yes    Would the patient rather a call back or a response via [facility]?  call    Best Call Back Number: [phone] (M)      Additional Information:  none

## 2025-07-21 NOTE — TELEPHONE ENCOUNTER
Copied from CRM #[id number]. Topic: General Inquiry - Patient Advice  >> Jul 21, 2025 11:39 AM Domonique wrote:  Type: Patient Call Back    Who called: pt     What is the request in detail:pt is requesting a call back regarding referral for orthopedics. Pt stated he is having pain in L knee and R shoulder pain.Please contact to further discuss and advise.        Can the clinic reply by [facility]? n    Would the patient rather a call back or a response via [facility]? Call     Best call back number:.[phone]

## 2025-07-22 ENCOUNTER — OFFICE VISIT (OUTPATIENT)
Dept: FAMILY MEDICINE | Facility: CLINIC | Age: 60
End: 2025-07-22
Payer: MEDICARE

## 2025-07-22 VITALS
OXYGEN SATURATION: 96 % | DIASTOLIC BLOOD PRESSURE: 62 MMHG | SYSTOLIC BLOOD PRESSURE: 110 MMHG | BODY MASS INDEX: 40.62 KG/M2 | HEIGHT: 67 IN | WEIGHT: 258.81 LBS | HEART RATE: 70 BPM | TEMPERATURE: 98 F

## 2025-07-22 DIAGNOSIS — G89.29 CHRONIC PAIN OF LEFT KNEE: Primary | ICD-10-CM

## 2025-07-22 DIAGNOSIS — E66.813 CLASS 3 SEVERE OBESITY DUE TO EXCESS CALORIES WITH SERIOUS COMORBIDITY AND BODY MASS INDEX (BMI) OF 40.0 TO 44.9 IN ADULT: ICD-10-CM

## 2025-07-22 DIAGNOSIS — G89.29 CHRONIC RIGHT SHOULDER PAIN: ICD-10-CM

## 2025-07-22 DIAGNOSIS — I10 PRIMARY HYPERTENSION: ICD-10-CM

## 2025-07-22 DIAGNOSIS — M25.562 LEFT KNEE PAIN, UNSPECIFIED CHRONICITY: ICD-10-CM

## 2025-07-22 DIAGNOSIS — M25.511 CHRONIC RIGHT SHOULDER PAIN: ICD-10-CM

## 2025-07-22 DIAGNOSIS — M25.562 CHRONIC PAIN OF LEFT KNEE: Primary | ICD-10-CM

## 2025-07-22 DIAGNOSIS — M25.511 RIGHT SHOULDER PAIN, UNSPECIFIED CHRONICITY: Primary | ICD-10-CM

## 2025-07-22 PROBLEM — M25.561 CHRONIC PAIN OF RIGHT KNEE: Status: ACTIVE | Noted: 2025-07-22

## 2025-07-22 PROCEDURE — 3044F HG A1C LEVEL LT 7.0%: CPT | Mod: CPTII,S$GLB,,

## 2025-07-22 PROCEDURE — 1160F RVW MEDS BY RX/DR IN RCRD: CPT | Mod: CPTII,S$GLB,,

## 2025-07-22 PROCEDURE — 99213 OFFICE O/P EST LOW 20 MIN: CPT | Mod: S$GLB,,,

## 2025-07-22 PROCEDURE — 1159F MED LIST DOCD IN RCRD: CPT | Mod: CPTII,S$GLB,,

## 2025-07-22 PROCEDURE — 3008F BODY MASS INDEX DOCD: CPT | Mod: CPTII,S$GLB,,

## 2025-07-22 PROCEDURE — 99999 PR PBB SHADOW E&M-EST. PATIENT-LVL V: CPT | Mod: PBBFAC,,,

## 2025-07-22 PROCEDURE — 3078F DIAST BP <80 MM HG: CPT | Mod: CPTII,S$GLB,,

## 2025-07-22 PROCEDURE — G2211 COMPLEX E/M VISIT ADD ON: HCPCS | Mod: S$GLB,,,

## 2025-07-22 PROCEDURE — 3074F SYST BP LT 130 MM HG: CPT | Mod: CPTII,S$GLB,,

## 2025-07-22 PROCEDURE — 4010F ACE/ARB THERAPY RXD/TAKEN: CPT | Mod: CPTII,S$GLB,,

## 2025-07-22 RX ORDER — FLUTICASONE PROPIONATE AND SALMETEROL 250; 50 UG/1; UG/1
1 POWDER RESPIRATORY (INHALATION) 2 TIMES DAILY
COMMUNITY
Start: 2025-07-20

## 2025-07-22 NOTE — ASSESSMENT & PLAN NOTE
Blood pressure is very well-controlled on losartan 50, hydrochlorothiazide 25.  Pressure today is 110/62.  We will continue to monitor, no changes today

## 2025-07-22 NOTE — PROGRESS NOTES
Assessment: 60 y.o. male with acute right knee pain     We discussed the findings on xray and clinical exam as well as the natural history of arthritis. We discussed non operative and operative treatment options including injections, viscosupplementation, physical therapy and PO NSAIDs.      Plan:   - MRI R knee  - RTC once MRI complete  - Will focus on knee at this time because of acute injury - I am happy to see him back in the future for the right shoulder     This note was generated with the assistance of ambient listening technology. Verbal consent was obtained by the patient and accompanying visitor(s) for the recording of patient appointment to facilitate this note. I attest to having reviewed and edited the generated note for accuracy, though some syntax or spelling errors may persist. Please contact the author of this note for any clarification.       All questions were answered in detail. The patient is in full agreement with the treatment plan and will proceed accordingly.    A note notifying Sohail Cobb of my findings was sent via the electronic medical record     Chief Complaint   Patient presents with    Left Knee - Pain     This patient was seen in consultation at the request of Sohail Cobb     HPI (7/22/25): Randolph Blue is a 60 y.o. male who presents today complaining of right knee pain  History of Present Illness    CHIEF COMPLAINT:  Right knee pain following a work-related injury.    HPI:  Randolph presents for evaluation of bilateral knee pain, with the right knee being more symptomatic since a recent injury on June 4th while working at the [employer]. He slipped on a wet floor mat while carrying a full tray, causing his knee to twist and pop. He continued to work for the next two days despite the pain. He initially reported the incident to his supervisor but did not file a worker's compensation claim immediately due to miscommunication.  He has had knee issues in thie past but this injury  acutely exacerbate d his pain. It is worse with walking and standing.       He has a history of shoulder issues, with five rotator cuff surgeries on his right shoulder and one on his left. His right shoulder has occasional pain, particularly at night or with certain movements. However, the shoulder pain is not constant or severe. Dr Salazar recently ordered an MRI which he did not have done because his copay was too high. He thinks it will be less expensive within the [facility] system         This is the extent of the patient's complaints at this time.           Review of patient's allergies indicates:   Allergen Reactions    Percocet [oxycodone-acetaminophen]      Other reaction(s): Rash       Current Medications[1]    Past Medical History:   Diagnosis Date    Arthritis     Bronchitis     Cervical arthritis     SILVIA (generalized anxiety disorder) 01/03/2014    GERD (gastroesophageal reflux disease)     Glaucoma     Hyperlipidemia, mixed 04/03/2014    Hypertension        Problem List[2]    Past Surgical History:   Procedure Laterality Date    COLONOSCOPY N/A 4/4/2024    Procedure: COLONOSCOPY;  Surgeon: Tj Clarke MD;  [facility]: [facility];  Service: Endoscopy;  Laterality: N/A;  Referred by: Dr. Cee Solo  Prep: PEG  Route instructions sent: portal  Other concerns: Rectal bleeding, pt just d/c'd from [facility]    KNEE SURGERY      bilateral    MANDIBLE FRACTURE SURGERY      PROSTATE SURGERY      SHOULDER SURGERY      [place] at [facility].        Social History[3]    Family History   Problem Relation Name Age of Onset    Cancer Mother      Hypertension Mother      Heart disease Father      Hypertension Maternal Uncle      Hypertension Paternal Uncle      Hypertension Maternal Grandmother      Heart disease Paternal Grandmother      Hypertension Paternal Grandmother         Physical Exam:   Vitals:    07/28/25 1400   PainSc:   5   PainLoc: Knee       General: Patient is alert, awake and oriented to time,  place and person. Mood and affect are appropriate.  Patient does not appear to be in any distress, denies any constitutional symptoms and appears stated age.   HEENT: Pupils are equal and round, sclera are not injected. External examination of ears and nose reveals no abnormalities. Cranial nerves II-X are grossly intact  Skin: no rashes, abrasions or open wounds on the affected extremity   Resp: No respiratory distress or audible wheezing   CV: 2+  pulses, all extremities warm and well perfused   Right knee(s)  Localizes pain over MJL  No swelling OR  erythema    Moderate effusion   Active ROM: 0 - 120  No varus/valgus deformity   Tender to palpation over medial joint line   good  quadricep muscle tone and bulk; no atrophy compared to contralateral extremity   normal (0 - 2 mm) Anterior drawer   normal (0 - 2 mm) posterior drawer   negative valgus instability   negative varus instability   + medial yareli  No NV changes     Imaging:  3 views right knee:  mild joint space narrowing, small osteophyte formation     I personally reviewed and interpreted the patient's imaging obtained today in clinic     This note was created by combination of typed  and M-Modal dictation. Transcription and phonetic errors may be present.  If there are any questions, please contact me.    Current Medications[4]          [1]   Current Outpatient Medications:     acetaminophen (TYLENOL) 500 MG tablet, Take 1 tablet (500 mg total) by mouth every 4 (four) hours as needed for Pain., Disp: 20 tablet, Rfl: 0    albuterol (PROVENTIL/VENTOLIN HFA) 90 mcg/actuation inhaler, Inhale 1-2 puffs into the lungs every 4 (four) hours as needed for Wheezing., Disp: 18 g, Rfl: 11    albuterol-budesonide (AIRSUPRA) 90-80 mcg/actuation, Inhale 2 puffs into the lungs every 4 (four) hours as needed (for shortness of breath and wheezing)., Disp: 10.7 g, Rfl: 11    amitriptyline (ELAVIL) 50 MG tablet, Take 25 mg by mouth every evening., Disp: , Rfl:      azelastine (ASTELIN) 137 mcg (0.1 %) nasal spray, 1 spray (137 mcg total) by Nasal route 2 (two) times daily., Disp: 30 mL, Rfl: 3    diclofenac (CATAFLAM) 50 MG tablet, Take 50 mg by mouth 2 (two) times daily as needed., Disp: , Rfl:     fexofenadine (ALLEGRA) 180 MG tablet, Take 1 tablet (180 mg total) by mouth once daily., Disp: 90 tablet, Rfl: 3    fluconazole (DIFLUCAN) 150 MG Tab, Take 1 tablet (150 mg total) by mouth once a week. for 6 doses, Disp: 6 tablet, Rfl: 0    FLUoxetine 40 MG capsule, Take 1 capsule (40 mg total) by mouth once daily., Disp: 90 capsule, Rfl: 3    fluticasone propionate (FLONASE) 50 mcg/actuation nasal spray, 1 spray (50 mcg total) by Each Nostril route 2 (two) times daily as needed for Rhinitis., Disp: 16 g, Rfl: 11    fluticasone-salmeterol diskus inhaler 250-50 mcg, Inhale 1 puff into the lungs 2 (two) times daily., Disp: , Rfl:     hydroCHLOROthiazide (HYDRODIURIL) 25 MG tablet, Take 1 tablet by mouth once daily, Disp: 90 tablet, Rfl: 1    latanoprost 0.005 % ophthalmic solution, , Disp: , Rfl:     leuprolide acetate, 6 month, (ELIGARD) 45 mg injection, Inject 45 mg into the skin every 6 (six) months., Disp: , Rfl:     losartan (COZAAR) 50 MG tablet, Take 1 tablet by mouth once daily, Disp: 90 tablet, Rfl: 3    montelukast (SINGULAIR) 10 mg tablet, Take 1 tablet (10 mg total) by mouth every evening., Disp: 90 tablet, Rfl: 3    mv-min/folic/K1/lycopen/lutein (CENTRUM MEN 50 PLUS MINIS ORAL), Take by mouth., Disp: , Rfl:     rosuvastatin (CRESTOR) 20 MG tablet, Take 1 tablet (20 mg total) by mouth once daily., Disp: 90 tablet, Rfl: 3  [2]   Patient Active Problem List  Diagnosis    BPH (benign prostatic hyperplasia)    Anxiety    Hyperlipidemia, mixed    Class 3 severe obesity due to excess calories with serious comorbidity and body mass index (BMI) of 40.0 to 44.9 in adult    Hypertension    Suspected sleep apnea    Obesity with sleep apnea    Mild persistent asthma    Pulmonary  emphysema    Multiple pulmonary nodules    Encounter for Medicare annual wellness exam    Advance directive discussed with patient    History of prostate cancer    Chronic pain of left knee    Chronic right shoulder pain   [3]   Social History  Tobacco Use    Smoking status: Never    Smokeless tobacco: Never   Substance Use Topics    Alcohol use: Not Currently     Alcohol/week: 3.3 - 4.2 standard drinks of alcohol     Types: 4 - 5 Standard drinks or equivalent per week     Comment: No alcohol x 2 weeks    Drug use: No   [4]   Current Outpatient Medications:     acetaminophen (TYLENOL) 500 MG tablet, Take 1 tablet (500 mg total) by mouth every 4 (four) hours as needed for Pain., Disp: 20 tablet, Rfl: 0    albuterol (PROVENTIL/VENTOLIN HFA) 90 mcg/actuation inhaler, Inhale 1-2 puffs into the lungs every 4 (four) hours as needed for Wheezing., Disp: 18 g, Rfl: 11    albuterol-budesonide (AIRSUPRA) 90-80 mcg/actuation, Inhale 2 puffs into the lungs every 4 (four) hours as needed (for shortness of breath and wheezing)., Disp: 10.7 g, Rfl: 11    amitriptyline (ELAVIL) 50 MG tablet, Take 25 mg by mouth every evening., Disp: , Rfl:     azelastine (ASTELIN) 137 mcg (0.1 %) nasal spray, 1 spray (137 mcg total) by Nasal route 2 (two) times daily., Disp: 30 mL, Rfl: 3    diclofenac (CATAFLAM) 50 MG tablet, Take 50 mg by mouth 2 (two) times daily as needed., Disp: , Rfl:     fexofenadine (ALLEGRA) 180 MG tablet, Take 1 tablet (180 mg total) by mouth once daily., Disp: 90 tablet, Rfl: 3    fluconazole (DIFLUCAN) 150 MG Tab, Take 1 tablet (150 mg total) by mouth once a week. for 6 doses, Disp: 6 tablet, Rfl: 0    FLUoxetine 40 MG capsule, Take 1 capsule (40 mg total) by mouth once daily., Disp: 90 capsule, Rfl: 3    fluticasone propionate (FLONASE) 50 mcg/actuation nasal spray, 1 spray (50 mcg total) by Each Nostril route 2 (two) times daily as needed for Rhinitis., Disp: 16 g, Rfl: 11    fluticasone-salmeterol diskus inhaler  250-50 mcg, Inhale 1 puff into the lungs 2 (two) times daily., Disp: , Rfl:     hydroCHLOROthiazide (HYDRODIURIL) 25 MG tablet, Take 1 tablet by mouth once daily, Disp: 90 tablet, Rfl: 1    latanoprost 0.005 % ophthalmic solution, , Disp: , Rfl:     leuprolide acetate, 6 month, (ELIGARD) 45 mg injection, Inject 45 mg into the skin every 6 (six) months., Disp: , Rfl:     losartan (COZAAR) 50 MG tablet, Take 1 tablet by mouth once daily, Disp: 90 tablet, Rfl: 3    montelukast (SINGULAIR) 10 mg tablet, Take 1 tablet (10 mg total) by mouth every evening., Disp: 90 tablet, Rfl: 3    mv-min/folic/K1/lycopen/lutein (CENTRUM MEN 50 PLUS MINIS ORAL), Take by mouth., Disp: , Rfl:     rosuvastatin (CRESTOR) 20 MG tablet, Take 1 tablet (20 mg total) by mouth once daily., Disp: 90 tablet, Rfl: 3

## 2025-07-22 NOTE — PATIENT INSTRUCTIONS
Thank you for seeing me today.    Please call the Referral Desk to schedule Orthopedics Appointment at your earliest convenience:  [phone]    SAMPLE MEAL PLAN (1500 calories per day)  The following is designed to combine a strict daytime diet + evening flexibility to support long-term dietary adherence.    This meal plan is JUST a suggestion, but should lead to weight loss at approximately 1 lb per week.     Breakfast = protein shake + ground flax seed    (30 grams protein, 4 grams fiber, 230 calories)    Lunch = egg whites + lean meat/fish + avocado or greens/veggies   (45 grams protein, 10 grams fiber, 450-600 calories)    Snack = 1/2 cup mixed nuts   (15 grams protein, 6 grams fiber, 300 calories)       Subtotal:   90 grams protein, 20 grams fiber, 1100 calories       Dinner & Dessert = up to you ... budget of about 400 calories    ADDITIONAL SUGGESTIONS    -- Alternative Sweeteners - low calories, safe in DM:  stevia, erythritol, truvia, monk fruit    -- No sugary drinks - This includes soda, fruit juice    -- Carbs - limited ... maximum of 1 [id number] portion of rice/bread/pasta/grits    -- Multivitamin - while decreasing total food intake, adding a generic multivitamin is a good idea      Weight Loss - Mechanics & Recommendations  -- Benefits include lower blood pressure, lower cholesterol, improved glycemic control, increased mobility, reduced strain on joints & lower risk of physical (mechanical) injury.      Calorie Restriction:   -- About:  Calorie restriction (a negative calorie balance) is the only way to lose weight (short of surgical liposuction)... Stimulants, injectables & even gastric bypass surgery all simply suppress appetite and/or make it easier to eat less - however, even in these cases, if a negative calorie balance is not achieved, weight loss does not occur.  -- Goal:  Aim for a daily deficit of 500 calories to lose about one pound per week (takes about 3500 calories to lose 1  "lb).  For example, if the average adult requires 2,000 calories per day, reducing intake to 7369-2881 calories per day should result in about 1lb of weight loss per week.  Ensure that your daily intake does not drop below 1,200 calories per day in order to avoid nutrient deficiency  -- Also:  The "[employer]" terrence may be very helpful in counting calories to reach the targets described above.     Protein Intake:  -- About:  While in a calorie deficit, protein intake (with even modest physical activity) will help to protect lean muscle mass, ensuring that your body burns only fat to make up for your target calorie deficit (see above)  -- Goal:  Aim for approximately 130g of protein per day can help preserve lean muscle mass during calorie restriction  -- Also:  Great sources of protein:  chicken breast, lean meats/fish, egg whites, unsweetened Greek Yoghurt, whey protein (powder is much more affordable than pre-mixed protein drinks)    ... You may also consider ...          Fiber:  --  Fiber intake increases satiety (especially when taken with or right before meals).  It also lowers cholesterol, improves glucose homeostasis, promotes regularity and is somewhat protective against colorectal cancer.  --   Sources of Fiber:       ** Supplemental fiber should be accompanied by ample hydration (about 2.5-3 liters of water per day at least) **      -   Vegetables are generally high in fiber and low in calories (root vegetables generally being the exception)    -   Avocados are incredibly high in both fiber & healthy fat     -   Ground Flax Seed:  Flax is a gentle (modest) source of fiber which is Not likely to cause bloating.  Try 1 tbsp of ground flax seed with a shake/smoothie or added to oatmeal.  Nutty flavor, affordable, well-tolerated.     -   Psyllium Husk (Metamucil):  Excellent source of fiber, but start slowly!  Start with about 1 tsp per day taken with a glass (8oz) of water, and slowly increase to 1-3 tbsp per " day before or with meals as tolerated.  --  An APPLE before meals will make you feel graff, which should result in fewer calories per meal        Limit Carbohydrates:  -- Choose higher-fiber, lower-carbohydrate meals to control hunger and appetite. Opt for carbs high in fiber and low in added sugar, such as beans and sweet potatoes, and avoid sugary drinks and chips    Increase Physical Activity:  -- Incorporate physical activity to enhance insulin sensitivity and slightly assist with achieving a calorie deficit.  Aim for strength-training activities at least two days per week, with 2-3 days of moderate to vigorous cardiovascular training (brisk walking, cycling, swimming) if possible.      Please don't hesitate to seek emergency care if you develop any new or worsening symptoms.

## 2025-07-22 NOTE — ASSESSMENT & PLAN NOTE
Wt Readings from Last 4 Encounters:   07/22/25 117.4 kg (258 lb 13.1 oz)   06/24/25 116.9 kg (257 lb 11.5 oz)   05/22/25 116.1 kg (255 lb 15.3 oz)   04/22/25 115.6 kg (254 lb 13.6 oz)   -- Discussed the role of weight loss in cardiovascular, metabolic and orthopedic risk reduction  -- Discussed the mechanics of weight loss in detail.  Patient expresses understanding and will attempt to make the changes discussed today.  Recommendations reiterated in AVS.

## 2025-07-22 NOTE — ASSESSMENT & PLAN NOTE
Patient has chronic shoulder, knee pain [facility].  Also notes multiple shoulder surgeries in the past and was told that he needs MRI and possible revision.  -- ambulatory here in clinic, moving all 4 extremities without severe pain  -- referral placed to Orthopedics as requested for further evaluation

## 2025-07-22 NOTE — PROGRESS NOTES
Assessment & Plan     Chronic pain of left knee  Chronic right shoulder pain  Patient has chronic shoulder, knee pain for which [facility].  Also notes multiple shoulder surgeries in the past and was told that he needs MRI and possible revision.  -- ambulatory here in clinic, moving all 4 extremities without severe pain  -- referral placed to Orthopedics as requested for further evaluation  Orders:  -     Ambulatory referral/consult to Orthopedics; Future; Expected date: 07/29/2025    Class 3 severe obesity due to excess calories with serious comorbidity and body mass index (BMI) of 40.0 to 44.9 in adult  Wt Readings from Last 4 Encounters:   07/22/25 117.4 kg (258 lb 13.1 oz)   06/24/25 116.9 kg (257 lb 11.5 oz)   05/22/25 116.1 kg (255 lb 15.3 oz)   04/22/25 115.6 kg (254 lb 13.6 oz)   -- Discussed the role of weight loss in cardiovascular, metabolic and orthopedic risk reduction  -- Discussed the mechanics of weight loss in detail.  Patient expresses understanding and will attempt to make the changes discussed today.  Recommendations reiterated in AVS.    Hypertension  Blood pressure is very well-controlled on losartan 50, hydrochlorothiazide 25.  Pressure today is 110/62.  We will continue to monitor, no changes today       HPI   Randolph Blue is a 60 y.o. male with multiple medical diagnoses as listed in the medical history and problem list who presents for various acute & chronic conditions as detailed above.    ROS:  Patient denies any CP, SOB, abdominal pain, fever, chills, N/V, hematuria, hematochezia, melena    Follow Up with PCP as scheduled       Health Maintenance         Date Due Completion Date    RSV Vaccine (Age 60+ and Pregnant patients) (1 - Risk 60-74 years 1-dose series) Never done ---    Shingles Vaccine (2 of 2) 07/31/2025 6/5/2025    Influenza Vaccine (1) 09/01/2025 10/2/2024    Hemoglobin A1c (Prediabetes) 06/16/2026 6/16/2025    Colorectal Cancer Screening  04/04/2029 4/4/2024    Lipid Panel 06/16/2030 6/16/2025    TETANUS VACCINE 06/05/2035 6/5/2025               Physical Exam     Vital signs reviewed  Body mass index is 40.54 kg/m².  General:  Well-developed, well-nourished. NAD  Skin:  Warm, dry  Head:  NC/AT   Eyes:  Conjunctivae w/o exudates or hemorrhage.  Non-icteric sclerae.  Neck:  Trachea is midline.    Lungs:  CTAB without rales, rhonchi or wheezing.   Breathing comfortably on RA.    Heart:  Normal S1 & S2.  No extra heart sounds.   No pulsus alternans.  Abdomen:  Symmetric, non-distended  Neuro:  A&O4.  No obvious focal deficits  Extremities:  Radial pulses 2+ and symmetric  Psychiatric:  Appropriate affect    Additional pertinent exam findings included above in A/P       History     Past Medical History:  Past Medical History:   Diagnosis Date    Arthritis     Bronchitis     Cervical arthritis     SILVIA (generalized anxiety disorder) 01/03/2014    GERD (gastroesophageal reflux disease)     Glaucoma     Hyperlipidemia, mixed 04/03/2014    Hypertension        Past Surgical History:  Past Surgical History:   Procedure Laterality Date    COLONOSCOPY N/A 4/4/2024    Procedure: COLONOSCOPY;  Surgeon: Tj Clarke MD;  Location: [facility];  Service: Endoscopy;  Laterality: N/A;  Referred by: Dr. Cee Solo  Prep: PEG  Route instructions sent: portal  Other concerns: Rectal bleeding, pt just d/c'd from [facility]  SW    KNEE SURGERY      bilateral    MANDIBLE FRACTURE SURGERY      PROSTATE SURGERY      SHOULDER SURGERY      [facility].        Social History:  Social History[1]    Family History:  Family History   Problem Relation Name Age of Onset    Cancer Mother      Hypertension Mother      Heart disease Father      Hypertension Maternal Uncle      Hypertension Paternal Uncle      Hypertension Maternal Grandmother      Heart disease Paternal Grandmother      Hypertension Paternal Grandmother         Allergies and Medications: (updated and  reviewed)  Review of patient's allergies indicates:   Allergen Reactions    Percocet [oxycodone-acetaminophen]      Other reaction(s): Rash     Current Medications[2]    Patient Care Team:  Cee Solo DO as PCP - General (Family Medicine)  Deondre Acosta MD as Consulting Physician (Orthopedic Surgery)  Tory La LPN as Care Coordinator        Sohail Cobb PA-C  [facility]  [facility]         - The patient is given an After Visit Summary that lists all medications with directions, allergies, education, orders placed during this encounter and follow-up instructions.      - I have reviewed the patient's medical information including past medical, family, and social history sections including the medications and allergies.      - We discussed the patient's current medications.     This note was created by combination of typed  and MModal dictation.  Transcription errors may be present.  If there are any questions, please contact me.                     [1]   Social History  Socioeconomic History    Marital status: Single   Tobacco Use    Smoking status: Never    Smokeless tobacco: Never   Substance and Sexual Activity    Alcohol use: Not Currently     Alcohol/week: 3.3 - 4.2 standard drinks of alcohol     Types: 4 - 5 Standard drinks or equivalent per week     Comment: No alcohol x 2 weeks    Drug use: No    Sexual activity: Not Currently     Social Drivers of Health     Financial Resource Strain: Low Risk  (7/8/2025)    Received from [facility]    Overall Financial Resource Strain (CARDIA)     How hard is it for you to pay for the very basics like food, housing, medical care, and heating?: Not hard at all   Food Insecurity: No Food Insecurity (7/8/2025)    Received from [facility]    Hunger Vital Sign     Worried About Running Out of Food in the Last Year: Never true     Ran Out of Food in the Last Year: Never true   Transportation Needs: No  Transportation Needs (7/8/2025)    Received from [facility]    PRAPARE - Transportation     In the past 12 months, has lack of transportation kept you from medical appointments or from getting medications?: No     In the past 12 months, has lack of transportation kept you from meetings, work, or from getting things needed for daily living?: No   Physical Activity: Inactive (7/8/2025)    Received from [facility]    Exercise Vital Sign     Days of Exercise per Week: Patient declined     Minutes of Exercise per Session: 0 min   Stress: No Stress Concern Present (7/8/2025)    Received from [facility]    [employer] [place] [employer] - Occupational Stress Questionnaire     Do you feel stress - tense, restless, nervous, or anxious, or unable to sleep at night because your mind is troubled all the time - these days?: Not at all   Housing Stability: Unknown (7/8/2025)    Received from [facility]    Housing Stability Vital Sign     Unable to Pay for Housing in the Last Year: No   [2]   Current Outpatient Medications   Medication Sig Dispense Refill    acetaminophen (TYLENOL) 500 MG tablet Take 1 tablet (500 mg total) by mouth every 4 (four) hours as needed for Pain. 20 tablet 0    albuterol (PROVENTIL/VENTOLIN HFA) 90 mcg/actuation inhaler Inhale 1-2 puffs into the lungs every 4 (four) hours as needed for Wheezing. 18 g 11    albuterol-budesonide (AIRSUPRA) 90-80 mcg/actuation Inhale 2 puffs into the lungs every 4 (four) hours as needed (for shortness of breath and wheezing). 10.7 g 11    amitriptyline (ELAVIL) 50 MG tablet Take 25 mg by mouth every evening.      azelastine (ASTELIN) 137 mcg (0.1 %) nasal spray 1 spray (137 mcg total) by Nasal route 2 (two) times daily. 30 mL 3    diclofenac (CATAFLAM) 50 MG tablet Take 50 mg by mouth 2 (two) times daily as needed.      fexofenadine (ALLEGRA) 180 MG tablet Take 1 tablet (180 mg total) by mouth once daily. 90 tablet 3    fluconazole (DIFLUCAN) 150 MG Tab Take 1  tablet (150 mg total) by mouth once a week. for 6 doses 6 tablet 0    fluticasone propionate (FLONASE) 50 mcg/actuation nasal spray 1 spray (50 mcg total) by Each Nostril route 2 (two) times daily as needed for Rhinitis. 16 g 11    fluticasone-salmeterol diskus inhaler 250-50 mcg Inhale 1 puff into the lungs 2 (two) times daily.      hydroCHLOROthiazide (HYDRODIURIL) 25 MG tablet Take 1 tablet by mouth once daily 90 tablet 1    latanoprost 0.005 % ophthalmic solution       leuprolide acetate, 6 month, (ELIGARD) 45 mg injection Inject 45 mg into the skin every 6 (six) months.      losartan (COZAAR) 50 MG tablet Take 1 tablet by mouth once daily 90 tablet 3    montelukast (SINGULAIR) 10 mg tablet Take 1 tablet (10 mg total) by mouth every evening. 90 tablet 3    mv-min/folic/K1/lycopen/lutein (CENTRUM MEN 50 PLUS MINIS ORAL) Take by mouth.      rosuvastatin (CRESTOR) 20 MG tablet Take 1 tablet (20 mg total) by mouth once daily. 90 tablet 3    FLUoxetine 40 MG capsule Take 1 capsule (40 mg total) by mouth once daily. 90 capsule 3     No current facility-administered medications for this visit.

## 2025-07-28 ENCOUNTER — APPOINTMENT (OUTPATIENT)
Dept: RADIOLOGY | Facility: HOSPITAL | Age: 60
End: 2025-07-28
Attending: ORTHOPAEDIC SURGERY
Payer: MEDICARE

## 2025-07-28 ENCOUNTER — OFFICE VISIT (OUTPATIENT)
Dept: ORTHOPEDICS | Facility: CLINIC | Age: 60
End: 2025-07-28
Payer: MEDICARE

## 2025-07-28 DIAGNOSIS — G89.29 CHRONIC RIGHT SHOULDER PAIN: ICD-10-CM

## 2025-07-28 DIAGNOSIS — G89.29 CHRONIC PAIN OF LEFT KNEE: ICD-10-CM

## 2025-07-28 DIAGNOSIS — M25.562 ACUTE PAIN OF LEFT KNEE: Primary | ICD-10-CM

## 2025-07-28 DIAGNOSIS — M25.562 CHRONIC PAIN OF LEFT KNEE: ICD-10-CM

## 2025-07-28 DIAGNOSIS — M25.511 RIGHT SHOULDER PAIN, UNSPECIFIED CHRONICITY: ICD-10-CM

## 2025-07-28 DIAGNOSIS — M25.511 CHRONIC RIGHT SHOULDER PAIN: ICD-10-CM

## 2025-07-28 DIAGNOSIS — M25.562 LEFT KNEE PAIN, UNSPECIFIED CHRONICITY: ICD-10-CM

## 2025-07-28 PROCEDURE — 73564 X-RAY EXAM KNEE 4 OR MORE: CPT | Mod: TC,PN,LT

## 2025-07-28 PROCEDURE — 73564 X-RAY EXAM KNEE 4 OR MORE: CPT | Mod: 26,LT,, | Performed by: RADIOLOGY

## 2025-07-28 PROCEDURE — 4010F ACE/ARB THERAPY RXD/TAKEN: CPT | Mod: CPTII,S$GLB,, | Performed by: ORTHOPAEDIC SURGERY

## 2025-07-28 PROCEDURE — 99999 PR PBB SHADOW E&M-EST. PATIENT-LVL III: CPT | Mod: PBBFAC,,, | Performed by: ORTHOPAEDIC SURGERY

## 2025-07-28 PROCEDURE — 1160F RVW MEDS BY RX/DR IN RCRD: CPT | Mod: CPTII,S$GLB,, | Performed by: ORTHOPAEDIC SURGERY

## 2025-07-28 PROCEDURE — 1159F MED LIST DOCD IN RCRD: CPT | Mod: CPTII,S$GLB,, | Performed by: ORTHOPAEDIC SURGERY

## 2025-07-28 PROCEDURE — 3044F HG A1C LEVEL LT 7.0%: CPT | Mod: CPTII,S$GLB,, | Performed by: ORTHOPAEDIC SURGERY

## 2025-07-28 PROCEDURE — 99204 OFFICE O/P NEW MOD 45 MIN: CPT | Mod: S$GLB,,, | Performed by: ORTHOPAEDIC SURGERY

## 2025-08-02 DIAGNOSIS — I10 ESSENTIAL HYPERTENSION: ICD-10-CM

## 2025-08-02 NOTE — TELEPHONE ENCOUNTER
No care due was identified.  [facility] Embedded Care Due Messages. Reference number: [id number].   8/02/2025 2:40:04 PM CDT

## 2025-08-03 DIAGNOSIS — I10 ESSENTIAL HYPERTENSION: ICD-10-CM

## 2025-08-03 NOTE — TELEPHONE ENCOUNTER
No care due was identified.  [facility] Embedded Care Due Messages. Reference number: [id number].   8/03/2025 7:58:14 AM CDT

## 2025-08-04 RX ORDER — HYDROCHLOROTHIAZIDE 25 MG/1
25 TABLET ORAL DAILY
Qty: 90 TABLET | Refills: 3 | Status: SHIPPED | OUTPATIENT
Start: 2025-08-04

## 2025-08-04 RX ORDER — HYDROCHLOROTHIAZIDE 25 MG/1
25 TABLET ORAL
Qty: 90 TABLET | Refills: 3 | Status: SHIPPED | OUTPATIENT
Start: 2025-08-04 | End: 2025-08-04 | Stop reason: SDUPTHER

## 2025-08-04 NOTE — TELEPHONE ENCOUNTER
Refill Decision Note   Randolph Blue  is requesting a refill authorization.  Brief Assessment and Rationale for Refill:  Approve     Medication Therapy Plan:         Comments:     Note composed:10:55 AM 08/04/2025

## 2025-08-11 ENCOUNTER — OFFICE VISIT (OUTPATIENT)
Dept: ORTHOPEDICS | Facility: CLINIC | Age: 60
End: 2025-08-11
Payer: MEDICARE

## 2025-08-11 VITALS — WEIGHT: 258.81 LBS | HEIGHT: 67 IN | BODY MASS INDEX: 40.62 KG/M2

## 2025-08-11 DIAGNOSIS — M17.12 OSTEOARTHRITIS OF LEFT KNEE, UNSPECIFIED OSTEOARTHRITIS TYPE: ICD-10-CM

## 2025-08-11 DIAGNOSIS — M25.562 ACUTE PAIN OF LEFT KNEE: Primary | ICD-10-CM

## 2025-08-11 PROCEDURE — 4010F ACE/ARB THERAPY RXD/TAKEN: CPT | Mod: CPTII,S$GLB,,

## 2025-08-11 PROCEDURE — 1159F MED LIST DOCD IN RCRD: CPT | Mod: CPTII,S$GLB,,

## 2025-08-11 PROCEDURE — 99214 OFFICE O/P EST MOD 30 MIN: CPT | Mod: 25,S$GLB,,

## 2025-08-11 PROCEDURE — 1160F RVW MEDS BY RX/DR IN RCRD: CPT | Mod: CPTII,S$GLB,,

## 2025-08-11 PROCEDURE — 20610 DRAIN/INJ JOINT/BURSA W/O US: CPT | Mod: LT,S$GLB,,

## 2025-08-11 PROCEDURE — 3008F BODY MASS INDEX DOCD: CPT | Mod: CPTII,S$GLB,,

## 2025-08-11 PROCEDURE — 99999 PR PBB SHADOW E&M-EST. PATIENT-LVL III: CPT | Mod: PBBFAC,,,

## 2025-08-11 PROCEDURE — 3044F HG A1C LEVEL LT 7.0%: CPT | Mod: CPTII,S$GLB,,

## 2025-08-11 RX ORDER — TRIAMCINOLONE ACETONIDE 40 MG/ML
40 INJECTION, SUSPENSION INTRA-ARTICULAR; INTRAMUSCULAR
Status: DISCONTINUED | OUTPATIENT
Start: 2025-08-11 | End: 2025-08-11 | Stop reason: HOSPADM

## 2025-08-11 RX ORDER — DICLOFENAC SODIUM 50 MG/1
50 TABLET, DELAYED RELEASE ORAL 2 TIMES DAILY
Qty: 60 TABLET | Refills: 0 | Status: SHIPPED | OUTPATIENT
Start: 2025-08-11

## 2025-08-11 RX ADMIN — TRIAMCINOLONE ACETONIDE 40 MG: 40 INJECTION, SUSPENSION INTRA-ARTICULAR; INTRAMUSCULAR at 03:08

## 2025-09-03 ENCOUNTER — OFFICE VISIT (OUTPATIENT)
Dept: FAMILY MEDICINE | Facility: CLINIC | Age: 60
End: 2025-09-03
Payer: MEDICARE

## 2025-09-03 VITALS
RESPIRATION RATE: 18 BRPM | WEIGHT: 257.94 LBS | TEMPERATURE: 99 F | SYSTOLIC BLOOD PRESSURE: 130 MMHG | OXYGEN SATURATION: 97 % | BODY MASS INDEX: 40.4 KG/M2 | DIASTOLIC BLOOD PRESSURE: 80 MMHG | HEART RATE: 74 BPM

## 2025-09-03 DIAGNOSIS — E66.813 CLASS 3 SEVERE OBESITY DUE TO EXCESS CALORIES WITH SERIOUS COMORBIDITY AND BODY MASS INDEX (BMI) OF 40.0 TO 44.9 IN ADULT: ICD-10-CM

## 2025-09-03 DIAGNOSIS — R91.8 MULTIPLE PULMONARY NODULES: ICD-10-CM

## 2025-09-03 DIAGNOSIS — Z85.46 HISTORY OF PROSTATE CANCER: Primary | ICD-10-CM

## 2025-09-03 PROBLEM — Z71.89 ADVANCE DIRECTIVE DISCUSSED WITH PATIENT: Status: RESOLVED | Noted: 2025-05-22 | Resolved: 2025-09-03

## 2025-09-03 PROBLEM — Z00.00 ENCOUNTER FOR MEDICARE ANNUAL WELLNESS EXAM: Status: RESOLVED | Noted: 2025-05-22 | Resolved: 2025-09-03

## 2025-09-03 PROCEDURE — 99999 PR PBB SHADOW E&M-EST. PATIENT-LVL IV: CPT | Mod: PBBFAC,,,
